# Patient Record
Sex: FEMALE | Race: WHITE | Employment: OTHER | URBAN - METROPOLITAN AREA
[De-identification: names, ages, dates, MRNs, and addresses within clinical notes are randomized per-mention and may not be internally consistent; named-entity substitution may affect disease eponyms.]

---

## 2017-01-04 DIAGNOSIS — Z79.899 ENCOUNTER FOR LONG-TERM CURRENT USE OF MEDICATION: ICD-10-CM

## 2017-01-04 DIAGNOSIS — Z94.0 KIDNEY REPLACED BY TRANSPLANT: ICD-10-CM

## 2017-01-04 DIAGNOSIS — Z48.298 AFTERCARE FOLLOWING ORGAN TRANSPLANT: ICD-10-CM

## 2017-01-04 PROCEDURE — 80158 DRUG ASSAY CYCLOSPORINE: CPT | Performed by: INTERNAL MEDICINE

## 2017-01-04 NOTE — Clinical Note
OUTPATIENT LABORATORY TEST ORDER      Patient Name: Meliza Krishnamurthy                   Transplant Date: January 6, 1998  YOB: 1935                       Issue Date & Time: 1/4/2017  7:23 AM  [facility] MR: [id number]                             Exp. Date (1 year after date issued)      Diagnoses: Aftercare following organ transplant (ICD-10 Z48.288)   Kidney Transplant (ICD-10 Z94.0)   Long term use of medications (ICD-10 Z79.899)      Lab results to be available on the same day drawn.   Patient should release information to the [facility], Transplant Center.  Please fax to the Transplant Center at [phone].      Every 3 Month(s)  ?CBC and Platelet  ?Basic Metabolic Panel (Sodium, Potassium, Chloride, CO2, Creatinine, Urea Nitrogen, Glucose, Calcium)  ?Cyclosporine/Neoral/Gengraf drug level              Every 6 Months       Due:  and                                      ?Urine for protein/creatinine       If you have any questions, please call The Transplant Center at [phone] or [phone].    Please fax labs to [phone]  .

## 2017-01-06 LAB
CYCLOSPORINE BLD LC/MS/MS-MCNC: 45 UG/L (ref 50–400)
TME LAST DOSE: ABNORMAL H

## 2017-01-08 ENCOUNTER — TELEPHONE (OUTPATIENT)
Dept: TRANSPLANT | Facility: CLINIC | Age: 82
End: 2017-01-08

## 2017-03-23 ENCOUNTER — TELEPHONE (OUTPATIENT)
Dept: TRANSPLANT | Facility: CLINIC | Age: 82
End: 2017-03-23

## 2017-03-23 NOTE — TELEPHONE ENCOUNTER
Prior Authorization Specialty Medication Request    Medication/Dose: Mycophenolate Mofetil 250 mg, Take 2 capsules by mouth (500 mg) every morning and 1 capsules (250 mg) every evening  Diagnosis and ICD: Kidney Transplant Z94.0  New/Renewal/Insurance Change PA: renewal    Important Lab Values:     Previously Tried and Failed Therapies:     Rationale:     Would you like to include any research articles?    If yes please include the hyperlink(s) below or fax @ [phone].    (Include Name and MRN)    If you received a fax notification from an outside Pharmacy;  Pharmacy Name:[facility]  Pharmacy #:[phone]  Pharmacy Fax:[phone]

## 2017-03-28 NOTE — TELEPHONE ENCOUNTER
[facility] called, having questions about med to determine Medicare B or D coverage. Please advise at [phone].

## 2017-04-03 ENCOUNTER — TELEPHONE (OUTPATIENT)
Dept: TRANSPLANT | Facility: CLINIC | Age: 82
End: 2017-04-03

## 2017-04-03 NOTE — TELEPHONE ENCOUNTER
Call placed to PA dept: Spoke with Anthony she states that an encounter for patient MMF has been started and she will open an encounter for patient Cyclosporine.

## 2017-04-03 NOTE — TELEPHONE ENCOUNTER
[4/3/2017 4:07 PM] Alexandra Barba:   I have Rubi from [facility] regarding a PA that was fax to us and they called on 3/28/2017. Patient is completely out of her meds Meliza Krishnamurthy [id number]. Can you speak with her      Per Rubi from [facility] they have faxed the PA paperwork twice to the Doctor's office.  Made aware they can fax it to the transplant center fax number, fax number provided.  Patient has ran out of medication. Message left for Noemi CABRAL LPN task:  Please call PRIOR AUTH Dept ([phone]) re: Prior auth for both Cyclosporine and mycophenolate.  Insurance Contact number is [phone].

## 2017-04-04 NOTE — TELEPHONE ENCOUNTER
PA Initiation    Medication: mycophenolate  Insurance Company: Medicare [employer] - Phone [phone] Fax [phone]  Pharmacy Filling the Rx:    Filling Pharmacy Phone:    Filling Pharmacy Fax:    Start Date: 4/4/2017    [facility] Authorization Team   Phone: [phone]  Fax: [phone]

## 2017-04-05 NOTE — TELEPHONE ENCOUNTER
Prior Authorization Specialty Medication Request    Medication/Dose: mycophenolate  Diagnosis and ICD: Kidney Transplant Z94.0  New/Renewal/Insurance Change PA:  Cover my med key [id number]    Important Lab Values:     Previously Tried and Failed Therapies:     Rationale:     Would you like to include any research articles?    If yes please include the hyperlink(s) below or fax @ [phone].    (Include Name and MRN)    If you received a fax notification from an outside Pharmacy;  Pharmacy Name: [facility]  Pharmacy #: [phone]  Pharmacy Fax: [phone]

## 2017-04-10 DIAGNOSIS — Z94.0 KIDNEY REPLACED BY TRANSPLANT: ICD-10-CM

## 2017-04-10 DIAGNOSIS — Z48.298 AFTERCARE FOLLOWING ORGAN TRANSPLANT: ICD-10-CM

## 2017-04-10 DIAGNOSIS — Z79.899 ENCOUNTER FOR LONG-TERM CURRENT USE OF MEDICATION: ICD-10-CM

## 2017-04-10 PROCEDURE — 80158 DRUG ASSAY CYCLOSPORINE: CPT | Performed by: INTERNAL MEDICINE

## 2017-04-11 LAB
CYCLOSPORINE BLD LC/MS/MS-MCNC: 84 UG/L (ref 50–400)
TME LAST DOSE: NORMAL H

## 2017-04-11 NOTE — TELEPHONE ENCOUNTER
Prior Authorization Approval    Authorization Effective Date: 4/7/2017  Authorization Expiration Date: 4/11/2018  Medication: mycophenolate  Approved Dose/Quantity: 90  Reference #: [id number]   Insurance Company: Medicare [employer] RX - Phone [phone] Fax [phone]  Expected CoPay:       CoPay Card Available:      Foundation Assistance Needed:    Which Pharmacy is filling the prescription (Not needed for infusion/clinic administered):    Pharmacy Notified:    Patient Notified:      MEGAN Colmenares Prior Authorization Team   Phone: [phone]  Fax: [phone]

## 2017-04-11 NOTE — TELEPHONE ENCOUNTER
Prior Authorization Approval    Authorization Effective Date: 4/7/2017  Authorization Expiration Date: 4/11/2018  Medication: mycophenolate  Approved Dose/Quantity: 90  Reference #: [id number]   Insurance Company: [employer] [place] - Phone [phone] Fax [phone]  Expected CoPay:       CoPay Card Available:      Foundation Assistance Needed:    Which Pharmacy is filling the prescription (Not needed for infusion/clinic administered):    Pharmacy Notified:    Patient Notified:

## 2017-05-02 DIAGNOSIS — Z94.0 KIDNEY TRANSPLANT STATUS: Primary | ICD-10-CM

## 2017-05-02 NOTE — LETTER
OUTPATIENT LABORATORY TEST ORDER      Patient Name: Meliza Krishnamurthy                   Transplant Date: January 6, 1998  YOB: 1935                       Issue Date & Time: 1/4/2017  7:23 AM  [facility] MR: [id number]                             Exp. Date (1 year after date issued)      Diagnoses: Aftercare following organ transplant (ICD-10 Z48.288)   Kidney Transplant (ICD-10 Z94.0)   Long term use of medications (ICD-10 Z79.899)      Lab results to be available on the same day drawn.   Patient should release information to the [facility], Transplant Center.  Please fax to the Transplant Center at [phone].      Every 3 Month(s)  ?CBC and Platelet  ?Basic Metabolic Panel (Sodium, Potassium, Chloride, CO2, Creatinine, Urea Nitrogen, Glucose, Calcium)  ?Cyclosporine/Neoral/Gengraf drug level              Every 6 Months       Due:  and                                      ?Urine for protein/creatinine       If you have any questions, please call The Transplant Center at [phone] or [phone].    Please fax labs to [phone]  .

## 2017-05-18 DIAGNOSIS — Z94.0 DECEASED-DONOR KIDNEY TRANSPLANT RECIPIENT: Primary | ICD-10-CM

## 2017-05-18 RX ORDER — MYCOPHENOLATE MOFETIL 250 MG/1
CAPSULE ORAL
Qty: 90 CAPSULE | Refills: 11 | Status: SHIPPED | OUTPATIENT
Start: 2017-05-18 | End: 2018-04-30

## 2017-07-12 DIAGNOSIS — Z48.298 AFTERCARE FOLLOWING ORGAN TRANSPLANT: ICD-10-CM

## 2017-07-12 DIAGNOSIS — Z79.899 ENCOUNTER FOR LONG-TERM CURRENT USE OF MEDICATION: ICD-10-CM

## 2017-07-12 DIAGNOSIS — Z94.0 KIDNEY REPLACED BY TRANSPLANT: ICD-10-CM

## 2017-07-12 PROCEDURE — 80158 DRUG ASSAY CYCLOSPORINE: CPT | Performed by: INTERNAL MEDICINE

## 2017-07-13 LAB
CYCLOSPORINE BLD LC/MS/MS-MCNC: 72 UG/L (ref 50–400)
TME LAST DOSE: NORMAL H

## 2017-10-25 PROCEDURE — 80158 DRUG ASSAY CYCLOSPORINE: CPT | Performed by: INTERNAL MEDICINE

## 2017-10-26 LAB
CYCLOSPORINE BLD LC/MS/MS-MCNC: 68 UG/L (ref 50–400)
TME LAST DOSE: NORMAL H

## 2017-10-27 ENCOUNTER — OFFICE VISIT (OUTPATIENT)
Dept: NEPHROLOGY | Facility: CLINIC | Age: 82
End: 2017-10-27
Attending: INTERNAL MEDICINE
Payer: MEDICARE

## 2017-10-27 VITALS
DIASTOLIC BLOOD PRESSURE: 76 MMHG | HEIGHT: 64 IN | OXYGEN SATURATION: 99 % | SYSTOLIC BLOOD PRESSURE: 149 MMHG | HEART RATE: 61 BPM | WEIGHT: 110.2 LBS | BODY MASS INDEX: 18.82 KG/M2

## 2017-10-27 DIAGNOSIS — Z94.0 DECEASED-DONOR KIDNEY TRANSPLANT: Primary | ICD-10-CM

## 2017-10-27 DIAGNOSIS — Q61.3 POLYCYSTIC KIDNEY DISEASE: ICD-10-CM

## 2017-10-27 DIAGNOSIS — I12.9 RENAL HYPERTENSION: Primary | ICD-10-CM

## 2017-10-27 DIAGNOSIS — I12.9 RENAL HYPERTENSION: ICD-10-CM

## 2017-10-27 DIAGNOSIS — Z94.0 DECEASED-DONOR KIDNEY TRANSPLANT: ICD-10-CM

## 2017-10-27 PROCEDURE — 99213 OFFICE O/P EST LOW 20 MIN: CPT | Mod: ZF

## 2017-10-27 ASSESSMENT — PAIN SCALES - GENERAL: PAINLEVEL: NO PAIN (0)

## 2017-10-27 NOTE — MR AVS SNAPSHOT
After Visit Summary   10/27/2017    Meliza Krishnamurthy    MRN: [id number]           Patient Information     Date Of Birth          1935        Visit Information        Provider Department      10/27/2017 4:00 PM Noemi Renee MD [facility] Neph[facility]        Today's Diagnoses     -donor kidney transplant    -  1    Polycystic kidney disease        Renal hypertension          Care Instructions    1.  Check BP at home and call if over 150/80  2.  Keep eating!          Follow-ups after your visit        Follow-up notes from your care team     Return in about 11 months (around 2018).      Your next 10 appointments already scheduled     Sep 25, 2018  2:45 PM CDT   (Arrive by 2:15 PM)   Return Kidney Transplant with Noemi Renee MD   [facility]ph[facility] ([facility])    [address] [place] [id number] [phone]              Who to contact     If you have questions or need follow up information about today's clinic visit or your schedule please contact [facility] NE[facility]ROLOG[facility] directly at [phone].  Normal or non-critical lab and imaging results will be communicated to you by MyChart, letter or phone within 4 business days after the clinic has received the results. If you do not hear from us within 7 days, please contact the clinic through [employer] or phone. If you have a critical or abnormal lab result, we will notify you by phone as soon as possible.  Submit refill requests through [employer] or call your pharmacy and they will forward the refill request to us. Please allow 3 business days for your refill to be completed.          Additional Information About Your Visit        MyChart Information     [employer] gives you secure access to your electronic health record. If you see a primary care provider, you can also send messages to your care team and make appointments. If you have questions, please call your primary care clinic.  If you  "do not have a primary care provider, please call [phone] and they will assist you.        Care EveryWhere ID     This is your Care EveryWhere ID. This could be used by other organizations to access your [facility] medical records  [id number]        Your Vitals Were     Pulse Height Pulse Oximetry BMI (Body Mass Index)          61 1.626 m (5' 4\") 99% 18.92 kg/m2         Blood Pressure from Last 3 Encounters:   10/27/17 149/76   10/28/16 152/79   07/06/15 126/71    Weight from Last 3 Encounters:   10/27/17 50 kg (110 lb 3.2 oz)   10/28/16 50.8 kg (112 lb)   07/06/15 51.2 kg (112 lb 12.8 oz)              Today, you had the following     No orders found for display       Primary Care Provider Office Phone # Fax #    Handy Carrillo [phone]       [address]  [place] [address]        Equal Access to Services     EWELINA MORRELL : Hadii aad ku hadasho Soomaali, waaxda luqadaha, qaybta kaalmada adeegyada, waxay christopherin hayjose jn kaushik worthy . So [facility] [phone].    ATENCIÓN: Si habla español, tiene a marina disposición servicios gratuitos de asistencia lingüística. LlThe [facility] [phone].    We comply with applicable federal civil rights laws and [place] laws. We do not discriminate on the basis of race, color, national origin, age, disability, sex, sexual orientation, or gender identity.            Thank you!     Thank you for choosing [facility] NEPHROLOGY  for your care. Our goal is always to provide you with excellent care. Hearing back from our patients is one way we can continue to improve our services. Please take a few minutes to complete the written survey that you may receive in the mail after your visit with us. Thank you!             Your Updated Medication List - Protect others around you: Learn how to safely use, store and throw away your medicines at www.disposemymeds.org.          This list is accurate as of: 10/27/17 11:59 PM.  Always use your most " recent med list.                   Brand Name Dispense Instructions for use Diagnosis    ASPIRIN      81 mg per day        cycloSPORINE modified 25 MG capsule    GENERIC EQUIVALENT    150 capsule    TAKE 3 CAPSULES BY MOUTH EVERY MORNING AND 2 CAPSULES EVERY EVENING    Kidney transplanted       LASIX PO      Take  by mouth. 20mg 1 tab per day        METOPROLOL TARTRATE      25mg. Twice daily.        MULTIVITAL PO      Take  by mouth.        mycophenolate 250 MG capsule    GENERIC EQUIVALENT    90 capsule    Take 2 capsules by mouth (500 mg) every morning and 1 capsules (250 mg) every evening    -donor kidney transplant recipient       PRAVASTATIN SODIUM PO      Take 10 mg by mouth daily        predniSONE 5 MG tablet    DELTASONE    30 tablet    Take 1 tablet by mouth daily.    Kidney replaced by transplant       UNABLE TO FIND      Sulfa: 400-800mg 1 tab per day        ZETIA PO      Take  by mouth. 10mg once per day

## 2017-10-27 NOTE — NURSING NOTE
"Chief Complaint   Patient presents with     RECHECK     Kidney transplant follow up       Initial /76  Pulse 61  Ht 1.626 m (5' 4\")  Wt 50 kg (110 lb 3.2 oz)  SpO2 99%  BMI 18.92 kg/m2 Estimated body mass index is 18.92 kg/(m^2) as calculated from the following:    Height as of this encounter: 1.626 m (5' 4\").    Weight as of this encounter: 50 kg (110 lb 3.2 oz).  Medication Reconciliation: complete   Pt states all medication are the same  TAYLOR SOLOMON CMA      "

## 2017-10-27 NOTE — LETTER
10/27/2017      RE: Meliza Krishnamurthy  [address] [place] [address]       REASON FOR VISIT:  Ms. Krishnamurthy is an 81-year-old woman here for followup after a  donor kidney transplant in 1998.      The patient had 1 rejection 9 months post-transplant.  Current immunosuppression is Gengraf 75/50, recent level 68, CellCept 500/250 and prednisone 5.  We considered reducing her immunosuppression but she had a DSA in  (DR7) titer 460 and titer measured in 10/2016 was 7686.      She has episodic skin cancer, is seen every 3-4 months and has things removed.  She had Mohs treatment 2 years ago for a facial lesion.      She has had low weight at 110 pounds (BMI 19), which she attributes to early satiety that comes from enlarged polycystic kidneys.  She does not have GI complaints.      She takes her blood pressure at home.  It is generally 120/80, never higher than 130.      REVIEW OF SYSTEMS:  Comprehensive review of systems completed and negative except as in the HPI.      SOCIAL HISTORY:  Her granddaughter is living with their again.  She spends 3 months of the year in [place], [place].  Drinks 5 glasses of alcohol a week and uses a cane after her last hip replacement which has left her with difficulty walking.  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      PAST MEDICAL HISTORY:   1.  Polycystic kidney disease.   2.  Skin cancer, ongoing.   3.  Status post hip replacement x2.   4.  History of cerebral aneurysm repair.   5.   donor kidney transplant in .      PHYSICAL EXAMINATION:   VITAL SIGNS:  Blood pressure 149//74, weight 110, BMI 19, heart rate 61.   GENERAL:  She is well-developed, thin, petite.   SKIN:  Tanned.  She has some changes of chronic immunosuppression, particularly on her hands and shoulders and around the neck.   HEENT:  Bilateral carotid bruits.   LUNGS:  Clear.   CARDIAC:  Regular sinus rhythm, no  murmurs, rubs or gallops.   ABDOMEN:  Not examined.   NEUROLOGIC:  She has difficulty walking and uses a scissors gait and still uses a cane.      LABORATORY DATA:  Creatinine 0.8, do not have the rest labs in our chart yet from 10/25.  CSA level 68.      IMPRESSION:   1.  Allograft function.  Good.   2.  Immunosuppression.  Decided not to drop the CellCept because the DSA was rising in 10/2016.  Will repeat that test this year.   3.  Hypertension, well controlled at home and never quite as good in clinic.  We will use a home blood pressure measurements.   4.  Skin cancer, stable, some ongoing problems.      PLAN:   1.  No change in medications.   2.  The patient encouraged to eat regularly.         MORE VOSS MD             D: 10/27/2017 16:19   T: 10/28/2017 12:27   MT: KEEGAN      Name:     RAUDEL SUAZO   MRN:      [id number]        Account:      [id number]   :      1935           Service Date: 10/27/2017      Document: [id number]

## 2017-10-28 NOTE — PROGRESS NOTES
REASON FOR VISIT:  Ms. Krishnamurthy is an 81-year-old woman here for followup after a  donor kidney transplant in 1998.      The patient had 1 rejection 9 months post-transplant.  Current immunosuppression is Gengraf 75/50, recent level 68, CellCept 500/250 and prednisone 5.  We considered reducing her immunosuppression but she had a DSA in  (DR7) titer 460 and titer measured in 10/2016 was 7686.      She has episodic skin cancer, is seen every 3-4 months and has things removed.  She had Mohs treatment 2 years ago for a facial lesion.      She has had low weight at 110 pounds (BMI 19), which she attributes to early satiety that comes from enlarged polycystic kidneys.  She does not have GI complaints.      She takes her blood pressure at home.  It is generally 120/80, never higher than 130.      REVIEW OF SYSTEMS:  Comprehensive review of systems completed and negative except as in the HPI.      SOCIAL HISTORY:  Her granddaughter is living with their again.  She spends 3 months of the year in [place], [place].  Drinks 5 glasses of alcohol a week and uses a cane after her last hip replacement which has left her with difficulty walking.  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      PAST MEDICAL HISTORY:   1.  Polycystic kidney disease.   2.  Skin cancer, ongoing.   3.  Status post hip replacement x2.   4.  History of cerebral aneurysm repair.   5.   donor kidney transplant in .      PHYSICAL EXAMINATION:   VITAL SIGNS:  Blood pressure 149//74, weight 110, BMI 19, heart rate 61.   GENERAL:  She is well-developed, thin, petite.   SKIN:  Tanned.  She has some changes of chronic immunosuppression, particularly on her hands and shoulders and around the neck.   HEENT:  Bilateral carotid bruits.   LUNGS:  Clear.   CARDIAC:  Regular sinus rhythm, no murmurs, rubs or gallops.   ABDOMEN:  Not examined.   NEUROLOGIC:  She has  difficulty walking and uses a scissors gait and still uses a cane.      LABORATORY DATA:  Creatinine 0.8, do not have the rest labs in our chart yet from 10/25.  CSA level 68.   Electrolytes/Renal -      IMPRESSION:   1.  Allograft function.  Good.   2.  Immunosuppression.  Decided not to drop the CellCept because the DSA was rising in 10/2016.  Will repeat that test this year.   3.  Hypertension, well controlled at home and never quite as good in clinic.  We will use a home blood pressure measurements.   4.  Skin cancer, stable, some ongoing problems.      PLAN:   1.  No change in medications.   2.  The patient encouraged to eat regularly.     Current Outpatient Prescriptions   Medication Sig Dispense Refill     mycophenolate (CELLCEPT - GENERIC EQUIVALENT) 250 MG capsule Take 2 capsules by mouth (500 mg) every morning and 1 capsules (250 mg) every evening 90 capsule 11     PRAVASTATIN SODIUM PO Take 10 mg by mouth daily       cycloSPORINE modified (GENERIC EQUIVALENT) 25 MG capsule TAKE 3 CAPSULES BY MOUTH EVERY MORNING AND 2 CAPSULES EVERY EVENING 150 capsule 11     predniSONE (DELTASONE) 5 MG tablet Take 1 tablet by mouth daily. 30 tablet 11     Furosemide (LASIX PO) Take  by mouth. 20mg 1 tab per day       UNABLE TO FIND Sulfa: 400-800mg 1 tab per day       METOPROLOL TARTRATE 25mg. Twice daily.        Ezetimibe (ZETIA PO) Take  by mouth. 10mg once per day       Multiple Vitamins-Minerals (MULTIVITAL PO) Take  by mouth.       ASPIRIN 81 mg per day             MORE VOSS MD             D: 10/27/2017 16:19   T: 10/28/2017 12:27   MT: KEEGAN      Name:     RAUDEL SUAZO   MRN:      [id number]        Account:      [id number]   :      1935           Service Date: 10/27/2017      Document: [id number]

## 2017-11-06 DIAGNOSIS — Z79.899 ENCOUNTER FOR LONG-TERM CURRENT USE OF MEDICATION: ICD-10-CM

## 2017-11-06 DIAGNOSIS — Z94.0 KIDNEY REPLACED BY TRANSPLANT: Primary | ICD-10-CM

## 2017-11-06 DIAGNOSIS — Z48.298 AFTERCARE FOLLOWING ORGAN TRANSPLANT: ICD-10-CM

## 2018-01-09 ENCOUNTER — TRANSFERRED RECORDS (OUTPATIENT)
Dept: HEALTH INFORMATION MANAGEMENT | Facility: CLINIC | Age: 83
End: 2018-01-09

## 2018-01-18 ENCOUNTER — TELEPHONE (OUTPATIENT)
Dept: TRANSPLANT | Facility: CLINIC | Age: 83
End: 2018-01-18

## 2018-01-18 NOTE — LETTER
OUTPATIENT LABORATORY TEST ORDER    Patient Name: Meliza Krishnamurthy                   Transplant Date: 01-  YOB: 1935                       Issue Date & Time: January 18, 20182:59 PM  [facility] MR: [id number]                      Exp. Date (1 year after date issued)      Diagnoses: Kidney Transplant (ICD-10 Z94.0)   Long term use of medications (ICD-10  Z79.899)     Lab results to be available on the same day drawn.   Patient should release information to the [facility] Trans[facility].  Please fax to the Transplant Center at [phone].      Every 3 Month(s)  ?CBC and Platelet  ?Basic Metabolic Panel (Sodium, Potassium, Chloride, CO2, Creatinine, Urea Nitrogen, Glucose, Calcium)           ?CSA (Cyclosporine) drug level.              Every 6 Months       Due:  and                                                      ?Urine for protein/creatinine       If you have any questions, please call The Transplant Center at [phone] or [phone].    Please fax labs to [phone]  .

## 2018-01-18 NOTE — TELEPHONE ENCOUNTER
Patient Call: Transplant Lab  Reason for call: Annual lab reorder  Patient is in [place] is needing a new lab order and was wondering if the order could be sent to her?

## 2018-01-18 NOTE — TELEPHONE ENCOUNTER
Spoke to patient and she gave temporary address for [place], will be there until April.  [address]  [place]  [address]  Mailed updated standing lab order.

## 2018-01-29 ENCOUNTER — TELEPHONE (OUTPATIENT)
Dept: TRANSPLANT | Facility: CLINIC | Age: 83
End: 2018-01-29

## 2018-01-29 DIAGNOSIS — Z79.899 ENCOUNTER FOR LONG-TERM CURRENT USE OF MEDICATION: ICD-10-CM

## 2018-01-29 DIAGNOSIS — Z94.0 KIDNEY REPLACED BY TRANSPLANT: ICD-10-CM

## 2018-01-29 DIAGNOSIS — Z48.298 AFTERCARE FOLLOWING ORGAN TRANSPLANT: ICD-10-CM

## 2018-01-29 PROCEDURE — 80158 DRUG ASSAY CYCLOSPORINE: CPT | Performed by: INTERNAL MEDICINE

## 2018-01-29 NOTE — TELEPHONE ENCOUNTER
Patient Call: Transplant Lab  Reason for call: patient would like more labs drawn then what her lab jana. Please call patient to see what lab order she would like.  Patient is in [place].

## 2018-02-01 LAB
CYCLOSPORINE BLD LC/MS/MS-MCNC: 73 UG/L (ref 50–400)
TME LAST DOSE: NORMAL H

## 2018-04-24 ENCOUNTER — TELEPHONE (OUTPATIENT)
Dept: NEPHROLOGY | Facility: CLINIC | Age: 83
End: 2018-04-24

## 2018-04-24 NOTE — TELEPHONE ENCOUNTER
[facility] Call Center    Phone Message    May a detailed message be left on voicemail: no    Reason for Call: Other: Dr. Riccardo Mabry wants to consult Dr. Renee on medication prescribed to their mutual pt.     Action Taken: Other: Routed:  P Adult Nephrology [facility]

## 2018-04-25 DIAGNOSIS — Z48.298 AFTERCARE FOLLOWING ORGAN TRANSPLANT: ICD-10-CM

## 2018-04-25 DIAGNOSIS — Z94.0 KIDNEY REPLACED BY TRANSPLANT: ICD-10-CM

## 2018-04-25 DIAGNOSIS — Z79.899 ENCOUNTER FOR LONG-TERM CURRENT USE OF MEDICATION: ICD-10-CM

## 2018-04-25 PROCEDURE — 80158 DRUG ASSAY CYCLOSPORINE: CPT | Performed by: INTERNAL MEDICINE

## 2018-04-27 LAB
CYCLOSPORINE BLD LC/MS/MS-MCNC: 68 UG/L (ref 50–400)
TME LAST DOSE: NORMAL H

## 2018-04-30 ENCOUNTER — TELEPHONE (OUTPATIENT)
Dept: TRANSPLANT | Facility: CLINIC | Age: 83
End: 2018-04-30

## 2018-04-30 DIAGNOSIS — Z94.0 DECEASED-DONOR KIDNEY TRANSPLANT RECIPIENT: ICD-10-CM

## 2018-04-30 RX ORDER — MYCOPHENOLATE MOFETIL 250 MG/1
CAPSULE ORAL
Qty: 90 CAPSULE | Refills: 11 | Status: SHIPPED | OUTPATIENT
Start: 2018-04-30 | End: 2020-09-15

## 2018-04-30 NOTE — TELEPHONE ENCOUNTER
Spoke with Dr. Schumacher regarding plan to take the CSA later that day if patient will be on NPO.  Rechecking labs a week after the procedure.

## 2018-04-30 NOTE — TELEPHONE ENCOUNTER
Provider will be availble all day- he received a paper message to call the office w/ questions regarding the use of the pt's Cyclosporine w/ a new up-coming procedure. Please call when available.

## 2018-07-09 DIAGNOSIS — Z48.298 AFTERCARE FOLLOWING ORGAN TRANSPLANT: ICD-10-CM

## 2018-07-09 DIAGNOSIS — Z94.0 KIDNEY REPLACED BY TRANSPLANT: ICD-10-CM

## 2018-07-09 DIAGNOSIS — Z79.899 ENCOUNTER FOR LONG-TERM CURRENT USE OF MEDICATION: ICD-10-CM

## 2018-07-09 PROCEDURE — 80158 DRUG ASSAY CYCLOSPORINE: CPT | Performed by: INTERNAL MEDICINE

## 2018-07-10 LAB
CYCLOSPORINE BLD LC/MS/MS-MCNC: 79 UG/L (ref 50–400)
TME LAST DOSE: NORMAL H

## 2018-10-08 DIAGNOSIS — Z79.899 ENCOUNTER FOR LONG-TERM CURRENT USE OF MEDICATION: ICD-10-CM

## 2018-10-08 DIAGNOSIS — Z48.298 AFTERCARE FOLLOWING ORGAN TRANSPLANT: ICD-10-CM

## 2018-10-08 DIAGNOSIS — Z94.0 KIDNEY REPLACED BY TRANSPLANT: ICD-10-CM

## 2018-10-08 PROCEDURE — 80158 DRUG ASSAY CYCLOSPORINE: CPT | Performed by: INTERNAL MEDICINE

## 2018-10-09 LAB
CYCLOSPORINE BLD LC/MS/MS-MCNC: 73 UG/L (ref 50–400)
TME LAST DOSE: NORMAL H

## 2018-10-30 ENCOUNTER — RESULTS ONLY (OUTPATIENT)
Dept: OTHER | Facility: CLINIC | Age: 83
End: 2018-10-30

## 2018-10-30 ENCOUNTER — OFFICE VISIT (OUTPATIENT)
Dept: NEPHROLOGY | Facility: CLINIC | Age: 83
End: 2018-10-30
Attending: INTERNAL MEDICINE
Payer: MEDICARE

## 2018-10-30 ENCOUNTER — APPOINTMENT (OUTPATIENT)
Dept: LAB | Facility: CLINIC | Age: 83
End: 2018-10-30
Payer: COMMERCIAL

## 2018-10-30 VITALS
TEMPERATURE: 98.7 F | HEART RATE: 54 BPM | SYSTOLIC BLOOD PRESSURE: 105 MMHG | DIASTOLIC BLOOD PRESSURE: 64 MMHG | RESPIRATION RATE: 16 BRPM | OXYGEN SATURATION: 97 % | BODY MASS INDEX: 18.78 KG/M2 | HEIGHT: 64 IN | WEIGHT: 110 LBS

## 2018-10-30 DIAGNOSIS — D64.9 ANEMIA, UNSPECIFIED TYPE: ICD-10-CM

## 2018-10-30 DIAGNOSIS — Z94.0 DECEASED-DONOR KIDNEY TRANSPLANT: ICD-10-CM

## 2018-10-30 DIAGNOSIS — Z23 NEED FOR PROPHYLACTIC VACCINATION AND INOCULATION AGAINST INFLUENZA: Primary | ICD-10-CM

## 2018-10-30 LAB
IRON SATN MFR SERPL: 10 % (ref 15–46)
IRON SERPL-MCNC: 41 UG/DL (ref 35–180)
TIBC SERPL-MCNC: 435 UG/DL (ref 240–430)

## 2018-10-30 PROCEDURE — 90662 IIV NO PRSV INCREASED AG IM: CPT | Mod: ZF | Performed by: INTERNAL MEDICINE

## 2018-10-30 PROCEDURE — 25000128 H RX IP 250 OP 636: Mod: ZF | Performed by: INTERNAL MEDICINE

## 2018-10-30 PROCEDURE — G0008 ADMIN INFLUENZA VIRUS VAC: HCPCS | Mod: ZF

## 2018-10-30 PROCEDURE — 36415 COLL VENOUS BLD VENIPUNCTURE: CPT | Performed by: INTERNAL MEDICINE

## 2018-10-30 PROCEDURE — 83540 ASSAY OF IRON: CPT | Performed by: INTERNAL MEDICINE

## 2018-10-30 PROCEDURE — 86833 HLA CLASS II HIGH DEFIN QUAL: CPT | Performed by: INTERNAL MEDICINE

## 2018-10-30 PROCEDURE — G0463 HOSPITAL OUTPT CLINIC VISIT: HCPCS | Mod: 25,ZF

## 2018-10-30 PROCEDURE — 83550 IRON BINDING TEST: CPT | Performed by: INTERNAL MEDICINE

## 2018-10-30 PROCEDURE — 86832 HLA CLASS I HIGH DEFIN QUAL: CPT | Performed by: INTERNAL MEDICINE

## 2018-10-30 RX ORDER — SULFAMETHOXAZOLE AND TRIMETHOPRIM 400; 80 MG/1; MG/1
1 TABLET ORAL DAILY
COMMUNITY
End: 2021-01-01

## 2018-10-30 RX ADMIN — INFLUENZA A VIRUS A/MICHIGAN/45/2015 X-275 (H1N1) ANTIGEN (FORMALDEHYDE INACTIVATED), INFLUENZA A VIRUS A/SINGAPORE/INFIMH-16-0019/2016 IVR-186 (H3N2) ANTIGEN (FORMALDEHYDE INACTIVATED), AND INFLUENZA B VIRUS B/MARYLAND/15/2016 BX-69A (A B/COLORADO/6/2017-LIKE VIRUS) ANTIGEN (FORMALDEHYDE INACTIVATED) 0.5 ML: 60; 60; 60 INJECTION, SUSPENSION INTRAMUSCULAR at 15:39

## 2018-10-30 ASSESSMENT — PAIN SCALES - GENERAL: PAINLEVEL: NO PAIN (0)

## 2018-10-30 NOTE — MR AVS SNAPSHOT
After Visit Summary   10/30/2018    Meliza Krishnamurthy    MRN: [id number]           Patient Information     Date Of Birth          1935        Visit Information        Provider Department      10/30/2018 1:55 PM Noemi Renee MD [facility] Nephrology        Today's Diagnoses     Need for prophylactic vaccination and inoculation against influenza    -  1    -donor kidney transplant        Anemia, unspecified type          Care Instructions    1.  Your hemoglobin is low.  Will check iron test.  Ask your primary care doc about it  2.  Will check antibody level today  3.  Will let you know if we can decrease any meds  4.  Stop lasix.  Call Deja if you get swelling or high  [phone]          Follow-ups after your visit        Follow-up notes from your care team     Return in about 1 year (around 10/30/2019).      Who to contact     If you have questions or need follow up information about today's clinic visit or your schedule please contact [facility] NEPHROLOGY directly at [phone].  Normal or non-critical lab and imaging results will be communicated to you by [employer], letter or phone within 4 business days after the clinic has received the results. If you do not hear from us within 7 days, please contact the clinic through [employer] or phone. If you have a critical or abnormal lab result, we will notify you by phone as soon as possible.  Submit refill requests through [employer] or call your pharmacy and they will forward the refill request to us. Please allow 3 business days for your refill to be completed.          Additional Information About Your Visit        [employer] Information     [employer] gives you secure access to your electronic health record. If you see a primary care provider, you can also send messages to your care team and make appointments. If you have questions, please call your primary care clinic.  If you do not have a primary care provider, please call [phone] and  "they will assist you.        Care EveryWhere ID     This is your Care EveryWhere ID. This could be used by other organizations to access your [place] medical records  [id number]        Your Vitals Were     Pulse Temperature Respirations Height Pulse Oximetry BMI (Body Mass Index)    54 98.7  F (37.1  C) (Oral) 16 1.626 m (5' 4\") 97% 18.88 kg/m2       Blood Pressure from Last 3 Encounters:   10/30/18 105/64   10/27/17 149/76   10/28/16 152/79    Weight from Last 3 Encounters:   10/30/18 49.9 kg (110 lb)   10/27/17 50 kg (110 lb 3.2 oz)   10/28/16 50.8 kg (112 lb)              We Performed the Following     Iron and iron binding capacity     PRA Donor Specific Antibody        Primary Care Provider Office Phone # Fax #    Handy Carrillo [phone]       [address] [place] [address]        Equal Access to Services     EWELINA MORRELL : Hadii aad ku hadasho Soomaali, waaxda luqadaha, qaybta kaalmada adeegyada, waxay idiin hayaan kaushik worthy . So [facility] [phone].    ATENCIÓN: Si habla español, tiene a marina disposición servicios gratuitos de asistencia lingüística. [facility] [phone].    We comply with applicable federal civil rights laws and [place] laws. We do not discriminate on the basis of race, color, national origin, age, disability, sex, sexual orientation, or gender identity.            Thank you!     Thank you for choosing [facility] NEPHROLOGY  for your care. Our goal is always to provide you with excellent care. Hearing back from our patients is one way we can continue to improve our services. Please take a few minutes to complete the written survey that you may receive in the mail after your visit with us. Thank you!             Your Updated Medication List - Protect others around you: Learn how to safely use, store and throw away your medicines at www.disposemymeds.org.          This list is accurate as of 10/30/18 11:59 PM.  Always use your " most recent med list.                   Brand Name Dispense Instructions for use Diagnosis    ASPIRIN      81 mg per day        BACTRIM 400-80 MG per tablet   Generic drug:  sulfamethoxazole-trimethoprim      Take 1 tablet by mouth daily        cycloSPORINE modified 25 MG capsule    GENERIC EQUIVALENT    150 capsule    TAKE 3 CAPSULES BY MOUTH EVERY MORNING AND 2 CAPSULES EVERY EVENING    Kidney transplanted       LASIX PO      Take  by mouth. 20mg 1 tab per day        METOPROLOL TARTRATE      25mg. Twice daily.        MULTIVITAL PO      Take  by mouth.        mycophenolate 250 MG capsule    GENERIC EQUIVALENT    90 capsule    TAKE 2 CAPSULES BY MOUTH EVERY MORNING AND 1 CAPSULE EVERY EVENING    -donor kidney transplant recipient       predniSONE 5 MG tablet    DELTASONE    30 tablet    Take 1 tablet by mouth daily.    Kidney replaced by transplant       VITAMIN D-1000 MAX ST 1000 units Tabs   Generic drug:  cholecalciferol      Take 1,000 Units by mouth At Bedtime        ZETIA PO      Take  by mouth. 10mg once per day

## 2018-10-30 NOTE — LETTER
10/30/2018      RE: Meliza Krishnamurthy  [address] [place] [address]       Service Date: 10/30/2018      REASON FOR VISIT:  Ms. Krishnamurthy is an 83-year-old woman here for followup after a  donor kidney transplant in 1998.  The patient had 1 rejection 9 months post-transplant.  Baseline creatinine is 0.6 - 0.8.  DSA has been elevated DQB7 in 2016 was 7686.  Therefore her immunosuppression was not reduced.  Current immunosuppression is Gengraf 75 and 50 with the most recent level 73, CellCept 500/250 and prednisone 5.      She has episodic skin cancers followed by a dermatologist every 3 months, had a Mohs procedure several weeks ago, but not recently.      She weighs 110 pounds, BMI 19, which is stable for her.  She has no appetite but has to make herself eat.  Her weight has been at this level for the last 5 years (was 112 in 2016).  She has no GI complaints.      She checks her blood pressure at home, it has been low recently.  Her metoprolol dose was adjusted 12.5 b.i.d., but she continues to take Lasix.  Blood pressure today in clinic is 99/60 to 103/64 so I asked her to stop the Lasix.      REVIEW OF SYSTEMS:  Comprehensive review of systems is completed and negative except as listed above.  Of note, she has carotid artery stenosis which is being followed at the [facility] and she is probably going to have a procedure for that in the next year.  She continues to have difficulty with ambulation because of her hip replacement which did not heal well.      SOCIAL HISTORY:  Her granddaughter continues to live with her.  She spends 3 months a year in [place], [place].  She uses a cane to walk.  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      PAST MEDICAL HISTORY:   1.  Polycystic kidney disease.   2.  Skin cancer.   3.  Status post hip replacement x2.   4.  History of cerebral aneurysm repair.   5.   Carotid artery stenosis being followed.   6.   donor kidney transplant in .   7.  Anemia.      CURRENT MEDICATIONS:  See below.      PHYSICAL EXAMINATION:   VITAL SIGNS:  Blood pressure 99/60 to 105/64, weight 110.   GENERAL:  She is petite.  Skin changes secondary to chronic immunosuppression with verrucous keratosis.   LUNGS:  clear   ABDOMEN:  Not examined.   EXTREMITIES:  Lower extremities no peripheral edema.  She does have some pigment changes.   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  She has had this for many years.  No pain.      LABORATORY DATA:  Creatinine 0.86 (slightly above the baseline).  Cyclosporine level 73, BUN to creatinine 0.24 grams per gram (quite small).  BUN 16.  Hemoglobin 10.9, it has been less than 11 for the last 4 months.   Electrolytes/Renal -   Recent Labs   Lab Test  10/28/16   1453  11   1558   NA  138  142   POTASSIUM  4.8  3.9   CHLORIDE  104  103   CO2  28  31   BUN  19  17   CR  0.67  0.88   GLC  98  91   FARHAT  9.5  10.1   PHOS   --   4.1       CBC -   Recent Labs   Lab Test  10/28/16   1453   WBC  5.8   HGB  12.1   PLT  273       LFTs -   Recent Labs   Lab Test  11   1558   ALBUMIN  4.4       Coags - No lab results found.    Iron Panel -   Recent Labs   Lab Test  10/30/18   1508   IRON  41   IRONSAT  10*       Endocrine - No lab results found.     IMPRESSION:   1.  Allograft function.  Probably stable.  Creatinine slightly higher than her usual baseline.   2.  Proteinuria.  The proteinuria is insignificantly elevated and she is quite small, so it is probably not really excessive proteinuria.   3.  Hypotension.  Her blood pressure is on the low side.  We will stop the Lasix and see if it comes back into a more normal range.  This may be affecting her renal function.   4.  Anemia.  Her hemoglobin is less than 11 and has not been assessed with iron studies.  She does have a family history of ovarian cancer, breast cancer and  has not had any colonoscopy in the last 10 years, so would ask her primary care doctor to pursue that if she is iron deficient.  No other counts are down (platelet count is up slightly), so it would unlikely be marrow insufficiency.   5.  Calcium, phosphorus, PTH.  She has had no PTH checked recently.   6.  Immunosuppression.  She is on 3 drugs, even though she is more than 20 years out and has extensive skin cancer.  However, she has a DSA which has been increasing in titer, so will repeat that today before deciding whether we can further decrease her immunosuppression.      PLAN:   1.  Check PRA. If stable with decrease Cellcept dose to 250 bid  2.  Check PTH.   3.  Stop the Lasix.   4.  Check iron stores. Add: iron stores low so we have contacted pt to see PCP re further evaluation        NOEMI VOSS MD      Current Outpatient Prescriptions   Medication Sig Dispense Refill     ASPIRIN 81 mg per day       cycloSPORINE modified (GENERIC EQUIVALENT) 25 MG capsule TAKE 3 CAPSULES BY MOUTH EVERY MORNING AND 2 CAPSULES EVERY EVENING 150 capsule 11     Ezetimibe (ZETIA PO) Take  by mouth. 10mg once per day       Furosemide (LASIX PO) Take  by mouth. 20mg 1 tab per day       METOPROLOL TARTRATE 25mg. Twice daily.        Multiple Vitamins-Minerals (MULTIVITAL PO) Take  by mouth.       mycophenolate (GENERIC EQUIVALENT) 250 MG capsule TAKE 2 CAPSULES BY MOUTH EVERY MORNING AND 1 CAPSULE EVERY EVENING 90 capsule 11     predniSONE (DELTASONE) 5 MG tablet Take 1 tablet by mouth daily. 30 tablet 11     sulfamethoxazole-trimethoprim (BACTRIM) 400-80 MG per tablet Take 1 tablet by mouth daily       cholecalciferol (VITAMIN D-1000 MAX ST) 1000 units TABS Take 1,000 Units by mouth At Bedtime                D: 10/30/2018   T: 10/30/2018   MT: KEEGAN      Name:     RAUDEL SUAZO   MRN:      [id number]        Account:      [id number]   :      1935           Service Date: 10/30/2018      Document: [id number]        Noemi  JOSHUA Renee MD

## 2018-10-30 NOTE — PATIENT INSTRUCTIONS
1.  Your hemoglobin is low.  Will check iron test.  Ask your primary care doc about it  2.  Will check antibody level today  3.  Will let you know if we can decrease any meds  4.  Stop lasix.  Call Deja if you get swelling or high  [phone]

## 2018-10-30 NOTE — PROGRESS NOTES
Service Date: 10/30/2018      REASON FOR VISIT:  Ms. Krishnamurthy is an 83-year-old woman here for followup after a  donor kidney transplant in 1998.  The patient had 1 rejection 9 months post-transplant.  Baseline creatinine is 0.6 - 0.8.  DSA has been elevated DQB7 in 2016 was 7686.  Therefore her immunosuppression was not reduced.  Current immunosuppression is Gengraf 75 and 50 with the most recent level 73, CellCept 500/250 and prednisone 5.      She has episodic skin cancers followed by a dermatologist every 3 months, had a Mohs procedure several weeks ago, but not recently.      She weighs 110 pounds, BMI 19, which is stable for her.  She has no appetite but has to make herself eat.  Her weight has been at this level for the last 5 years (was 112 in 2016).  She has no GI complaints.      She checks her blood pressure at home, it has been low recently.  Her metoprolol dose was adjusted 12.5 b.i.d., but she continues to take Lasix.  Blood pressure today in clinic is 99/60 to 103/64 so I asked her to stop the Lasix.      REVIEW OF SYSTEMS:  Comprehensive review of systems is completed and negative except as listed above.  Of note, she has carotid artery stenosis which is being followed at the [facility] and she is probably going to have a procedure for that in the next year.  She continues to have difficulty with ambulation because of her hip replacement which did not heal well.      SOCIAL HISTORY:  Her granddaughter continues to live with her.  She spends 3 months a year in [place], [place].  She uses a cane to walk.  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      PAST MEDICAL HISTORY:   1.  Polycystic kidney disease.   2.  Skin cancer.   3.  Status post hip replacement x2.   4.  History of cerebral aneurysm repair.   5.  Carotid artery stenosis being followed.   6.   donor kidney transplant in  1998.   7.  Anemia.      CURRENT MEDICATIONS:  See below.      PHYSICAL EXAMINATION:   VITAL SIGNS:  Blood pressure 99/60 to 105/64, weight 110.   GENERAL:  She is petite.  Skin changes secondary to chronic immunosuppression with verrucous keratosis.   LUNGS:  clear   ABDOMEN:  Not examined.   EXTREMITIES:  Lower extremities no peripheral edema.  She does have some pigment changes.   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  She has had this for many years.  No pain.      LABORATORY DATA:  Creatinine 0.86 (slightly above the baseline).  Cyclosporine level 73, BUN to creatinine 0.24 grams per gram (quite small).  BUN 16.  Hemoglobin 10.9, it has been less than 11 for the last 4 months.   Electrolytes/Renal -   Recent Labs   Lab Test  10/28/16   1453  09/20/11   1558   NA  138  142   POTASSIUM  4.8  3.9   CHLORIDE  104  103   CO2  28  31   BUN  19  17   CR  0.67  0.88   GLC  98  91   FARHAT  9.5  10.1   PHOS   --   4.1       CBC -   Recent Labs   Lab Test  10/28/16   1453   WBC  5.8   HGB  12.1   PLT  273       LFTs -   Recent Labs   Lab Test  09/20/11   1558   ALBUMIN  4.4       Coags - No lab results found.    Iron Panel -   Recent Labs   Lab Test  10/30/18   1508   IRON  41   IRONSAT  10*       Endocrine - No lab results found.     IMPRESSION:   1.  Allograft function.  Probably stable.  Creatinine slightly higher than her usual baseline.   2.  Proteinuria.  The proteinuria is insignificantly elevated and she is quite small, so it is probably not really excessive proteinuria.   3.  Hypotension.  Her blood pressure is on the low side.  We will stop the Lasix and see if it comes back into a more normal range.  This may be affecting her renal function.   4.  Anemia.  Her hemoglobin is less than 11 and has not been assessed with iron studies.  She does have a family history of ovarian cancer, breast cancer and has not had any colonoscopy in the last 10 years, so would ask her primary care  doctor to pursue that if she is iron deficient.  No other counts are down (platelet count is up slightly), so it would unlikely be marrow insufficiency.   5.  Calcium, phosphorus, PTH.  She has had no PTH checked recently.   6.  Immunosuppression.  She is on 3 drugs, even though she is more than 20 years out and has extensive skin cancer.  However, she has a DSA which has been increasing in titer, so will repeat that today before deciding whether we can further decrease her immunosuppression.      PLAN:   1.  Check PRA. If stable with decrease Cellcept dose to 250 bid  2.  Check PTH.   3.  Stop the Lasix.   4.  Check iron stores. Add: iron stores low so we have contacted pt to see PCP re further evaluation        MORE VOSS MD      Current Outpatient Prescriptions   Medication Sig Dispense Refill     ASPIRIN 81 mg per day       cycloSPORINE modified (GENERIC EQUIVALENT) 25 MG capsule TAKE 3 CAPSULES BY MOUTH EVERY MORNING AND 2 CAPSULES EVERY EVENING 150 capsule 11     Ezetimibe (ZETIA PO) Take  by mouth. 10mg once per day       Furosemide (LASIX PO) Take  by mouth. 20mg 1 tab per day       METOPROLOL TARTRATE 25mg. Twice daily.        Multiple Vitamins-Minerals (MULTIVITAL PO) Take  by mouth.       mycophenolate (GENERIC EQUIVALENT) 250 MG capsule TAKE 2 CAPSULES BY MOUTH EVERY MORNING AND 1 CAPSULE EVERY EVENING 90 capsule 11     predniSONE (DELTASONE) 5 MG tablet Take 1 tablet by mouth daily. 30 tablet 11     sulfamethoxazole-trimethoprim (BACTRIM) 400-80 MG per tablet Take 1 tablet by mouth daily       cholecalciferol (VITAMIN D-1000 MAX ST) 1000 units TABS Take 1,000 Units by mouth At Bedtime                D: 10/30/2018   T: 10/30/2018   MT: KEEGAN      Name:     RAUDEL SUAZO   MRN:      [id number]        Account:      [id number]   :      1935           Service Date: 10/30/2018      Document: [id number]

## 2018-10-30 NOTE — LETTER
10/30/2018       RE: Meliza Krishnamurthy  [address] [place] [address]     Dear Colleague,    Thank you for referring your patient, Meliza Krishnamurthy, to the [facility] NEPHROLOGY at [facility]. Please see a copy of my visit note below.    Service Date: 10/30/2018      REASON FOR VISIT:  Ms. Krishnamurthy is an 83-year-old woman here for followup after a  donor kidney transplant in 1998.  The patient had 1 rejection 9 months post-transplant.  Baseline creatinine is 0.6 - 0.8.  DSA has been elevated DQB7 in 2016 was 7686.  Therefore her immunosuppression was not reduced.  Current immunosuppression is Gengraf 75 and 50 with the most recent level 73, CellCept 500/250 and prednisone 5.      She has episodic skin cancers followed by a dermatologist every 3 months, had a Mohs procedure several weeks ago, but not recently.      She weighs 110 pounds, BMI 19, which is stable for her.  She has no appetite but has to make herself eat.  Her weight has been at this level for the last 5 years (was 112 in 2016).  She has no GI complaints.      She checks her blood pressure at home, it has been low recently.  Her metoprolol dose was adjusted 12.5 b.i.d., but she continues to take Lasix.  Blood pressure today in clinic is 99/60 to 103/64 so I asked her to stop the Lasix.      REVIEW OF SYSTEMS:  Comprehensive review of systems is completed and negative except as listed above.  Of note, she has carotid artery stenosis which is being followed at the [facility] and she is probably going to have a procedure for that in the next year.  She continues to have difficulty with ambulation because of her hip replacement which did not heal well.      SOCIAL HISTORY:  Her granddaughter continues to live with her.  She spends 3 months a year in [place], [place].  She uses a cane to walk.  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      PAST MEDICAL HISTORY:   1.  Polycystic kidney disease.   2.  Skin cancer.   3.  Status post hip replacement x2.   4.  History of cerebral aneurysm repair.   5.  Carotid artery stenosis being followed.   6.   donor kidney transplant in .   7.  Anemia.      CURRENT MEDICATIONS:  See below.      PHYSICAL EXAMINATION:   VITAL SIGNS:  Blood pressure 99/60 to 105/64, weight 110.   GENERAL:  She is petite.  Skin changes secondary to chronic immunosuppression with verrucous keratosis.   LUNGS:  clear   ABDOMEN:  Not examined.   EXTREMITIES:  Lower extremities no peripheral edema.  She does have some pigment changes.   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  She has had this for many years.  No pain.      LABORATORY DATA:  Creatinine 0.86 (slightly above the baseline).  Cyclosporine level 73, BUN to creatinine 0.24 grams per gram (quite small).  BUN 16.  Hemoglobin 10.9, it has been less than 11 for the last 4 months.   Electrolytes/Renal -   Recent Labs   Lab Test  10/28/16   1453  11   1558   NA  138  142   POTASSIUM  4.8  3.9   CHLORIDE  104  103   CO2  28  31   BUN  19  17   CR  0.67  0.88   GLC  98  91   FARHAT  9.5  10.1   PHOS   --   4.1       CBC -   Recent Labs   Lab Test  10/28/16   1453   WBC  5.8   HGB  12.1   PLT  273       LFTs -   Recent Labs   Lab Test  11   1558   ALBUMIN  4.4       Coags - No lab results found.    Iron Panel -   Recent Labs   Lab Test  10/30/18   1508   IRON  41   IRONSAT  10*       Endocrine - No lab results found.     IMPRESSION:   1.  Allograft function.  Probably stable.  Creatinine slightly higher than her usual baseline.   2.  Proteinuria.  The proteinuria is insignificantly elevated and she is quite small, so it is probably not really excessive proteinuria.   3.  Hypotension.  Her blood pressure is on the low side.  We will stop the Lasix and see if it comes back into a more normal range.  This may  be affecting her renal function.   4.  Anemia.  Her hemoglobin is less than 11 and has not been assessed with iron studies.  She does have a family history of ovarian cancer, breast cancer and has not had any colonoscopy in the last 10 years, so would ask her primary care doctor to pursue that if she is iron deficient.  No other counts are down (platelet count is up slightly), so it would unlikely be marrow insufficiency.   5.  Calcium, phosphorus, PTH.  She has had no PTH checked recently.   6.  Immunosuppression.  She is on 3 drugs, even though she is more than 20 years out and has extensive skin cancer.  However, she has a DSA which has been increasing in titer, so will repeat that today before deciding whether we can further decrease her immunosuppression.      PLAN:   1.  Check PRA. If stable with decrease Cellcept dose to 250 bid  2.  Check PTH.   3.  Stop the Lasix.   4.  Check iron stores. Add: iron stores low so we have contacted pt to see PCP re further evaluation        NOEMI VOSS MD      Current Outpatient Prescriptions   Medication Sig Dispense Refill     ASPIRIN 81 mg per day       cycloSPORINE modified (GENERIC EQUIVALENT) 25 MG capsule TAKE 3 CAPSULES BY MOUTH EVERY MORNING AND 2 CAPSULES EVERY EVENING 150 capsule 11     Ezetimibe (ZETIA PO) Take  by mouth. 10mg once per day       Furosemide (LASIX PO) Take  by mouth. 20mg 1 tab per day       METOPROLOL TARTRATE 25mg. Twice daily.        Multiple Vitamins-Minerals (MULTIVITAL PO) Take  by mouth.       mycophenolate (GENERIC EQUIVALENT) 250 MG capsule TAKE 2 CAPSULES BY MOUTH EVERY MORNING AND 1 CAPSULE EVERY EVENING 90 capsule 11     predniSONE (DELTASONE) 5 MG tablet Take 1 tablet by mouth daily. 30 tablet 11     sulfamethoxazole-trimethoprim (BACTRIM) 400-80 MG per tablet Take 1 tablet by mouth daily       cholecalciferol (VITAMIN D-1000 MAX ST) 1000 units TABS Take 1,000 Units by mouth At Bedtime             Noemi Voss MD       D:  10/30/2018   T: 10/30/2018   MT: KEEGAN      Name:     RAUDEL SUAZO   MRN:      [id number]        Account:      [id number]   :      1935           Service Date: 10/30/2018      Document: [id number]

## 2018-10-30 NOTE — NURSING NOTE
"Injectable Influenza Immunization Documentation    1.  Has the patient received the information for the injectable influenza vaccine? YES     2. Is the patient 6 months of age or older? YES     3. Does the patient have any of the following contraindications?         Severe allergy to eggs? No     Severe allergic reaction to previous influenza vaccines? No   Severe allergy to latex? No       History of Guillain-Flintstone syndrome? No     Currently have a temperature greater than 100.4F? No        4.  Severely egg allergic patients should have flu vaccine eligibility assessed by an MD, RN, or pharmacist, and those who received flu vaccine should be observed for 15 min by an MD, RN, Pharmacist, Medical Technician, or member of clinic staff.\": YES    5. Latex-allergic patients should be given latex-free influenza vaccine Yes. Please reference the Vaccine latex table to determine if your clinic s product is latex-containing.       Vaccination given by Abraham Paulson MA      "

## 2018-10-30 NOTE — NURSING NOTE
"Chief Complaint   Patient presents with     RECHECK     S/P Kidney TX 1/6/1998       /64  Pulse 54  Temp 98.7  F (37.1  C) (Oral)  Resp 16  Ht 1.626 m (5' 4\")  Wt 49.9 kg (110 lb)  SpO2 97%  BMI 18.88 kg/m2    Lisa Willingham CMA  10/30/2018 2:11 PM      "

## 2018-10-31 ENCOUNTER — TELEPHONE (OUTPATIENT)
Dept: NEPHROLOGY | Facility: CLINIC | Age: 83
End: 2018-10-31

## 2018-10-31 NOTE — TELEPHONE ENCOUNTER
Per Dr. Renee:    Please call and tell; her to start taking iron.  She should talk to PCP about further eval such as colonoscopy.     Left detailed voicemail for patient. Advised to call if any questions.    Deja Davis RN

## 2018-11-01 LAB
DONOR IDENTIFICATION: NORMAL
DQB7: 7803
DSA COMMENTS: NORMAL
DSA PRESENT: YES
DSA TEST METHOD: NORMAL
ORGAN: NORMAL
PROTOCOL CUTOFF: NORMAL
SA1 CELL: NORMAL
SA1 COMMENTS: NORMAL
SA1 HI RISK ABY: NORMAL
SA1 MOD RISK ABY: NORMAL
SA1 TEST METHOD: NORMAL
SA2 CELL: NORMAL
SA2 COMMENTS: NORMAL
SA2 HI RISK ABY UA: NORMAL
SA2 MOD RISK ABY: NORMAL
SA2 TEST METHOD: NORMAL
UNACCEPTABLE ANTIGEN: NORMAL
UNOS CPRA: 98

## 2018-11-05 NOTE — TELEPHONE ENCOUNTER
Received call from patient. She requested to have her lab results emailed to her so she can review them with her PCP's office. Results sent.    Deja Davis RN

## 2018-11-27 ENCOUNTER — TELEPHONE (OUTPATIENT)
Dept: TRANSPLANT | Facility: CLINIC | Age: 83
End: 2018-11-27

## 2018-11-27 NOTE — LETTER
PHYSICIAN ORDERS      DATE & TIME ISSUED: 2018 2:09 PM  PATIENT NAME: Meliza Krishnamurthy   : 1935     [facility] MR# [if applicable]: [id number]     DIAGNOSIS:  Kidney transplant  ICD-10 CODE: Z94.0      Please complete the following level during next lab draw  Mycophenolate level    Any questions please call: [phone] option #5    Please fax results to [phone].

## 2018-11-27 NOTE — TELEPHONE ENCOUNTER
Noemi Renee MD Lavelle Peterson, Meghan M, RN                     Sorry so late to respond.  I just realized we have no MPA levels ever.  Let's do that first.  Thanks            Previous Messages       ----- Message -----      From: Meagan Devine, RN      Sent: 2018   3:44 PM        To: Noemi Renee MD   Subject: Please review DSA, advise on IS                   Any change for immunosuppression? Or continue on current doses?            LPN TASK:   Send lab order to check MPA level with next set of labs  Looks like lab orders will  with next set of labs, update annual order as well.

## 2018-11-27 NOTE — LETTER
[facility]  Room 2[address]200  [facility],  [address]  [address]  Tel [phone]  Toll Free [phone]                OUTPATIENT LABORATORY TEST ORDER    Patient Name: Meliza Krishnamurthy  Transplant Date: 1/6/1998 (Kidney)  YOB: 1935  Issue Date & Time:November 27, 20182:05 PM    [facility] MR:  [id number]  Exp. Date (1 year after date issued)      Diagnoses: Kidney Transplant (ICD-9  V42.0)   Long term use of medications (ICD-9  V58.69)     Lab results to be available on the same day drawn.   Patient should release information to the [facility].  Please fax to the Transplant Center at [phone].    Every 3 Monthly    ?Hemogram and Platelet   ?Basic Metabolic Panel (Sodium, Potassium, Chloride, CO2, Creatinine, BUN, Glucose, Calcium)           ?CSA mail to [facility] - [facility] mailers and instructions provided by the patient)                   Every 6 Months                                                      ?Urine for protein/creatinine      If you have any questions, please call [facility] at [phone] or [phone].    Please fax labs to [phone]

## 2018-12-14 LAB
CHOLEST SERPL-MCNC: 214 MG/DL
CREAT SERPL-MCNC: 0.83 MG/DL (ref 0.59–1.04)
GFR SERPL CREATININE-BSD FRML MDRD: 65 ML/MIN/BSA
HDLC SERPL-MCNC: 83 MG/DL
LDLC SERPL CALC-MCNC: 108 MG/DL
NONHDLC SERPL-MCNC: 131 MG/DL
POTASSIUM SERPL-SCNC: 4 MMOL/L (ref 3.6–5.2)
TRIGL SERPL-MCNC: 114 MG/DL

## 2019-01-25 PROCEDURE — 80158 DRUG ASSAY CYCLOSPORINE: CPT | Performed by: INTERNAL MEDICINE

## 2019-01-28 LAB
CYCLOSPORINE BLD LC/MS/MS-MCNC: 45 UG/L (ref 50–400)
TME LAST DOSE: ABNORMAL H

## 2019-01-29 ENCOUNTER — TELEPHONE (OUTPATIENT)
Dept: TRANSPLANT | Facility: CLINIC | Age: 84
End: 2019-01-29

## 2019-01-29 NOTE — TELEPHONE ENCOUNTER
Issue:  Cyclosporine level 45 (goal 75). Current dose 75 mg in the am, 50 mg in the pm.    Plan:  Call Meliza to confirm this was a good 12 hour trough, confirm current dose, assess for any missed doses, illness or new medications. Request patient get a level redrawn in a couple of weeks.     Outcome:   Called Meliza Krishnamurthy, no answer. Detailed voice message left with a request for a call back.

## 2019-01-29 NOTE — LETTER
PHYSICIAN ORDERS      DATE & TIME ISSUED: 2019 11:48 AM  PATIENT NAME: Meliza Krishnamurthy   : 1935     [facility] MR# [if applicable]: [id number]     DIAGNOSIS:  Kidney transplant   ICD-10 CODE: Z94.0      Please complete the following level in 1-2 weeks  Cyclosporine level    Any questions please call: [phone] option #5    Please fax results to [phone].

## 2019-01-31 NOTE — TELEPHONE ENCOUNTER
Call returned from Meliza Krishnamurthy. She reports this we a good 12 hour trough and denies any new meds or recent illness. No dose change now, she will repeat CSA level in 1-2 weeks.    LPN task:    Please send order to repeat CSA level to Meliza Krishnamurthy's MyChart acct. She will bring hard copy of order to lab.

## 2019-02-12 ENCOUNTER — APPOINTMENT (RX ONLY)
Dept: URBAN - METROPOLITAN AREA CLINIC 149 | Facility: CLINIC | Age: 84
Setting detail: DERMATOLOGY
End: 2019-02-12

## 2019-02-12 DIAGNOSIS — D18.0 HEMANGIOMA: ICD-10-CM

## 2019-02-12 DIAGNOSIS — Z85.828 PERSONAL HISTORY OF OTHER MALIGNANT NEOPLASM OF SKIN: ICD-10-CM

## 2019-02-12 DIAGNOSIS — L82.1 OTHER SEBORRHEIC KERATOSIS: ICD-10-CM

## 2019-02-12 DIAGNOSIS — D22 MELANOCYTIC NEVI: ICD-10-CM

## 2019-02-12 DIAGNOSIS — L57.0 ACTINIC KERATOSIS: ICD-10-CM

## 2019-02-12 PROBLEM — D22.62 MELANOCYTIC NEVI OF LEFT UPPER LIMB, INCLUDING SHOULDER: Status: ACTIVE | Noted: 2019-02-12

## 2019-02-12 PROBLEM — I10 ESSENTIAL (PRIMARY) HYPERTENSION: Status: ACTIVE | Noted: 2019-02-12

## 2019-02-12 PROBLEM — D48.5 NEOPLASM OF UNCERTAIN BEHAVIOR OF SKIN: Status: ACTIVE | Noted: 2019-02-12

## 2019-02-12 PROBLEM — D18.01 HEMANGIOMA OF SKIN AND SUBCUTANEOUS TISSUE: Status: ACTIVE | Noted: 2019-02-12

## 2019-02-12 PROBLEM — N18.9 CHRONIC KIDNEY DISEASE, UNSPECIFIED: Status: ACTIVE | Noted: 2019-02-12

## 2019-02-12 PROBLEM — D22.61 MELANOCYTIC NEVI OF RIGHT UPPER LIMB, INCLUDING SHOULDER: Status: ACTIVE | Noted: 2019-02-12

## 2019-02-12 PROBLEM — D22.5 MELANOCYTIC NEVI OF TRUNK: Status: ACTIVE | Noted: 2019-02-12

## 2019-02-12 PROBLEM — E78.5 HYPERLIPIDEMIA, UNSPECIFIED: Status: ACTIVE | Noted: 2019-02-12

## 2019-02-12 PROCEDURE — 17000 DESTRUCT PREMALG LESION: CPT | Mod: 59

## 2019-02-12 PROCEDURE — ? LIQUID NITROGEN

## 2019-02-12 PROCEDURE — ? COUNSELING

## 2019-02-12 PROCEDURE — 99214 OFFICE O/P EST MOD 30 MIN: CPT | Mod: 25

## 2019-02-12 PROCEDURE — 17003 DESTRUCT PREMALG LES 2-14: CPT

## 2019-02-12 PROCEDURE — ? BIOPSY BY SHAVE METHOD

## 2019-02-12 PROCEDURE — 11103 TANGNTL BX SKIN EA SEP/ADDL: CPT

## 2019-02-12 PROCEDURE — ? INVENTORY

## 2019-02-12 PROCEDURE — 11102 TANGNTL BX SKIN SINGLE LES: CPT

## 2019-02-12 ASSESSMENT — LOCATION SIMPLE DESCRIPTION DERM
LOCATION SIMPLE: CHEST
LOCATION SIMPLE: ABDOMEN
LOCATION SIMPLE: LEFT UPPER ARM
LOCATION SIMPLE: RIGHT FOREARM
LOCATION SIMPLE: LEFT UPPER BACK
LOCATION SIMPLE: RIGHT UPPER ARM
LOCATION SIMPLE: LEFT THIGH
LOCATION SIMPLE: RIGHT UPPER BACK
LOCATION SIMPLE: NOSE
LOCATION SIMPLE: LEFT CHEEK
LOCATION SIMPLE: RIGHT EAR
LOCATION SIMPLE: LEFT FOREARM
LOCATION SIMPLE: RIGHT THIGH

## 2019-02-12 ASSESSMENT — LOCATION DETAILED DESCRIPTION DERM
LOCATION DETAILED: RIGHT ANTIHELIX
LOCATION DETAILED: NASAL TIP
LOCATION DETAILED: RIGHT ANTERIOR DISTAL THIGH
LOCATION DETAILED: LEFT INFERIOR CENTRAL MALAR CHEEK
LOCATION DETAILED: RIGHT MEDIAL UPPER BACK
LOCATION DETAILED: RIGHT LATERAL ABDOMEN
LOCATION DETAILED: RIGHT ANTERIOR PROXIMAL UPPER ARM
LOCATION DETAILED: LEFT ANTERIOR DISTAL THIGH
LOCATION DETAILED: RIGHT ANTERIOR DISTAL UPPER ARM
LOCATION DETAILED: LEFT RIB CAGE
LOCATION DETAILED: LEFT ANTERIOR PROXIMAL UPPER ARM
LOCATION DETAILED: LEFT MEDIAL SUPERIOR CHEST
LOCATION DETAILED: LEFT MID-UPPER BACK
LOCATION DETAILED: RIGHT VENTRAL PROXIMAL FOREARM
LOCATION DETAILED: LEFT ANTERIOR PROXIMAL THIGH
LOCATION DETAILED: LEFT VENTRAL PROXIMAL FOREARM

## 2019-02-12 ASSESSMENT — LOCATION ZONE DERM
LOCATION ZONE: NOSE
LOCATION ZONE: LEG
LOCATION ZONE: FACE
LOCATION ZONE: EAR
LOCATION ZONE: ARM
LOCATION ZONE: TRUNK

## 2019-02-12 NOTE — PROCEDURE: BIOPSY BY SHAVE METHOD
Type Of Destruction Used: Curettage
Biopsy Type: H and E
X Size Of Lesion In Cm: 0
Post-Care Instructions: I reviewed with the patient in detail post-care instructions. Patient is to keep the biopsy site dry overnight, and then apply bacitracin or vaseline twice daily until healed. Patient may apply hydrogen peroxide soaks to remove any crusting.
Anesthesia Volume In Cc (Will Not Render If 0): 0.5
Electrodesiccation And Curettage Text: The wound bed was treated with electrodesiccation and curettage after the biopsy was performed.
Billing Type: Third-Party Bill
Body Location Override (Optional - Billing Will Still Be Based On Selected Body Map Location If Applicable): R nasal tip
Silver Nitrate Text: The wound bed was treated with silver nitrate after the biopsy was performed.
Dressing: bandage
Hemostasis: Aluminum Chloride
Notification Instructions: Patient will be notified of biopsy results if a further procedure is needed. However, patient may call the office if not contacted within 2 weeks to find out results.
Was A Bandage Applied: Yes
Consent: Written consent was obtained and risks were reviewed including but not limited to scarring, infection, bleeding, scabbing, incomplete removal, nerve damage and allergy to anesthesia.
Bill For Surgical Tray: no
Lab: 6
Detail Level: Detailed
Biopsy Method: double edge Personna blade
Curettage Text: The wound bed was treated with curettage after the biopsy was performed.
Cryotherapy Text: The wound bed was treated with cryotherapy after the biopsy was performed.
Wound Care: Vaseline
Anesthesia Type: 1% lidocaine with epinephrine
Lab Facility: 3
Depth Of Biopsy: dermis
Electrodesiccation Text: The wound bed was treated with electrodesiccation after the biopsy was performed.

## 2019-02-12 NOTE — PROCEDURE: LIQUID NITROGEN
Post-Care Instructions: I reviewed with the patient in detail post-care instructions. Patient is to wear sunprotection, and avoid picking at any of the treated lesions. Pt may apply Vaseline to crusted or scabbing areas.
Duration Of Freeze Thaw-Cycle (Seconds): 2
Render Post-Care Instructions In Note?: no
Number Of Freeze-Thaw Cycles: 2 freeze-thaw cycles
Consent: The patient's consent was obtained including but not limited to risks of crusting, scabbing, blistering, scarring, darker or lighter pigmentary change, recurrence, incomplete removal and infection.
Detail Level: Simple

## 2019-03-06 ENCOUNTER — RX ONLY (OUTPATIENT)
Age: 84
Setting detail: RX ONLY
End: 2019-03-06

## 2019-03-06 RX ORDER — CEPHALEXIN 500 MG/1
500MG CAPSULE ORAL ONCE
Qty: 4 | Refills: 0 | Status: ERX | COMMUNITY
Start: 2019-03-06

## 2019-03-20 ENCOUNTER — APPOINTMENT (RX ONLY)
Dept: URBAN - METROPOLITAN AREA CLINIC 148 | Facility: CLINIC | Age: 84
Setting detail: DERMATOLOGY
End: 2019-03-20

## 2019-03-20 PROBLEM — C44.42 SQUAMOUS CELL CARCINOMA OF SKIN OF SCALP AND NECK: Status: ACTIVE | Noted: 2019-03-20

## 2019-03-20 PROCEDURE — ? MOHS SURGERY

## 2019-03-20 PROCEDURE — 17311 MOHS 1 STAGE H/N/HF/G: CPT

## 2019-03-20 PROCEDURE — 13121 CMPLX RPR S/A/L 2.6-7.5 CM: CPT

## 2019-03-20 PROCEDURE — 17312 MOHS ADDL STAGE: CPT

## 2019-04-03 ENCOUNTER — APPOINTMENT (RX ONLY)
Dept: URBAN - METROPOLITAN AREA CLINIC 148 | Facility: CLINIC | Age: 84
Setting detail: DERMATOLOGY
End: 2019-04-03

## 2019-04-03 DIAGNOSIS — Z48.02 ENCOUNTER FOR REMOVAL OF SUTURES: ICD-10-CM

## 2019-04-03 PROCEDURE — ? SUTURE REMOVAL (GLOBAL PERIOD)

## 2019-04-03 PROCEDURE — 99024 POSTOP FOLLOW-UP VISIT: CPT

## 2019-04-03 ASSESSMENT — LOCATION SIMPLE DESCRIPTION DERM: LOCATION SIMPLE: SCALP

## 2019-04-03 ASSESSMENT — LOCATION ZONE DERM: LOCATION ZONE: SCALP

## 2019-04-03 ASSESSMENT — LOCATION DETAILED DESCRIPTION DERM: LOCATION DETAILED: LEFT CENTRAL PARIETAL SCALP

## 2019-04-03 NOTE — PROCEDURE: SUTURE REMOVAL (GLOBAL PERIOD)
Detail Level: Detailed
Add 04120 Cpt? (Important Note: In 2017 The Use Of 57876 Is Being Tracked By Cms To Determine Future Global Period Reimbursement For Global Periods): yes

## 2019-04-10 ENCOUNTER — APPOINTMENT (RX ONLY)
Dept: URBAN - METROPOLITAN AREA CLINIC 149 | Facility: CLINIC | Age: 84
Setting detail: DERMATOLOGY
End: 2019-04-10

## 2019-04-10 DIAGNOSIS — L57.0 ACTINIC KERATOSIS: ICD-10-CM

## 2019-04-10 DIAGNOSIS — L82.0 INFLAMED SEBORRHEIC KERATOSIS: ICD-10-CM

## 2019-04-10 DIAGNOSIS — L81.4 OTHER MELANIN HYPERPIGMENTATION: ICD-10-CM

## 2019-04-10 PROCEDURE — 17000 DESTRUCT PREMALG LESION: CPT | Mod: 59

## 2019-04-10 PROCEDURE — 99213 OFFICE O/P EST LOW 20 MIN: CPT | Mod: 25

## 2019-04-10 PROCEDURE — 17110 DESTRUCTION B9 LES UP TO 14: CPT

## 2019-04-10 PROCEDURE — ? LIQUID NITROGEN

## 2019-04-10 PROCEDURE — ? COUNSELING

## 2019-04-10 PROCEDURE — ? PATHOLOGY DISCUSSION

## 2019-04-10 ASSESSMENT — LOCATION SIMPLE DESCRIPTION DERM
LOCATION SIMPLE: RIGHT THIGH
LOCATION SIMPLE: RIGHT FOREARM
LOCATION SIMPLE: NOSE

## 2019-04-10 ASSESSMENT — LOCATION ZONE DERM
LOCATION ZONE: ARM
LOCATION ZONE: LEG
LOCATION ZONE: NOSE

## 2019-04-10 ASSESSMENT — LOCATION DETAILED DESCRIPTION DERM
LOCATION DETAILED: NASAL TIP
LOCATION DETAILED: RIGHT ANTERIOR DISTAL THIGH
LOCATION DETAILED: RIGHT PROXIMAL DORSAL FOREARM

## 2019-04-10 NOTE — PROCEDURE: LIQUID NITROGEN
Post-Care Instructions: I reviewed with the patient in detail post-care instructions. Patient is to wear sunprotection, and avoid picking at any of the treated lesions. Pt may apply Vaseline to crusted or scabbing areas.
Render Post-Care Instructions In Note?: yes
Duration Of Freeze Thaw-Cycle (Seconds): 0
Number Of Freeze-Thaw Cycles: 2 freeze-thaw cycles
Detail Level: Detailed
Consent: The patient's consent was obtained including but not limited to risks of crusting, scabbing, blistering, scarring, darker or lighter pigmentary change, recurrence, incomplete removal and infection.
Duration Of Freeze Thaw-Cycle (Seconds): 3
Medical Necessity Clause: This procedure was medically necessary because the lesions that were treated were: at risk for and/or subject to recurrent physical trauma as a result of lesion type and location with history of the same, bleeding and irritated.
Add 52 Modifier (Optional): no
Medical Necessity Information: It is in your best interest to select a reason for this procedure from the list below. All of these items fulfill various CMS LCD requirements except the new and changing color options.
Detail Level: Simple

## 2019-04-29 LAB
ALT SERPL-CCNC: 30 U/L (ref 7–45)
AST SERPL-CCNC: 35 U/L (ref 8–43)
EJECTION FRACTION: 54 %
TSH SERPL-ACNC: 2 MLU/L (ref 0–4.2)

## 2019-05-10 ENCOUNTER — TRANSFERRED RECORDS (OUTPATIENT)
Dept: HEALTH INFORMATION MANAGEMENT | Facility: CLINIC | Age: 84
End: 2019-05-10

## 2019-05-12 PROCEDURE — 80158 DRUG ASSAY CYCLOSPORINE: CPT | Performed by: INTERNAL MEDICINE

## 2019-05-13 ENCOUNTER — TRANSFERRED RECORDS (OUTPATIENT)
Dept: HEALTH INFORMATION MANAGEMENT | Facility: CLINIC | Age: 84
End: 2019-05-13

## 2019-05-14 LAB
CYCLOSPORINE BLD LC/MS/MS-MCNC: 83 UG/L (ref 50–400)
TME LAST DOSE: NORMAL H

## 2019-05-16 ENCOUNTER — TRANSFERRED RECORDS (OUTPATIENT)
Dept: HEALTH INFORMATION MANAGEMENT | Facility: CLINIC | Age: 84
End: 2019-05-16

## 2019-05-17 DIAGNOSIS — Z48.298 AFTERCARE FOLLOWING ORGAN TRANSPLANT: Primary | ICD-10-CM

## 2019-06-04 ENCOUNTER — TRANSFERRED RECORDS (OUTPATIENT)
Dept: HEALTH INFORMATION MANAGEMENT | Facility: CLINIC | Age: 84
End: 2019-06-04

## 2019-06-17 ENCOUNTER — TRANSFERRED RECORDS (OUTPATIENT)
Dept: HEALTH INFORMATION MANAGEMENT | Facility: CLINIC | Age: 84
End: 2019-06-17

## 2019-06-26 ENCOUNTER — TRANSFERRED RECORDS (OUTPATIENT)
Dept: HEALTH INFORMATION MANAGEMENT | Facility: CLINIC | Age: 84
End: 2019-06-26

## 2019-07-12 ENCOUNTER — HOSPITAL ENCOUNTER (OUTPATIENT)
Facility: CLINIC | Age: 84
Setting detail: SPECIMEN
Discharge: HOME | End: 2019-07-12
Admitting: INTERNAL MEDICINE
Payer: MEDICARE

## 2019-07-12 PROCEDURE — 80158 DRUG ASSAY CYCLOSPORINE: CPT | Performed by: INTERNAL MEDICINE

## 2019-07-13 DIAGNOSIS — Z48.298 AFTERCARE FOLLOWING ORGAN TRANSPLANT: ICD-10-CM

## 2019-07-14 LAB
CYCLOSPORINE BLD LC/MS/MS-MCNC: 87 UG/L (ref 50–400)
TME LAST DOSE: NORMAL H

## 2019-07-17 LAB — EJECTION FRACTION: 57 %

## 2019-07-31 ENCOUNTER — TRANSFERRED RECORDS (OUTPATIENT)
Dept: HEALTH INFORMATION MANAGEMENT | Facility: CLINIC | Age: 84
End: 2019-07-31

## 2019-08-01 ENCOUNTER — TRANSFERRED RECORDS (OUTPATIENT)
Dept: HEALTH INFORMATION MANAGEMENT | Facility: CLINIC | Age: 84
End: 2019-08-01

## 2019-08-01 LAB
CREAT SERPL-MCNC: 0.94 MG/DL (ref 0.59–1.04)
EJECTION FRACTION: 53 %
GFR SERPL CREATININE-BSD FRML MDRD: 56 ML/MIN/BSA
GLUCOSE SERPL-MCNC: 85 MG/DL (ref 70–140)
POTASSIUM SERPL-SCNC: 3.8 MMOL/L (ref 3.6–5.2)

## 2019-08-05 ENCOUNTER — TRANSFERRED RECORDS (OUTPATIENT)
Dept: HEALTH INFORMATION MANAGEMENT | Facility: CLINIC | Age: 84
End: 2019-08-05

## 2019-08-07 LAB
CREAT SERPL-MCNC: 0.88 MG/DL (ref 0.59–1.04)
GFR SERPL CREATININE-BSD FRML MDRD: 61 ML/MIN/BSA
GLUCOSE SERPL-MCNC: 88 MG/DL (ref 70–140)
POTASSIUM SERPL-SCNC: 4 MMOL/L (ref 3.6–5.2)

## 2019-08-16 ENCOUNTER — TRANSFERRED RECORDS (OUTPATIENT)
Dept: HEALTH INFORMATION MANAGEMENT | Facility: CLINIC | Age: 84
End: 2019-08-16

## 2019-08-19 ENCOUNTER — TRANSFERRED RECORDS (OUTPATIENT)
Dept: HEALTH INFORMATION MANAGEMENT | Facility: CLINIC | Age: 84
End: 2019-08-19

## 2019-08-19 LAB
ALT SERPL-CCNC: 16 U/L (ref 7–45)
AST SERPL-CCNC: 31 U/L (ref 8–43)
TSH SERPL-ACNC: 2.9 MIU/L (ref 0.3–4.2)

## 2019-08-22 LAB
CREAT SERPL-MCNC: 0.84 MG/DL (ref 0.59–1.04)
GFR SERPL CREATININE-BSD FRML MDRD: 64 ML/MIN/BSA
GLUCOSE SERPL-MCNC: 81 MG/DL (ref 70–140)
INR PPP: 2.2 (ref 0.9–1.2)
POTASSIUM SERPL-SCNC: 3.9 MMOL/L (ref 3.6–5.2)

## 2019-08-26 ENCOUNTER — TRANSFERRED RECORDS (OUTPATIENT)
Dept: HEALTH INFORMATION MANAGEMENT | Facility: CLINIC | Age: 84
End: 2019-08-26

## 2019-08-30 ENCOUNTER — TRANSFERRED RECORDS (OUTPATIENT)
Dept: HEALTH INFORMATION MANAGEMENT | Facility: CLINIC | Age: 84
End: 2019-08-30

## 2019-08-30 LAB
CREAT SERPL-MCNC: 0.96 MG/DL (ref 0.59–1.04)
GFR SERPL CREATININE-BSD FRML MDRD: 55 ML/MIN/BSA
GLUCOSE SERPL-MCNC: 80 MG/DL (ref 70–140)
POTASSIUM SERPL-SCNC: 4.3 MMOL/L (ref 3.6–5.2)

## 2019-09-10 LAB
CREAT SERPL-MCNC: 1.05 MG/DL (ref 0.59–1.04)
GFR SERPL CREATININE-BSD FRML MDRD: 49 ML/MIN/1.73M2
GLUCOSE SERPL-MCNC: 91 MG/DL (ref 70–140)
POTASSIUM SERPL-SCNC: 3.4 MMOL/L (ref 3.6–5.2)

## 2019-09-16 ENCOUNTER — TRANSFERRED RECORDS (OUTPATIENT)
Dept: HEALTH INFORMATION MANAGEMENT | Facility: CLINIC | Age: 84
End: 2019-09-16

## 2019-09-16 LAB
CREAT SERPL-MCNC: 1.1 MG/DL (ref 0.59–1.04)
GFR SERPL CREATININE-BSD FRML MDRD: 47 ML/MIN/BSA
GLUCOSE SERPL-MCNC: 86 MG/DL (ref 70–140)
POTASSIUM SERPL-SCNC: 4.2 MMOL/L (ref 3.6–5.2)

## 2019-09-18 ENCOUNTER — TRANSFERRED RECORDS (OUTPATIENT)
Dept: HEALTH INFORMATION MANAGEMENT | Facility: CLINIC | Age: 84
End: 2019-09-18

## 2019-09-18 LAB — INR PPP: 4.2 (ref 0.9–1.2)

## 2019-09-23 ENCOUNTER — TRANSFERRED RECORDS (OUTPATIENT)
Dept: HEALTH INFORMATION MANAGEMENT | Facility: CLINIC | Age: 84
End: 2019-09-23

## 2019-09-23 LAB
CREAT SERPL-MCNC: 1.06 MG/DL (ref 0.59–1.04)
GFR SERPL CREATININE-BSD FRML MDRD: 49 ML/MIN/BSA
GLUCOSE SERPL-MCNC: 97 MG/DL (ref 70–140)
INR PPP: 5.4 (ref 0.9–1.2)
POTASSIUM SERPL-SCNC: 4.4 MMOL/L (ref 3.6–5.2)

## 2019-09-27 LAB — INR PPP: 2.6 (ref 0.9–1.2)

## 2019-10-02 ENCOUNTER — HEALTH MAINTENANCE LETTER (OUTPATIENT)
Age: 84
End: 2019-10-02

## 2019-10-04 ENCOUNTER — TRANSFERRED RECORDS (OUTPATIENT)
Dept: HEALTH INFORMATION MANAGEMENT | Facility: CLINIC | Age: 84
End: 2019-10-04

## 2019-10-14 ENCOUNTER — TRANSFERRED RECORDS (OUTPATIENT)
Dept: HEALTH INFORMATION MANAGEMENT | Facility: CLINIC | Age: 84
End: 2019-10-14

## 2019-10-21 ENCOUNTER — TRANSFERRED RECORDS (OUTPATIENT)
Dept: HEALTH INFORMATION MANAGEMENT | Facility: CLINIC | Age: 84
End: 2019-10-21

## 2019-10-22 ENCOUNTER — TRANSFERRED RECORDS (OUTPATIENT)
Dept: HEALTH INFORMATION MANAGEMENT | Facility: CLINIC | Age: 84
End: 2019-10-22

## 2019-10-25 ENCOUNTER — TRANSFERRED RECORDS (OUTPATIENT)
Dept: HEALTH INFORMATION MANAGEMENT | Facility: CLINIC | Age: 84
End: 2019-10-25

## 2019-10-28 ENCOUNTER — TRANSFERRED RECORDS (OUTPATIENT)
Dept: HEALTH INFORMATION MANAGEMENT | Facility: CLINIC | Age: 84
End: 2019-10-28

## 2019-10-28 DIAGNOSIS — Z48.298 AFTERCARE FOLLOWING ORGAN TRANSPLANT: ICD-10-CM

## 2019-10-28 PROCEDURE — 80158 DRUG ASSAY CYCLOSPORINE: CPT | Performed by: INTERNAL MEDICINE

## 2019-10-31 LAB
CYCLOSPORINE BLD LC/MS/MS-MCNC: 69 UG/L (ref 50–400)
TME LAST DOSE: NORMAL H

## 2019-11-01 ENCOUNTER — TELEPHONE (OUTPATIENT)
Dept: TRANSPLANT | Facility: CLINIC | Age: 84
End: 2019-11-01

## 2019-11-01 NOTE — TELEPHONE ENCOUNTER
----- Message from Meagan Myrick RN sent at 10/31/2019  2:18 PM CDT -----  Regarding: Transplant nephrology soonest available, former [facility] patient  Appointment Request: Annual transplant nephrology  Orders Placed: Yes   Patient Aware? Yes.  Physician Override Approved? N/A  Appointment Timeframe Requested: Soonest available    Thank you,

## 2019-11-04 ENCOUNTER — TELEPHONE (OUTPATIENT)
Dept: TRANSPLANT | Facility: CLINIC | Age: 84
End: 2019-11-04

## 2019-11-04 NOTE — LETTER
PHYSICIAN ORDERS      DATE & TIME ISSUED: 2019 4:45 PM  PATIENT NAME: Meliza Krishnamurthy   : 1935     [facility] MR# [if applicable]: [id number]     DIAGNOSIS:  Kidney transplant   ICD-10 CODE: Z94.0      Please complete the following lab in one week  BMP  Urine protein level (repeat)    Any questions please call: [phone] option 5    Please fax results to [phone].

## 2019-12-16 ENCOUNTER — HEALTH MAINTENANCE LETTER (OUTPATIENT)
Age: 84
End: 2019-12-16

## 2019-12-30 ENCOUNTER — TELEPHONE (OUTPATIENT)
Dept: TRANSPLANT | Facility: CLINIC | Age: 84
End: 2019-12-30

## 2019-12-30 DIAGNOSIS — Z94.0 KIDNEY TRANSPLANTED: Primary | ICD-10-CM

## 2019-12-30 DIAGNOSIS — R79.89 ELEVATED SERUM CREATININE: ICD-10-CM

## 2019-12-30 NOTE — TELEPHONE ENCOUNTER
Pt calling- has appointment today at 4  wth the weather she is unsure if she can make it  Will be leaving soon for 3 months to [place]  Wonders if we can recommend anyone there to see Concerned with her Cr has been rising

## 2019-12-30 NOTE — TELEPHONE ENCOUNTER
Meliza called this afternoon, unable to come to clinic this afternoon due to weather. Dr. Aquino ok to reschedule tomorrow.   Meliza Krishnamurthy able to arrive to clinic at 11:30, will message schedulers.   Will repeat BMP tomorrow, order placed, Meliza weiner/clinton of plan.

## 2019-12-30 NOTE — TELEPHONE ENCOUNTER
Ok for earlier appt per Dr. Aquino, patient will begin driving now.    Cr 1.28. No recent dehydration. She has started amlodipine this month. Will discuss in clinic visit.

## 2019-12-31 ENCOUNTER — OFFICE VISIT (OUTPATIENT)
Dept: NEPHROLOGY | Facility: CLINIC | Age: 84
End: 2019-12-31
Attending: INTERNAL MEDICINE
Payer: MEDICARE

## 2019-12-31 ENCOUNTER — TELEPHONE (OUTPATIENT)
Dept: TRANSPLANT | Facility: CLINIC | Age: 84
End: 2019-12-31

## 2019-12-31 VITALS
OXYGEN SATURATION: 97 % | HEART RATE: 65 BPM | SYSTOLIC BLOOD PRESSURE: 129 MMHG | DIASTOLIC BLOOD PRESSURE: 61 MMHG | BODY MASS INDEX: 18.74 KG/M2 | WEIGHT: 109.2 LBS

## 2019-12-31 DIAGNOSIS — R79.89 ELEVATED SERUM CREATININE: ICD-10-CM

## 2019-12-31 DIAGNOSIS — Z29.89 NEED FOR PNEUMOCYSTIS PROPHYLAXIS: ICD-10-CM

## 2019-12-31 DIAGNOSIS — D84.9 IMMUNOSUPPRESSION (H): ICD-10-CM

## 2019-12-31 DIAGNOSIS — I15.1 HTN, KIDNEY TRANSPLANT RELATED: ICD-10-CM

## 2019-12-31 DIAGNOSIS — E55.9 HYPOVITAMINOSIS D: ICD-10-CM

## 2019-12-31 DIAGNOSIS — E83.52 HYPERCALCEMIA: Primary | ICD-10-CM

## 2019-12-31 DIAGNOSIS — Z94.0 KIDNEY TRANSPLANTED: ICD-10-CM

## 2019-12-31 DIAGNOSIS — E83.52 HYPERCALCEMIA: ICD-10-CM

## 2019-12-31 DIAGNOSIS — E87.1 HYPONATREMIA: ICD-10-CM

## 2019-12-31 DIAGNOSIS — Z94.0 STATUS POST KIDNEY TRANSPLANT: Primary | ICD-10-CM

## 2019-12-31 DIAGNOSIS — Z94.0 HTN, KIDNEY TRANSPLANT RELATED: ICD-10-CM

## 2019-12-31 LAB
ANION GAP SERPL CALCULATED.3IONS-SCNC: 5 MMOL/L (ref 3–14)
BUN SERPL-MCNC: 16 MG/DL (ref 7–30)
CALCIUM SERPL-MCNC: 9.6 MG/DL (ref 8.5–10.1)
CHLORIDE SERPL-SCNC: 108 MMOL/L (ref 94–109)
CO2 SERPL-SCNC: 26 MMOL/L (ref 20–32)
CREAT SERPL-MCNC: 0.76 MG/DL (ref 0.52–1.04)
GFR SERPL CREATININE-BSD FRML MDRD: 71 ML/MIN/{1.73_M2}
GLUCOSE SERPL-MCNC: 71 MG/DL (ref 70–99)
POTASSIUM SERPL-SCNC: 3.8 MMOL/L (ref 3.4–5.3)
SODIUM SERPL-SCNC: 139 MMOL/L (ref 133–144)

## 2019-12-31 PROCEDURE — 80048 BASIC METABOLIC PNL TOTAL CA: CPT | Performed by: INTERNAL MEDICINE

## 2019-12-31 PROCEDURE — 36415 COLL VENOUS BLD VENIPUNCTURE: CPT | Performed by: INTERNAL MEDICINE

## 2019-12-31 PROCEDURE — 82306 VITAMIN D 25 HYDROXY: CPT | Performed by: INTERNAL MEDICINE

## 2019-12-31 PROCEDURE — G0463 HOSPITAL OUTPT CLINIC VISIT: HCPCS | Mod: ZF

## 2019-12-31 RX ORDER — POTASSIUM CHLORIDE 1500 MG/1
20 TABLET, EXTENDED RELEASE ORAL 2 TIMES DAILY
COMMUNITY
Start: 2019-10-14

## 2019-12-31 RX ORDER — FAMOTIDINE 20 MG/1
20 TABLET, FILM COATED ORAL 2 TIMES DAILY
COMMUNITY
Start: 2019-12-28

## 2019-12-31 RX ORDER — AMLODIPINE BESYLATE 5 MG/1
5 TABLET ORAL
Status: ON HOLD | COMMUNITY
Start: 2019-12-20 | End: 2020-09-08

## 2019-12-31 RX ORDER — WARFARIN SODIUM 2.5 MG/1
TABLET ORAL
COMMUNITY
Start: 2019-09-24 | End: 2021-01-01

## 2019-12-31 RX ORDER — FERROUS SULFATE 324(65)MG
324 TABLET, DELAYED RELEASE (ENTERIC COATED) ORAL 2 TIMES DAILY
COMMUNITY
Start: 2019-08-22

## 2019-12-31 ASSESSMENT — PAIN SCALES - GENERAL: PAINLEVEL: NO PAIN (0)

## 2019-12-31 NOTE — LETTER
12/31/2019       RE: Meliza Krishnamurthy  [address] [place] [address]     Dear Colleague,    Thank you for referring your patient, Meliza Krishnamurthy, to the [facility] NEPHROLOGY at [facility]. Please see a copy of my visit note below.    ACUTE TRANSPLANT NEPHROLOGY VISIT    Assessment & Plan   # DDKT: up to 1.28 on most recent, labs today pending.   - Baseline Cr ~ 0.9 mg / dl   - Proteinuria: Minimal (0.2-0.5 grams)   - Date DSA Last Checked: stable DQb7     Only follow if creatinine elevated or planning on decrease in immunosuppression   - BK Viremia: Not checked recently   - Kidney Tx Biopsy: No    The patient does have a elevated creatinine on her most recent check.  Her current creatinine however is back down to 0.7 mg/dL.  As such, I would make no changes to the patient's immunosuppression.  I told her she can go down to [place] as she had planned tomorrow.  She will need to get labs done in [place] if she stays past April.  When she returns we can repeat a donor specific antibody as I do not feel strongly that this is necessary given the return to baseline.    # Immunosuppression: Cyclosporine (goal 50-75), Mycophenolate mofetil (goal not followed) and Prednisone (dose 5 mg daily)   - Changes: No     # Infection Prophylaxis:   - PJP: Sulfa/TMP (Bactrim)    # Hypertension: Controlled;  Goal BP: < 130/80   - Volume status: Euvolemic   - Changes: No    # Anemia in Chronic Renal Disease: Hgb: Stable      ANTWON: No   - Iron studies: Not checked recently    # Mineral Bone Disorder:   - Secondary renal hyperparathyroidism; PTH level: Not checked recently  - Vitamin D; level: Not checked recently        On Supplement: No  - Calcium; level: Normal        On Supplement: No  - Phosphorus; level: Not checked recently        On Supplement: No    # Electrolytes:   - Sodium; level: Hyponatremic on most recent labs. Recheck today.     # skin cancer screen: follow with derm.    # Medical  Compliance: Yes    # Transplant History:  Etiology of Kidney Failure: PKD  Tx: DDKT  Transplant: 1998 (Kidney)  Donor Type: Donation after Brain Death Donor Class: Standard Criteria Donor    Transplant Office Phone Number: [phone]    Assessment and plan was discussed with the patient and she voiced her understanding and agreement.    Return visit: 12 months.    Donell Burton MD    Chief Complaint   Ms. Krishnamurthy is a 84 year old here for routine follow up and kidney transplant.     History of Present Illness      The patient is an 84-year-old female with history of PKD who is status post  donor kidney transplant from  here for follow-up of her kidney transplant.  The patient had recent creatinine check at the end of December that came back elevated at 1.28 mg/dL.  This was in the setting of no symptomology whatsoever.  The patient specifically denies any chest pain or breathing difficulties.  She has no nausea or vomiting.  She has no fever shakes or chills.  He is moving her bowels appropriately albeit with some intermittent constipation.    From an immunosuppression standpoint the patient is currently on cyclosporine, CellCept, and prednisone.  When she last saw her transplant nephrologist she was being considered for lowering the CellCept due to a history of skin cancer.  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  As such, no changes were made to her immunosuppression.    Drinks 8 glasses of 8 ounces for total of 64 ounces.    Needs pth and vitamin d checks.     No cp, sob, no n/v, no f/s/c. + constipation - metamucil     Stable known DSA.    Recent Hospitalizations:  [x] No [] Yes    New Medical Issues: [x] No [] Yes    Decreased energy: [x] No [] Yes    Chest pain or SOB with exertion:  [x] No [] Yes    Appetite change or weight change: [x] No [] Yes    Nausea, vomiting or diarrhea:  [x] No [] Yes    Fever, sweats or chills: [x]  No [] Yes    Leg swelling: [x] No [] Yes      Home BP: at goal    Review of Systems   A comprehensive review of systems was obtained and negative, except as noted in the HPI or PMH.    Problem List   Patient Active Problem List   Diagnosis     Carotid artery stenosis, asymptomatic     -donor kidney transplant     Polycystic kidney disease     S/P hip replacement     Renal hypertension     Social History   Social History     Tobacco Use     Smoking status: Never Smoker     Smokeless tobacco: Never Used   Substance Use Topics     Alcohol use: Yes     Comment: occ wine     Drug use: No     Allergies   No Known Allergies    Medications   Current Outpatient Medications   Medication Sig     amLODIPine (NORVASC) 5 MG tablet Take 5 mg by mouth     cholecalciferol (VITAMIN D-1000 MAX ST) 1000 units TABS Take 1,000 Units by mouth At Bedtime     cycloSPORINE modified (GENERIC EQUIVALENT) 25 MG capsule TAKE 3 CAPSULES BY MOUTH EVERY MORNING AND 2 CAPSULES EVERY EVENING     Ezetimibe (ZETIA PO) Take  by mouth. 10mg once per day     famotidine (PEPCID) 20 MG tablet      Ferrous Sulfate 324 (65 Fe) MG TBEC Take 65 mg of iron by mouth     Furosemide (LASIX PO) Take  by mouth. 20mg 1 tab per day     METOPROLOL TARTRATE 25mg. Twice daily.      Multiple Vitamins-Minerals (MULTIVITAL PO) Take  by mouth.     mycophenolate (GENERIC EQUIVALENT) 250 MG capsule TAKE 2 CAPSULES BY MOUTH EVERY MORNING AND 1 CAPSULE EVERY EVENING     potassium chloride ER (K-DUR/KLOR-CON M) 20 MEQ CR tablet Take 20 mEq by mouth     predniSONE (DELTASONE) 5 MG tablet Take 1 tablet by mouth daily.     sulfamethoxazole-trimethoprim (BACTRIM) 400-80 MG per tablet Take 1 tablet by mouth daily     warfarin ANTICOAGULANT (COUMADIN) 2.5 MG tablet Take 2.5 mg daily or as directed by coag clinic. (changing tablet strength)     ASPIRIN 81 mg per day     No current facility-administered medications for this visit.      There are no discontinued  medications.    Physical Exam   Vital Signs: /61   Pulse 65   Wt 49.5 kg (109 lb 3.2 oz)   SpO2 97%   BMI 18.74 kg/m       GENERAL APPEARANCE: alert and no distress  HENT: mouth without ulcers or lesions  LYMPHATICS: no cervical or supraclavicular nodes  RESP: lungs clear to auscultation - no rales, rhonchi or wheezes  CV: regular rhythm, normal rate, no rub, no murmur  EDEMA: no LE edema bilaterally  ABDOMEN: soft, nondistended, nontender, bowel sounds normal  MS: extremities normal - no gross deformities noted, no evidence of inflammation in joints, no muscle tenderness  SKIN: no rash    Data     Renal Latest Ref Rng & Units 12/27/2019 11/6/2019 10/28/2019   Na 133 - 144 mmol/L - - -   Na (external) 135 - 145 mmol/L 134(L) 129(L) 125(L)   K 3.4 - 5.3 mmol/L - - -   K (external) 3.6 - 5.2 mmol/L 4.9 3.8 4.3   Cl 94 - 109 mmol/L - - -   Cl (external) 98 - 107 mmol/L 98 90(L) 88(L)   CO2 20 - 32 mmol/L - - -   CO2 (external) 22 - 29 mmol/L 25 26 24   BUN 7 - 30 mg/dL - - -   BUN (external) 6 - 21 mg/dL 20 21 21   Cr 0.52 - 1.04 mg/dL - - -   Cr (external) 0.59 - 1.04 mg/dL 1.28(H) 0.94 1.04   Glucose 70 - 99 mg/dL - - -   Glucose (external) 70 - 140 mg/dL 79 89 71   Ca  8.5 - 10.1 mg/dL - - -   Ca (external) 8.8 - 10.2 mg/dL 10.4(H) 9.9 9.7   Mg 1.6 - 2.3 mg/dL - - -     Bone Health Latest Ref Rng & Units 9/20/2011 8/12/2008 7/11/2006   Phos 2.5 - 4.5 mg/dL 4.1 3.5 3.0   PTHi 12 - 72 pg/mL 103(H) 58 -     Heme Latest Ref Rng & Units 12/27/2019 11/6/2019 10/28/2019   WBC 4.0 - 11.0 10e9/L - - -   WBC (external) 3.4 - 9.6 10(9)/L 6.7 5.9 5.3   Hgb 11.7 - 15.7 g/dL - - -   Hgb (external) 11.6 - 15.0 g/dL 12.3 11.4(L) 11.9   Plt 150 - 450 10e9/L - - -   Plt (external) 157 - 371 10(9)/L 248 282 310     Liver Latest Ref Rng & Units 9/20/2011   Albumin 3.3 - 4.9 g/dL 4.4        Iron studies Latest Ref Rng & Units 10/30/2018   Iron 35 - 180 ug/dL 41   Iron sat 15 - 46 % 10(L)          Again, thank you for allowing  me to participate in the care of your patient.      Sincerely,    [facility]

## 2019-12-31 NOTE — NURSING NOTE
Meliza Krishnamurthy was seen today in clinic by this writer. Medications, lab orders, lab frequency, and necessary follow up discussed with patient. Patient was provided with a copy of the current lab letter. Patient voiced understanding and agreement of education and plan.    Kimberly Avalos RN

## 2019-12-31 NOTE — PROGRESS NOTES
ACUTE TRANSPLANT NEPHROLOGY VISIT    Assessment & Plan   # DDKT: up to 1.28 on most recent, labs today pending.   - Baseline Cr ~ 0.9 mg / dl   - Proteinuria: Minimal (0.2-0.5 grams)   - Date DSA Last Checked: stable DQb7     Only follow if creatinine elevated or planning on decrease in immunosuppression   - BK Viremia: Not checked recently   - Kidney Tx Biopsy: No    The patient does have a elevated creatinine on her most recent check.  Her current creatinine however is back down to 0.7 mg/dL.  As such, I would make no changes to the patient's immunosuppression.  I told her she can go down to [place] as she had planned tomorrow.  She will need to get labs done in [place] if she stays past April.  When she returns we can repeat a donor specific antibody as I do not feel strongly that this is necessary given the return to baseline.    # Immunosuppression: Cyclosporine (goal 50-75), Mycophenolate mofetil (goal not followed) and Prednisone (dose 5 mg daily)   - Changes: No     # Infection Prophylaxis:   - PJP: Sulfa/TMP (Bactrim)    # Hypertension: Controlled;  Goal BP: < 130/80   - Volume status: Euvolemic   - Changes: No    # Anemia in Chronic Renal Disease: Hgb: Stable      ANTWON: No   - Iron studies: Not checked recently    # Mineral Bone Disorder:   - Secondary renal hyperparathyroidism; PTH level: Not checked recently  - Vitamin D; level: Not checked recently        On Supplement: No  - Calcium; level: Normal        On Supplement: No  - Phosphorus; level: Not checked recently        On Supplement: No    # Electrolytes:   - Sodium; level: Hyponatremic on most recent labs. Recheck today.     # skin cancer screen: follow with derm.    # Medical Compliance: Yes    # Transplant History:  Etiology of Kidney Failure: PKD  Tx: DDKT  Transplant: 1/6/1998 (Kidney)  Donor Type: Donation after Brain Death Donor Class: Standard Criteria Donor    Transplant Office Phone Number: [phone]    Assessment and plan was discussed  with the patient and she voiced her understanding and agreement.    Return visit: 12 months.    Donell Burton MD    Chief Complaint   Ms. Krishnamurthy is a 84 year old here for routine follow up and kidney transplant.     History of Present Illness      The patient is an 84-year-old female with history of PKD who is status post  donor kidney transplant from  here for follow-up of her kidney transplant.  The patient had recent creatinine check at the end of December that came back elevated at 1.28 mg/dL.  This was in the setting of no symptomology whatsoever.  The patient specifically denies any chest pain or breathing difficulties.  She has no nausea or vomiting.  She has no fever shakes or chills.  He is moving her bowels appropriately albeit with some intermittent constipation.    From an immunosuppression standpoint the patient is currently on cyclosporine, CellCept, and prednisone.  When she last saw her transplant nephrologist she was being considered for lowering the CellCept due to a history of skin cancer.  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  As such, no changes were made to her immunosuppression.    Drinks 8 glasses of 8 ounces for total of 64 ounces.    Needs pth and vitamin d checks.     No cp, sob, no n/v, no f/s/c. + constipation - metamucil     Stable known DSA.    Recent Hospitalizations:  [x] No [] Yes    New Medical Issues: [x] No [] Yes    Decreased energy: [x] No [] Yes    Chest pain or SOB with exertion:  [x] No [] Yes    Appetite change or weight change: [x] No [] Yes    Nausea, vomiting or diarrhea:  [x] No [] Yes    Fever, sweats or chills: [x] No [] Yes    Leg swelling: [x] No [] Yes      Home BP: at goal    Review of Systems   A comprehensive review of systems was obtained and negative, except as noted in the HPI or PMH.    Problem List   Patient Active Problem List   Diagnosis     Carotid artery stenosis,  asymptomatic     -donor kidney transplant     Polycystic kidney disease     S/P hip replacement     Renal hypertension     Social History   Social History     Tobacco Use     Smoking status: Never Smoker     Smokeless tobacco: Never Used   Substance Use Topics     Alcohol use: Yes     Comment: occ wine     Drug use: No     Allergies   No Known Allergies    Medications   Current Outpatient Medications   Medication Sig     amLODIPine (NORVASC) 5 MG tablet Take 5 mg by mouth     cholecalciferol (VITAMIN D-1000 MAX ST) 1000 units TABS Take 1,000 Units by mouth At Bedtime     cycloSPORINE modified (GENERIC EQUIVALENT) 25 MG capsule TAKE 3 CAPSULES BY MOUTH EVERY MORNING AND 2 CAPSULES EVERY EVENING     Ezetimibe (ZETIA PO) Take  by mouth. 10mg once per day     famotidine (PEPCID) 20 MG tablet      Ferrous Sulfate 324 (65 Fe) MG TBEC Take 65 mg of iron by mouth     Furosemide (LASIX PO) Take  by mouth. 20mg 1 tab per day     METOPROLOL TARTRATE 25mg. Twice daily.      Multiple Vitamins-Minerals (MULTIVITAL PO) Take  by mouth.     mycophenolate (GENERIC EQUIVALENT) 250 MG capsule TAKE 2 CAPSULES BY MOUTH EVERY MORNING AND 1 CAPSULE EVERY EVENING     potassium chloride ER (K-DUR/KLOR-CON M) 20 MEQ CR tablet Take 20 mEq by mouth     predniSONE (DELTASONE) 5 MG tablet Take 1 tablet by mouth daily.     sulfamethoxazole-trimethoprim (BACTRIM) 400-80 MG per tablet Take 1 tablet by mouth daily     warfarin ANTICOAGULANT (COUMADIN) 2.5 MG tablet Take 2.5 mg daily or as directed by coag clinic. (changing tablet strength)     ASPIRIN 81 mg per day     No current facility-administered medications for this visit.      There are no discontinued medications.    Physical Exam   Vital Signs: /61   Pulse 65   Wt 49.5 kg (109 lb 3.2 oz)   SpO2 97%   BMI 18.74 kg/m      GENERAL APPEARANCE: alert and no distress  HENT: mouth without ulcers or lesions  LYMPHATICS: no cervical or supraclavicular nodes  RESP: lungs clear to  auscultation - no rales, rhonchi or wheezes  CV: regular rhythm, normal rate, no rub, no murmur  EDEMA: no LE edema bilaterally  ABDOMEN: soft, nondistended, nontender, bowel sounds normal  MS: extremities normal - no gross deformities noted, no evidence of inflammation in joints, no muscle tenderness  SKIN: no rash    Data     Renal Latest Ref Rng & Units 12/27/2019 11/6/2019 10/28/2019   Na 133 - 144 mmol/L - - -   Na (external) 135 - 145 mmol/L 134(L) 129(L) 125(L)   K 3.4 - 5.3 mmol/L - - -   K (external) 3.6 - 5.2 mmol/L 4.9 3.8 4.3   Cl 94 - 109 mmol/L - - -   Cl (external) 98 - 107 mmol/L 98 90(L) 88(L)   CO2 20 - 32 mmol/L - - -   CO2 (external) 22 - 29 mmol/L 25 26 24   BUN 7 - 30 mg/dL - - -   BUN (external) 6 - 21 mg/dL 20 21 21   Cr 0.52 - 1.04 mg/dL - - -   Cr (external) 0.59 - 1.04 mg/dL 1.28(H) 0.94 1.04   Glucose 70 - 99 mg/dL - - -   Glucose (external) 70 - 140 mg/dL 79 89 71   Ca  8.5 - 10.1 mg/dL - - -   Ca (external) 8.8 - 10.2 mg/dL 10.4(H) 9.9 9.7   Mg 1.6 - 2.3 mg/dL - - -     Bone Health Latest Ref Rng & Units 9/20/2011 8/12/2008 7/11/2006   Phos 2.5 - 4.5 mg/dL 4.1 3.5 3.0   PTHi 12 - 72 pg/mL 103(H) 58 -     Heme Latest Ref Rng & Units 12/27/2019 11/6/2019 10/28/2019   WBC 4.0 - 11.0 10e9/L - - -   WBC (external) 3.4 - 9.6 10(9)/L 6.7 5.9 5.3   Hgb 11.7 - 15.7 g/dL - - -   Hgb (external) 11.6 - 15.0 g/dL 12.3 11.4(L) 11.9   Plt 150 - 450 10e9/L - - -   Plt (external) 157 - 371 10(9)/L 248 282 310     Liver Latest Ref Rng & Units 9/20/2011   Albumin 3.3 - 4.9 g/dL 4.4        Iron studies Latest Ref Rng & Units 10/30/2018   Iron 35 - 180 ug/dL 41   Iron sat 15 - 46 % 10(L)

## 2019-12-31 NOTE — LETTER
12/31/2019      RE: Meliza Krishnamurthy  [address] [place] [address]       ACUTE TRANSPLANT NEPHROLOGY VISIT    Assessment & Plan   # DDKT: up to 1.28 on most recent, labs today pending.   - Baseline Cr ~ 0.9 mg / dl   - Proteinuria: Minimal (0.2-0.5 grams)   - Date DSA Last Checked: stable DQb7     Only follow if creatinine elevated or planning on decrease in immunosuppression   - BK Viremia: Not checked recently   - Kidney Tx Biopsy: No    The patient does have a elevated creatinine on her most recent check.  Her current creatinine however is back down to 0.7 mg/dL.  As such, I would make no changes to the patient's immunosuppression.  I told her she can go down to [place] as she had planned tomorrow.  She will need to get labs done in [place] if she stays past April.  When she returns we can repeat a donor specific antibody as I do not feel strongly that this is necessary given the return to baseline.    # Immunosuppression: Cyclosporine (goal 50-75), Mycophenolate mofetil (goal not followed) and Prednisone (dose 5 mg daily)   - Changes: No     # Infection Prophylaxis:   - PJP: Sulfa/TMP (Bactrim)    # Hypertension: Controlled;  Goal BP: < 130/80   - Volume status: Euvolemic   - Changes: No    # Anemia in Chronic Renal Disease: Hgb: Stable      ANTWON: No   - Iron studies: Not checked recently    # Mineral Bone Disorder:   - Secondary renal hyperparathyroidism; PTH level: Not checked recently  - Vitamin D; level: Not checked recently        On Supplement: No  - Calcium; level: Normal        On Supplement: No  - Phosphorus; level: Not checked recently        On Supplement: No    # Electrolytes:   - Sodium; level: Hyponatremic on most recent labs. Recheck today.     # skin cancer screen: follow with derm.    # Medical Compliance: Yes    # Transplant History:  Etiology of Kidney Failure: PKD  Tx: DDKT  Transplant: 1/6/1998 (Kidney)  Donor Type: Donation after Brain Death Donor Class: Standard Criteria  Donor    Transplant Office Phone Number: [phone]    Assessment and plan was discussed with the patient and she voiced her understanding and agreement.    Return visit: 12 months.    Donell Burton MD    Chief Complaint   Ms. Krishnamurthy is a 84 year old here for routine follow up and kidney transplant.     History of Present Illness      The patient is an 84-year-old female with history of PKD who is status post  donor kidney transplant from  here for follow-up of her kidney transplant.  The patient had recent creatinine check at the end of December that came back elevated at 1.28 mg/dL.  This was in the setting of no symptomology whatsoever.  The patient specifically denies any chest pain or breathing difficulties.  She has no nausea or vomiting.  She has no fever shakes or chills.  He is moving her bowels appropriately albeit with some intermittent constipation.    From an immunosuppression standpoint the patient is currently on cyclosporine, CellCept, and prednisone.  When she last saw her transplant nephrologist she was being considered for lowering the CellCept due to a history of skin cancer.  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  As such, no changes were made to her immunosuppression.    Drinks 8 glasses of 8 ounces for total of 64 ounces.    Needs pth and vitamin d checks.     No cp, sob, no n/v, no f/s/c. + constipation - metamucil     Stable known DSA.    Recent Hospitalizations:  [x] No [] Yes    New Medical Issues: [x] No [] Yes    Decreased energy: [x] No [] Yes    Chest pain or SOB with exertion:  [x] No [] Yes    Appetite change or weight change: [x] No [] Yes    Nausea, vomiting or diarrhea:  [x] No [] Yes    Fever, sweats or chills: [x] No [] Yes    Leg swelling: [x] No [] Yes      Home BP: at goal    Review of Systems   A comprehensive review of systems was obtained and negative, except as noted in the HPI or  PMH.    Problem List   Patient Active Problem List   Diagnosis     Carotid artery stenosis, asymptomatic     -donor kidney transplant     Polycystic kidney disease     S/P hip replacement     Renal hypertension     Social History   Social History     Tobacco Use     Smoking status: Never Smoker     Smokeless tobacco: Never Used   Substance Use Topics     Alcohol use: Yes     Comment: occ wine     Drug use: No     Allergies   No Known Allergies    Medications   Current Outpatient Medications   Medication Sig     amLODIPine (NORVASC) 5 MG tablet Take 5 mg by mouth     cholecalciferol (VITAMIN D-1000 MAX ST) 1000 units TABS Take 1,000 Units by mouth At Bedtime     cycloSPORINE modified (GENERIC EQUIVALENT) 25 MG capsule TAKE 3 CAPSULES BY MOUTH EVERY MORNING AND 2 CAPSULES EVERY EVENING     Ezetimibe (ZETIA PO) Take  by mouth. 10mg once per day     famotidine (PEPCID) 20 MG tablet      Ferrous Sulfate 324 (65 Fe) MG TBEC Take 65 mg of iron by mouth     Furosemide (LASIX PO) Take  by mouth. 20mg 1 tab per day     METOPROLOL TARTRATE 25mg. Twice daily.      Multiple Vitamins-Minerals (MULTIVITAL PO) Take  by mouth.     mycophenolate (GENERIC EQUIVALENT) 250 MG capsule TAKE 2 CAPSULES BY MOUTH EVERY MORNING AND 1 CAPSULE EVERY EVENING     potassium chloride ER (K-DUR/KLOR-CON M) 20 MEQ CR tablet Take 20 mEq by mouth     predniSONE (DELTASONE) 5 MG tablet Take 1 tablet by mouth daily.     sulfamethoxazole-trimethoprim (BACTRIM) 400-80 MG per tablet Take 1 tablet by mouth daily     warfarin ANTICOAGULANT (COUMADIN) 2.5 MG tablet Take 2.5 mg daily or as directed by coag clinic. (changing tablet strength)     ASPIRIN 81 mg per day     No current facility-administered medications for this visit.      There are no discontinued medications.    Physical Exam   Vital Signs: /61   Pulse 65   Wt 49.5 kg (109 lb 3.2 oz)   SpO2 97%   BMI 18.74 kg/m       GENERAL APPEARANCE: alert and no distress  HENT: mouth without  ulcers or lesions  LYMPHATICS: no cervical or supraclavicular nodes  RESP: lungs clear to auscultation - no rales, rhonchi or wheezes  CV: regular rhythm, normal rate, no rub, no murmur  EDEMA: no LE edema bilaterally  ABDOMEN: soft, nondistended, nontender, bowel sounds normal  MS: extremities normal - no gross deformities noted, no evidence of inflammation in joints, no muscle tenderness  SKIN: no rash    Data     Renal Latest Ref Rng & Units 12/27/2019 11/6/2019 10/28/2019   Na 133 - 144 mmol/L - - -   Na (external) 135 - 145 mmol/L 134(L) 129(L) 125(L)   K 3.4 - 5.3 mmol/L - - -   K (external) 3.6 - 5.2 mmol/L 4.9 3.8 4.3   Cl 94 - 109 mmol/L - - -   Cl (external) 98 - 107 mmol/L 98 90(L) 88(L)   CO2 20 - 32 mmol/L - - -   CO2 (external) 22 - 29 mmol/L 25 26 24   BUN 7 - 30 mg/dL - - -   BUN (external) 6 - 21 mg/dL 20 21 21   Cr 0.52 - 1.04 mg/dL - - -   Cr (external) 0.59 - 1.04 mg/dL 1.28(H) 0.94 1.04   Glucose 70 - 99 mg/dL - - -   Glucose (external) 70 - 140 mg/dL 79 89 71   Ca  8.5 - 10.1 mg/dL - - -   Ca (external) 8.8 - 10.2 mg/dL 10.4(H) 9.9 9.7   Mg 1.6 - 2.3 mg/dL - - -     Bone Health Latest Ref Rng & Units 9/20/2011 8/12/2008 7/11/2006   Phos 2.5 - 4.5 mg/dL 4.1 3.5 3.0   PTHi 12 - 72 pg/mL 103(H) 58 -     Heme Latest Ref Rng & Units 12/27/2019 11/6/2019 10/28/2019   WBC 4.0 - 11.0 10e9/L - - -   WBC (external) 3.4 - 9.6 10(9)/L 6.7 5.9 5.3   Hgb 11.7 - 15.7 g/dL - - -   Hgb (external) 11.6 - 15.0 g/dL 12.3 11.4(L) 11.9   Plt 150 - 450 10e9/L - - -   Plt (external) 157 - 371 10(9)/L 248 282 310     Liver Latest Ref Rng & Units 9/20/2011   Albumin 3.3 - 4.9 g/dL 4.4        Iron studies Latest Ref Rng & Units 10/30/2018   Iron 35 - 180 ug/dL 41   Iron sat 15 - 46 % 10(L)               Early Post Transplant

## 2019-12-31 NOTE — TELEPHONE ENCOUNTER
----- Message from Kimberly Avalos RN sent at 12/31/2019 12:03 PM CST -----  Regarding: Creat today  Hi --     Meliza was seen in clinic today by Dr Burton. If Creat (which is still pending from today) is 1.3 or below, ok for patient to leave for [place] as scheduled.  If creat is above 1.3, patient needs a biopsy and a plan to hold coumadin. Patient was told all of this in clinic and will be expecting a call this afternoon when creat has resulted.      Thanks!  Naz

## 2019-12-31 NOTE — NURSING NOTE
Chief Complaint   Patient presents with     RECHECK     kidney tx     Blood pressure 129/61, pulse 65, weight 49.5 kg (109 lb 3.2 oz), SpO2 97 %.    Marely Corrigan/SKYLAR  December 31, 2019 11:41 AM

## 2020-01-01 ENCOUNTER — PRE VISIT (OUTPATIENT)
Dept: UROLOGY | Facility: CLINIC | Age: 85
End: 2020-01-01

## 2020-01-01 DIAGNOSIS — Z48.298 AFTERCARE FOLLOWING ORGAN TRANSPLANT: ICD-10-CM

## 2020-01-01 DIAGNOSIS — Z94.0 KIDNEY REPLACED BY TRANSPLANT: ICD-10-CM

## 2020-01-01 DIAGNOSIS — Z79.899 ENCOUNTER FOR LONG-TERM CURRENT USE OF MEDICATION: ICD-10-CM

## 2020-01-01 LAB
CYCLOSPORINE BLD LC/MS/MS-MCNC: 80 UG/L (ref 50–400)
TME LAST DOSE: NORMAL H

## 2020-01-01 PROCEDURE — 80158 DRUG ASSAY CYCLOSPORINE: CPT | Performed by: INTERNAL MEDICINE

## 2020-01-02 LAB — DEPRECATED CALCIDIOL+CALCIFEROL SERPL-MC: 51 UG/L (ref 20–75)

## 2020-02-03 DIAGNOSIS — Z48.298 AFTERCARE FOLLOWING ORGAN TRANSPLANT: ICD-10-CM

## 2020-02-03 PROCEDURE — 80158 DRUG ASSAY CYCLOSPORINE: CPT | Performed by: INTERNAL MEDICINE

## 2020-02-07 LAB
CYCLOSPORINE BLD LC/MS/MS-MCNC: 58 UG/L (ref 50–400)
TME LAST DOSE: NORMAL H

## 2020-02-24 ENCOUNTER — APPOINTMENT (RX ONLY)
Dept: URBAN - METROPOLITAN AREA CLINIC 149 | Facility: CLINIC | Age: 85
Setting detail: DERMATOLOGY
End: 2020-02-24

## 2020-02-24 DIAGNOSIS — L82.0 INFLAMED SEBORRHEIC KERATOSIS: ICD-10-CM

## 2020-02-24 DIAGNOSIS — D18.0 HEMANGIOMA: ICD-10-CM

## 2020-02-24 DIAGNOSIS — Z85.828 PERSONAL HISTORY OF OTHER MALIGNANT NEOPLASM OF SKIN: ICD-10-CM

## 2020-02-24 DIAGNOSIS — L57.0 ACTINIC KERATOSIS: ICD-10-CM

## 2020-02-24 DIAGNOSIS — D22 MELANOCYTIC NEVI: ICD-10-CM

## 2020-02-24 DIAGNOSIS — L81.4 OTHER MELANIN HYPERPIGMENTATION: ICD-10-CM

## 2020-02-24 DIAGNOSIS — L82.1 OTHER SEBORRHEIC KERATOSIS: ICD-10-CM

## 2020-02-24 PROBLEM — D18.01 HEMANGIOMA OF SKIN AND SUBCUTANEOUS TISSUE: Status: ACTIVE | Noted: 2020-02-24

## 2020-02-24 PROBLEM — D22.9 MELANOCYTIC NEVI, UNSPECIFIED: Status: ACTIVE | Noted: 2020-02-24

## 2020-02-24 PROBLEM — D48.5 NEOPLASM OF UNCERTAIN BEHAVIOR OF SKIN: Status: ACTIVE | Noted: 2020-02-24

## 2020-02-24 PROCEDURE — 17000 DESTRUCT PREMALG LESION: CPT | Mod: 59

## 2020-02-24 PROCEDURE — ? SUNSCREEN RECOMMENDATIONS

## 2020-02-24 PROCEDURE — 99214 OFFICE O/P EST MOD 30 MIN: CPT | Mod: 25

## 2020-02-24 PROCEDURE — ? COUNSELING

## 2020-02-24 PROCEDURE — 17110 DESTRUCTION B9 LES UP TO 14: CPT

## 2020-02-24 PROCEDURE — ? LIQUID NITROGEN

## 2020-02-24 PROCEDURE — 11102 TANGNTL BX SKIN SINGLE LES: CPT | Mod: 59

## 2020-02-24 PROCEDURE — 17003 DESTRUCT PREMALG LES 2-14: CPT | Mod: 59

## 2020-02-24 PROCEDURE — ? BIOPSY BY SHAVE METHOD

## 2020-02-24 ASSESSMENT — LOCATION ZONE DERM
LOCATION ZONE: LEG
LOCATION ZONE: HAND
LOCATION ZONE: EAR
LOCATION ZONE: NOSE
LOCATION ZONE: FACE
LOCATION ZONE: ARM

## 2020-02-24 ASSESSMENT — LOCATION SIMPLE DESCRIPTION DERM
LOCATION SIMPLE: RIGHT FOREHEAD
LOCATION SIMPLE: LEFT THIGH
LOCATION SIMPLE: LEFT PRETIBIAL REGION
LOCATION SIMPLE: RIGHT EAR
LOCATION SIMPLE: LEFT HAND
LOCATION SIMPLE: NOSE
LOCATION SIMPLE: INFERIOR FOREHEAD
LOCATION SIMPLE: RIGHT NOSE
LOCATION SIMPLE: LEFT FOREARM

## 2020-02-24 ASSESSMENT — LOCATION DETAILED DESCRIPTION DERM
LOCATION DETAILED: RIGHT INFERIOR FOREHEAD
LOCATION DETAILED: LEFT PROXIMAL PRETIBIAL REGION
LOCATION DETAILED: RIGHT INFERIOR HELIX
LOCATION DETAILED: RIGHT NASAL SIDEWALL
LOCATION DETAILED: NASAL SUPRATIP
LOCATION DETAILED: LEFT VENTRAL PROXIMAL FOREARM
LOCATION DETAILED: LEFT ANTERIOR PROXIMAL THIGH
LOCATION DETAILED: NASAL DORSUM
LOCATION DETAILED: INFERIOR MID FOREHEAD
LOCATION DETAILED: LEFT ULNAR DORSAL HAND
LOCATION DETAILED: RIGHT ANTERIOR EARLOBE

## 2020-02-24 NOTE — PROCEDURE: BIOPSY BY SHAVE METHOD
Detail Level: Detailed
Depth Of Biopsy: dermis
Was A Bandage Applied: Yes
Size Of Lesion In Cm: 0
Biopsy Type: H and E
Biopsy Method: 15 blade
Anesthesia Type: 1% lidocaine with epinephrine
Anesthesia Volume In Cc (Will Not Render If 0): 0.5
Hemostasis: Aluminum Chloride
Wound Care: Vaseline
Dressing: bandage
Destruction After The Procedure: No
Type Of Destruction Used: Curettage
Curettage Text: The wound bed was treated with curettage after the biopsy was performed.
Cryotherapy Text: The wound bed was treated with cryotherapy after the biopsy was performed.
Electrodesiccation Text: The wound bed was treated with electrodesiccation after the biopsy was performed.
Electrodesiccation And Curettage Text: The wound bed was treated with electrodesiccation and curettage after the biopsy was performed.
Silver Nitrate Text: The wound bed was treated with silver nitrate after the biopsy was performed.
Lab: 6
Lab Facility: 3
Consent: Written consent was obtained and risks were reviewed including but not limited to scarring, infection, bleeding, scabbing, incomplete removal, nerve damage and allergy to anesthesia.
Post-Care Instructions: I reviewed with the patient in detail post-care instructions. Patient is to keep the biopsy site dry overnight, and then apply bacitracin twice daily until healed. Patient may apply hydrogen peroxide soaks to remove any crusting.
Notification Instructions: Patient is instructed to keep follow up appointment to discuss biopsy results.
Billing Type: Third-Party Bill

## 2020-02-24 NOTE — PROCEDURE: LIQUID NITROGEN
Consent: The patient's consent was obtained including but not limited to risks of crusting, scabbing, blistering, scarring, darker or lighter pigmentary change, recurrence, incomplete removal and infection.
Duration Of Freeze Thaw-Cycle (Seconds): 3
Render Post-Care Instructions In Note?: no
Detail Level: Simple
Post-Care Instructions: I reviewed with the patient in detail post-care instructions. Patient is to wear sunprotection, and avoid picking at any of the treated lesions. Pt may apply Vaseline to crusted or scabbing areas.
Number Of Freeze-Thaw Cycles: 2 freeze-thaw cycles
Medical Necessity Information: It is in your best interest to select a reason for this procedure from the list below. All of these items fulfill various CMS LCD requirements except the new and changing color options.
Detail Level: Detailed
Medical Necessity Clause: This procedure was medically necessary because the lesions that were treated were: at r and risk for and/or subject to recurrent physical trauma as a result of lesion type and location with history of the same, bleeding and irritated.

## 2020-05-08 DIAGNOSIS — Z48.298 AFTERCARE FOLLOWING ORGAN TRANSPLANT: ICD-10-CM

## 2020-05-08 PROCEDURE — 80158 DRUG ASSAY CYCLOSPORINE: CPT | Performed by: INTERNAL MEDICINE

## 2020-05-17 LAB
CYCLOSPORINE BLD LC/MS/MS-MCNC: 123 UG/L (ref 50–400)
TME LAST DOSE: NORMAL H

## 2020-05-18 DIAGNOSIS — Z94.0 KIDNEY TRANSPLANTED: ICD-10-CM

## 2020-05-18 NOTE — TELEPHONE ENCOUNTER
Left message and sent [employer]Stream[employer] message to patient regarding:  Cyclosporine level 123 on 5/8, goal 50-75, dose 75/50 mg BID.     PLAN:   Please call patient and confirm this was an accurate 12-hour trough. Verify Cyclosporine dose 75/50 mg BID. Confirm no new medications or illness. Confirm no missed doses. If accurate trough and accurate dose, decrease Cyclosporine dose to 50 mg BID and repeat labs in 1-2 weeks.

## 2020-05-18 NOTE — TELEPHONE ENCOUNTER
ISSUE:   Cyclosporine level 123 on 5/8, goal 50-75, dose 75/50 mg BID.    PLAN:   Please call patient and confirm this was an accurate 12-hour trough. Verify Cyclosporine dose 75/50 mg BID. Confirm no new medications or illness. Confirm no missed doses. If accurate trough and accurate dose, decrease Cyclosporine dose to 50 mg BID and repeat labs in 1-2 weeks.    If CSA taken before lab draw, no dose change.    OUTCOME/LPN task:  Call with plan above.

## 2020-05-18 NOTE — LETTER
PHYSICIAN ORDERS      DATE & TIME ISSUED: May 19, 2020 4:12 PM  PATIENT NAME: Meliza Krishnamurthy   : 1935     [facility] MR# [if applicable]: [id number]     DIAGNOSIS:  Kidney transplant   ICD-10 CODE: Z94.0      Please complete the following labs in 2 weeks  Cyclosporine level     Any questions please call: [phone]    Please fax results to [phone].    .

## 2020-05-19 RX ORDER — CYCLOSPORINE 25 MG/1
50 CAPSULE, LIQUID FILLED ORAL 2 TIMES DAILY
Qty: 120 CAPSULE | Refills: 11 | Status: SHIPPED | OUTPATIENT
Start: 2020-05-19 | End: 2020-08-12

## 2020-05-19 NOTE — TELEPHONE ENCOUNTER
Call returned to patient. Patient confirms current dose and accurate trough level. Denies any recent illness, diarrhea or medication changes. Patient v\u to decrease dose to 50 mg bid and repeat level in 2 weeks. Order/rx sent

## 2020-06-10 PROCEDURE — 80158 DRUG ASSAY CYCLOSPORINE: CPT | Performed by: INTERNAL MEDICINE

## 2020-06-14 LAB
CYCLOSPORINE BLD LC/MS/MS-MCNC: 142 UG/L (ref 50–400)
TME LAST DOSE: NORMAL H

## 2020-06-15 ENCOUNTER — TELEPHONE (OUTPATIENT)
Dept: TRANSPLANT | Facility: CLINIC | Age: 85
End: 2020-06-15

## 2020-06-15 NOTE — LETTER
PHYSICIAN ORDERS      DATE & TIME ISSUED: 2020 1:00 PM  PATIENT NAME: Meliza Krishnamurthy   : 1935     [facility] MR# [if applicable]: [id number]     DIAGNOSIS:  Kidney transplant   ICD-10 CODE: Z94.0      Please complete the following level in one week  Cyclosporine level    Any questions please call: [phone] option 5    Please fax results to [phone].

## 2020-06-15 NOTE — TELEPHONE ENCOUNTER
ISSUE  , goal 50-75    PLAN:  Call and confirm a 12 hour trough and current CSA dose of 50 mg BID  Any recent illness, diarrhea, or medication changes?  If an accurate trough, recommend decreasing dose to 25/50 and repeat level in 1 week     OUTCOME:  Called and left v/m for patient to return call to sot office    LPN task:  Please call her with above plan.  Please also confirm which lab this is using - we are missing other labs. Thanks!

## 2020-06-17 NOTE — TELEPHONE ENCOUNTER
Call placed to patient. Patient confirms current dose however unsure of trough level. Denies any recent illness, diarrhea or medication changes. Patient v\u to repeat level in one week. Order sent.

## 2020-07-01 ENCOUNTER — TELEPHONE (OUTPATIENT)
Dept: TRANSPLANT | Facility: CLINIC | Age: 85
End: 2020-07-01

## 2020-07-01 NOTE — LETTER
PHYSICIAN ORDERS      DATE & TIME ISSUED: 2020 3:41 PM  PATIENT NAME: Meliza Krishnamurthy   : 1935     [facility] MR# [if applicable]: [id number]     DIAGNOSIS:  Kidney transplant   ICD-10 CODE: Z94.0      Please complete the following labs in July  BMP  Cyclosporine level (ensure 12 hours between last dose and blood draw)    Any questions please call: [phone] option 5    Please fax results to [phone].

## 2020-07-01 NOTE — TELEPHONE ENCOUNTER
Call placed to patient. Patient  Confirm current dose h\e unsure of accurate trough level. Patient v\u to repeat labs in July. Order sent

## 2020-07-01 NOTE — TELEPHONE ENCOUNTER
Issue:  Repeat .    Plan/LPN task:  Please call Meliza Krishnamurthy to confirm timing of lab draw.  Please confirm current CSA dose.    No dose change. Please repeat BMP and CSA in July with 12 hour trough.  Send lab order.

## 2020-08-06 PROCEDURE — 80158 DRUG ASSAY CYCLOSPORINE: CPT | Performed by: INTERNAL MEDICINE

## 2020-08-11 ENCOUNTER — TELEPHONE (OUTPATIENT)
Dept: TRANSPLANT | Facility: CLINIC | Age: 85
End: 2020-08-11

## 2020-08-11 DIAGNOSIS — Z79.899 ENCOUNTER FOR LONG-TERM CURRENT USE OF MEDICATION: ICD-10-CM

## 2020-08-11 DIAGNOSIS — Z94.0 KIDNEY REPLACED BY TRANSPLANT: Primary | ICD-10-CM

## 2020-08-11 DIAGNOSIS — Z94.0 KIDNEY TRANSPLANTED: ICD-10-CM

## 2020-08-11 DIAGNOSIS — Z48.298 AFTERCARE FOLLOWING ORGAN TRANSPLANT: ICD-10-CM

## 2020-08-11 LAB
CYCLOSPORINE BLD LC/MS/MS-MCNC: 133 UG/L (ref 50–400)
TME LAST DOSE: NORMAL H

## 2020-08-11 NOTE — LETTER
PHYSICIAN ORDERS      DATE & TIME ISSUED: 2020 6:01 PM  PATIENT NAME: Meliza Krishnamurthy   : 1935     [facility] MR# [if applicable]: [id number]     DIAGNOSIS:  Kidney transplant   ICD-10 CODE: Z94.0     Please complete the following labs in 1-2 weeks  Cyclosporine Level (ensure 12 hours between last dose and blood draw)  BMP    Any questions please call: [phone] option 5    Please fax results to [phone].

## 2020-08-11 NOTE — TELEPHONE ENCOUNTER
ISSUE:   Cyclosporine level 133 on 8/6/2020, goal 50-75, dose 50 mg BID.    PLAN:   Please call patient and confirm this was an accurate 12-hour trough. Verify Cyclosporine dose 50 mg BID. Confirm no new medications or illness. Confirm no missed doses. If accurate trough and accurate dose, decrease Cyclosporine dose to 25 mg BID and repeat labs in 1-2 weeks    Julisa Cabrera DNP, RN, [facility]      OUTCOME:   Spoke with patient, they confirm accurate trough level and current dose 50 mg BID. Patient confirmed dose change to 25 mg BID and to repeat labs in 1-2 weeks. Orders sent to preferred pharmacy for dose change and lab for repeat labs. Patient voiced understanding of plan.     Gurpreet Rodriguez LPN

## 2020-08-12 RX ORDER — CYCLOSPORINE 25 MG/1
25 CAPSULE, LIQUID FILLED ORAL 2 TIMES DAILY
Qty: 60 CAPSULE | Refills: 11 | Status: SHIPPED | OUTPATIENT
Start: 2020-08-12 | End: 2021-01-01

## 2020-08-16 ENCOUNTER — TELEPHONE (OUTPATIENT)
Dept: TRANSPLANT | Facility: CLINIC | Age: 85
End: 2020-08-16

## 2020-08-16 NOTE — TELEPHONE ENCOUNTER
Received call from Dr. Parker at [facility]. Pt is COVID positive, admitted yesterday. She has increasing transaminase and wondering if CSA level is too high. It will not result until tomorrow as they have to send it to [place]. O2 needs have increased to 4L this morning, her BP is low ~70/50. If she stabilizes will be looking to transfer pt up to [facility] or [place]. Discussed calling the main  line back to arrange direct admission/transfer if they feel she is stable later today.

## 2020-08-17 NOTE — TELEPHONE ENCOUNTER
No notes yet from today in Care Everywhere. Call placed to Dr. Parker for update. He reports she was transferred to [place] on 8/16. Meliza still on 4L NC at time of transfer, hypotensive, blood cultures neg in [place]. No reports yet in Care Everywhere.

## 2020-08-17 NOTE — TELEPHONE ENCOUNTER
Reviewed with Dr. Johnson. He will recommend decreasing mycophenolate from 250 mg/500 mg bid to 250 mg bid. Call placed to [place], spoke to charge RN, left  for managing internist. Left phone number for questions.

## 2020-08-18 ENCOUNTER — EXTERNAL HOSPITAL ADMISSION (OUTPATIENT)
Dept: NEPHROLOGY | Facility: CLINIC | Age: 85
End: 2020-08-18

## 2020-08-18 ENCOUNTER — TELEPHONE (OUTPATIENT)
Dept: NEPHROLOGY | Facility: CLINIC | Age: 85
End: 2020-08-18

## 2020-08-18 NOTE — TELEPHONE ENCOUNTER
I spoke with the hospitalist at [facility] regarding Meliza Krishnamurthy.  She is a delightful 84-year-old lady was admitted with COVID 19 infection.  She is currently requiring oxygen about 2 L but hemodynamically stable.  Her blood cultures grew gram-negative and currently being covered with antibiotics.  She is receiving dexamethasone as part of her COVID treatment and therefore I recommended to hold CellCept until the last day of the dexamethasone at which point CellCept should be resumed at 250 mg twice a day especially if the patient is better otherwise we will expect a call to revisit her immunosuppression.  In the meantime she will continue with her cyclosporine and prednisone 5 mg.  I made myself available for further discussions and immunosuppression management for the hospitalist team at [facility].  I spent a total of 10 minutes in phone consultation and 10 additional minutes in chart reviews.

## 2020-08-22 ENCOUNTER — AMBULATORY - HEALTHEAST (OUTPATIENT)
Dept: FAMILY MEDICINE | Facility: CLINIC | Age: 85
End: 2020-08-22

## 2020-08-24 ENCOUNTER — TELEPHONE (OUTPATIENT)
Dept: TRANSPLANT | Facility: CLINIC | Age: 85
End: 2020-08-24

## 2020-08-24 NOTE — LETTER
PHYSICIAN ORDERS      DATE & TIME ISSUED: 2020 7:51 AM  PATIENT NAME: Meliza Krishnamurthy   : 1935     [facility] MR# [if applicable]: [id number]     DIAGNOSIS:  Kidney Transplant  ICD-10 CODE: Z94.0     Please complete the following labs:  BMP  CBC  Cyclosporine drug level  COVID PCR      Any questions please call: [phone].      Dr. Neeraj Johnson

## 2020-08-24 NOTE — TELEPHONE ENCOUNTER
Discharged from [place] 8/21. Call placed to check in. Meliza Krishnamurthy reports she feels fine but is very dizzy. She has not checked BP at home. She is home alone. RNCC recommended she seek medical attention ASAP for dizziness. Meliza declined. She says she has family and friends stopping at home. Her daughter is setting up medications - Meliza reports she is taking PTA medications.  She has INR scheduled tomorrow at [facility]. Will send lab order for repeat tx labs. Meliza is agreeable to repeat COVID PCR. Will send lab order for this as well.    LPN task:  Please send lab order for repeat BMP, CBC and CSA.  Please also send order for COVID PCR to [facility] in [place].

## 2020-08-26 ENCOUNTER — TELEPHONE (OUTPATIENT)
Dept: TRANSPLANT | Facility: CLINIC | Age: 85
End: 2020-08-26

## 2020-08-26 NOTE — TELEPHONE ENCOUNTER
Reviewed in COVID meeting:    [place] results in Care Everywhere-    Negative rapid COVID test 8/25.  Cr at baseline. Meliza on baseline immunosuppression. No longer needed to review in COVID meeting.   Need CSA, WBC elevated at 11.     Call placed to Meliza Krishnamurthy to check in.   Meliza denies fevers - she is still on abx from discharge.  Denies shortness of breath.  BP/pulse - 142/74, pulse 70.     Meliza still reports dizziness. RNCC let her know she needs to follow up with PCP ASAP to prevent falls. Meliza v/u. She will have repeat INR this week. Will send another order for CSA to be drawn this week. Meliza v/u and will bring  kit to lab this week. Will also check CBC to reassess leukocytes and BMP to check Cr.    LPN task:  Please send lab order to repeat BMP, CBC and CSA this week, send to [facility].

## 2020-08-26 NOTE — LETTER
PHYSICIAN ORDERS      DATE & TIME ISSUED: 2020 7:25 AM  PATIENT NAME: Meliza Krishnamurthy   : 1935     [facility] MR# [if applicable]: [id number]     DIAGNOSIS:  Kidney Transplant  ICD-10 CODE: Z94.0     Please complete the following labs within the next week:  BMP  CBC  Cyclosporine drug level     Any questions please call: [phone]  Please fax lab results to [phone].      Dr. Neeraj Johnson

## 2020-08-27 NOTE — TELEPHONE ENCOUNTER
Current lab orders faxed to patients local lab and sent copy to patient via [employer].

## 2020-08-28 ENCOUNTER — TELEPHONE (OUTPATIENT)
Dept: TRANSPLANT | Facility: CLINIC | Age: 85
End: 2020-08-28

## 2020-08-28 NOTE — TELEPHONE ENCOUNTER
Patient Call: Voicemail  Date/Time: 8/28/20 342pm  Reason for call: Patient left a message requesting a call back. She went for labs today as instructed and they did not have our orders so she did not have drawn, but patient stated she did have labs Tuesday this week. Would like a call back to instruct her on when to get labs

## 2020-08-28 NOTE — LETTER
PHYSICIAN ORDERS      DATE & TIME ISSUED: 2020 10:32 AM  PATIENT NAME: Meliza Krishnamurthy   : 1935     [facility] MR# [if applicable]: [id number]     DIAGNOSIS:  Kidney Transplant  ICD-10 CODE: Z94.0     Please repeat the following labs in 1-2 weeks:  Cyclosporine drug level  CBC  BMP    Any questions please call: [phone]  Please fax lab results to [phone].      Dr. Neeraj Johnson

## 2020-08-31 ENCOUNTER — COMMUNICATION - HEALTHEAST (OUTPATIENT)
Dept: FAMILY MEDICINE | Facility: CLINIC | Age: 85
End: 2020-08-31

## 2020-08-31 NOTE — TELEPHONE ENCOUNTER
Current lab orders faxed to patients local lab and sent copy to patient via [facility].

## 2020-08-31 NOTE — TELEPHONE ENCOUNTER
Patient due for repeat labs and most importantly 12 hour CSA.    LPN task:  Please have Meliza Krishnamurthy repeat labs and 12 hour CSA within 1-2 weeks. Send lab order to preferred lab.

## 2020-09-07 ENCOUNTER — COMMUNICATION - HEALTHEAST (OUTPATIENT)
Dept: FAMILY MEDICINE | Facility: CLINIC | Age: 85
End: 2020-09-07

## 2020-09-07 ENCOUNTER — TRANSFERRED RECORDS (OUTPATIENT)
Dept: HEALTH INFORMATION MANAGEMENT | Facility: CLINIC | Age: 85
End: 2020-09-07

## 2020-09-07 LAB
ALT SERPL-CCNC: 32 U/L (ref 7–45)
AST SERPL-CCNC: 29 U/L (ref 8–43)
CREAT SERPL-MCNC: 1.02 MG/DL (ref 0.59–1.04)
GFR SERPL CREATININE-BSD FRML MDRD: 51 ML/MIN/BSA
GLUCOSE SERPL-MCNC: 130 MG/DL (ref 70–140)
INR PPP: 1.6 (ref 0.9–1.1)
POTASSIUM SERPL-SCNC: 4 MMOL/L (ref 3.6–5.2)

## 2020-09-07 NOTE — PROGRESS NOTES
[facility]  Transfer Triage Note    Date of call: 09/07/20  Time of call: 6:15 PM    Is pandemic COVID-19 a concern? Yes (lower suspicion)    Reason for transfer: Patient has established care here   Diagnosis: Febrile illness.  S/P renal transplant.  Recent [place] admit for COVID associated pneumonia.     Outside Records: Not available  Additional records requested to be faxed to [phone].    Stability of Patient: Patient is vitally stable, with no critical labs, and will likely remain stable throughout the transfer process  ICU: No    Expected Time of Arrival for Transfer: 0-8 hours    Arrival Location:  [address] [address] Phone: [phone]    Recommendations for Management and Stabilization: Given     Additional Comments  84 y.o. female with history of polycystic kidney disease, renal transplant in 1998 followed at [facility], chronic immunosuppression (cyclosporine, mycophenolate, and prednisone), chronic pneumocystis prophylaxis with Bactrim, systolic (ejection fraction 49%) and diastolic congestive heart failure, carotid artery stenosis, cerebral artery aneurysm with surgical repair, atrial fibrillation (on amiodarone, metoprolol, and Coumadin), hypertension and dyslipidemia referred presently from [facility] ED in [place] for febrile illness in association with active urinary sediment.    Admitted to [facility] from [facility] on 08/16/20 for further management of confirmed COVID-19 infection involving  Viral Pneumonia due to COVID-19 and Acute Hypoxic Respiratory Failure.  Bilateral ground glass infiltrates on CT.  By the end of hospitalization patient's oxygen requirements improved, she was transitioned to room air, was saturating high 90s.  Received dexamethasone and remdisivir.  Notified by [facility] of gram-negative blood culture in 1/3  from 08/16.  Impression by ID of  "Klebsiella pneumonia bacteremia, unclear whether a contaminant.  Merrem was started on 08/18, switched to IV Zosyn on 08/20, cefdinir 300 mg PO BID on discharge with end date 08/31/2020.    Now presents with SOB and fever to 105 degrees (40.6 C).  /99   HR 90s  O2 sat 92% RA.  \"Sore\" LLE with surrounding erythema.  After tylenol and IV fluids defervesced.  WBC 8,000.  Hgb 10.9  LA 1.9  Cr 1.02 (0.88).  Na 131  LFTs normal.  BC's drawn.  CXR - scoliosis without infiltrate.  Left leg imaging neg for osteo.  UA  WBCs Large leukocyte esterase.  Presence of bacteria. No squamous epithelial cells.  Neg nitrite.  No abdominal or flank pain.  No clear voiding c/o's.  Recheck VS /55  HR 69  95% RA.   ED concern regarding possible UTI.  Desire to transfer to [facility] where patient had renal transplant pending bed availability.  Planned med/surg bed with telemetry.  Neg COVID swab 8/25.  Repeat COVID swab obtained.  Basis for fever unclear.  Recommend broad spectrum AB coverage with IV Zosyn with prior Klebsiella pneumonia  bacteremia (above) after cultures. Being admitted as a PUI with recent COVID history.   Added INR, CRP to labs.  (Unable to do procal).     Anjum Jaffe MD      "

## 2020-09-08 ENCOUNTER — HOSPITAL ENCOUNTER (INPATIENT)
Facility: CLINIC | Age: 85
LOS: 4 days | Discharge: NURSING, REHAB OR LONG-TERM CARE | DRG: 698 | End: 2020-09-12
Attending: INTERNAL MEDICINE | Admitting: STUDENT IN AN ORGANIZED HEALTH CARE EDUCATION/TRAINING PROGRAM
Payer: MEDICARE

## 2020-09-08 ENCOUNTER — APPOINTMENT (OUTPATIENT)
Dept: GENERAL RADIOLOGY | Facility: CLINIC | Age: 85
DRG: 698 | End: 2020-09-08
Attending: STUDENT IN AN ORGANIZED HEALTH CARE EDUCATION/TRAINING PROGRAM
Payer: MEDICARE

## 2020-09-08 DIAGNOSIS — N10 ACUTE PYELONEPHRITIS: ICD-10-CM

## 2020-09-08 DIAGNOSIS — R33.9 URINARY RETENTION: ICD-10-CM

## 2020-09-08 DIAGNOSIS — K59.00 CONSTIPATION, UNSPECIFIED CONSTIPATION TYPE: Primary | ICD-10-CM

## 2020-09-08 PROBLEM — N39.0 UTI (URINARY TRACT INFECTION): Status: ACTIVE | Noted: 2020-09-08

## 2020-09-08 LAB
ALBUMIN UR-MCNC: 30 MG/DL
ANION GAP SERPL CALCULATED.3IONS-SCNC: 7 MMOL/L (ref 3–14)
APPEARANCE UR: ABNORMAL
BACTERIA #/AREA URNS HPF: ABNORMAL /HPF
BILIRUB UR QL STRIP: NEGATIVE
BUN SERPL-MCNC: 16 MG/DL (ref 7–30)
CALCIUM SERPL-MCNC: 8.7 MG/DL (ref 8.5–10.1)
CHLORIDE SERPL-SCNC: 102 MMOL/L (ref 94–109)
CO2 SERPL-SCNC: 24 MMOL/L (ref 20–32)
COLOR UR AUTO: YELLOW
CREAT SERPL-MCNC: 0.83 MG/DL (ref 0.52–1.04)
ERYTHROCYTE [DISTWIDTH] IN BLOOD BY AUTOMATED COUNT: 17.6 % (ref 10–15)
GFR SERPL CREATININE-BSD FRML MDRD: 64 ML/MIN/{1.73_M2}
GLUCOSE SERPL-MCNC: 80 MG/DL (ref 70–99)
GLUCOSE UR STRIP-MCNC: NEGATIVE MG/DL
HCT VFR BLD AUTO: 36.2 % (ref 35–47)
HGB BLD-MCNC: 11.3 G/DL (ref 11.7–15.7)
HGB UR QL STRIP: ABNORMAL
HYALINE CASTS #/AREA URNS LPF: 3 /LPF (ref 0–2)
INR PPP: 1.53 (ref 0.86–1.14)
INTERPRETATION ECG - MUSE: NORMAL
KETONES UR STRIP-MCNC: NEGATIVE MG/DL
LEUKOCYTE ESTERASE UR QL STRIP: ABNORMAL
MCH RBC QN AUTO: 29.5 PG (ref 26.5–33)
MCHC RBC AUTO-ENTMCNC: 31.2 G/DL (ref 31.5–36.5)
MCV RBC AUTO: 95 FL (ref 78–100)
MUCOUS THREADS #/AREA URNS LPF: PRESENT /LPF
NITRATE UR QL: NEGATIVE
PH UR STRIP: 6.5 PH (ref 5–7)
PLATELET # BLD AUTO: 153 10E9/L (ref 150–450)
POTASSIUM SERPL-SCNC: 3.9 MMOL/L (ref 3.4–5.3)
RBC # BLD AUTO: 3.83 10E12/L (ref 3.8–5.2)
RBC #/AREA URNS AUTO: 22 /HPF (ref 0–2)
SODIUM SERPL-SCNC: 134 MMOL/L (ref 133–144)
SOURCE: ABNORMAL
SP GR UR STRIP: 1.01 (ref 1–1.03)
SQUAMOUS #/AREA URNS AUTO: <1 /HPF (ref 0–1)
UROBILINOGEN UR STRIP-MCNC: NORMAL MG/DL (ref 0–2)
WBC # BLD AUTO: 8.1 10E9/L (ref 4–11)
WBC #/AREA URNS AUTO: >182 /HPF (ref 0–5)

## 2020-09-08 PROCEDURE — 87086 URINE CULTURE/COLONY COUNT: CPT | Performed by: HOSPITALIST

## 2020-09-08 PROCEDURE — 85610 PROTHROMBIN TIME: CPT | Performed by: NURSE PRACTITIONER

## 2020-09-08 PROCEDURE — 36415 COLL VENOUS BLD VENIPUNCTURE: CPT | Performed by: STUDENT IN AN ORGANIZED HEALTH CARE EDUCATION/TRAINING PROGRAM

## 2020-09-08 PROCEDURE — 25000128 H RX IP 250 OP 636: Performed by: NURSE PRACTITIONER

## 2020-09-08 PROCEDURE — 85027 COMPLETE CBC AUTOMATED: CPT | Performed by: NURSE PRACTITIONER

## 2020-09-08 PROCEDURE — 87186 SC STD MICRODIL/AGAR DIL: CPT | Performed by: HOSPITALIST

## 2020-09-08 PROCEDURE — 93005 ELECTROCARDIOGRAM TRACING: CPT

## 2020-09-08 PROCEDURE — 93010 ELECTROCARDIOGRAM REPORT: CPT | Performed by: INTERNAL MEDICINE

## 2020-09-08 PROCEDURE — 99207 ZZC APP CREDIT; MD BILLING SHARED VISIT: CPT | Performed by: NURSE PRACTITIONER

## 2020-09-08 PROCEDURE — 99207 ZZC CDG-HISTORY COMP: MEETS EXP. PROBLEM FOCUSED - DOWN CODED LACK OF HPI: CPT | Performed by: STUDENT IN AN ORGANIZED HEALTH CARE EDUCATION/TRAINING PROGRAM

## 2020-09-08 PROCEDURE — 71045 X-RAY EXAM CHEST 1 VIEW: CPT

## 2020-09-08 PROCEDURE — 25000128 H RX IP 250 OP 636: Performed by: STUDENT IN AN ORGANIZED HEALTH CARE EDUCATION/TRAINING PROGRAM

## 2020-09-08 PROCEDURE — 87088 URINE BACTERIA CULTURE: CPT | Performed by: HOSPITALIST

## 2020-09-08 PROCEDURE — 36415 COLL VENOUS BLD VENIPUNCTURE: CPT | Performed by: NURSE PRACTITIONER

## 2020-09-08 PROCEDURE — 25000132 ZZH RX MED GY IP 250 OP 250 PS 637: Mod: GY | Performed by: NURSE PRACTITIONER

## 2020-09-08 PROCEDURE — 87181 SC STD AGAR DILUTION PER AGT: CPT | Performed by: HOSPITALIST

## 2020-09-08 PROCEDURE — 99233 SBSQ HOSP IP/OBS HIGH 50: CPT | Performed by: STUDENT IN AN ORGANIZED HEALTH CARE EDUCATION/TRAINING PROGRAM

## 2020-09-08 PROCEDURE — 81001 URINALYSIS AUTO W/SCOPE: CPT | Performed by: NURSE PRACTITIONER

## 2020-09-08 PROCEDURE — 87040 BLOOD CULTURE FOR BACTERIA: CPT | Performed by: STUDENT IN AN ORGANIZED HEALTH CARE EDUCATION/TRAINING PROGRAM

## 2020-09-08 PROCEDURE — 80048 BASIC METABOLIC PNL TOTAL CA: CPT | Performed by: NURSE PRACTITIONER

## 2020-09-08 PROCEDURE — 40000141 ZZH STATISTIC PERIPHERAL IV START W/O US GUIDANCE

## 2020-09-08 PROCEDURE — 25000131 ZZH RX MED GY IP 250 OP 636 PS 637: Mod: GY | Performed by: NURSE PRACTITIONER

## 2020-09-08 PROCEDURE — 12000001 ZZH R&B MED SURG/OB UMMC

## 2020-09-08 PROCEDURE — 25000132 ZZH RX MED GY IP 250 OP 250 PS 637: Mod: GY | Performed by: STUDENT IN AN ORGANIZED HEALTH CARE EDUCATION/TRAINING PROGRAM

## 2020-09-08 RX ORDER — WARFARIN SODIUM 2.5 MG/1
2.5 TABLET ORAL ONCE
Status: COMPLETED | OUTPATIENT
Start: 2020-09-08 | End: 2020-09-08

## 2020-09-08 RX ORDER — ONDANSETRON 4 MG/1
4 TABLET, ORALLY DISINTEGRATING ORAL EVERY 6 HOURS PRN
Status: DISCONTINUED | OUTPATIENT
Start: 2020-09-08 | End: 2020-09-12 | Stop reason: HOSPADM

## 2020-09-08 RX ORDER — ASPIRIN 81 MG/1
81 TABLET ORAL DAILY
Status: ON HOLD | COMMUNITY
End: 2020-09-08

## 2020-09-08 RX ORDER — VITAMIN B COMPLEX
1000 TABLET ORAL AT BEDTIME
Status: DISCONTINUED | OUTPATIENT
Start: 2020-09-08 | End: 2020-09-12 | Stop reason: HOSPADM

## 2020-09-08 RX ORDER — MYCOPHENOLATE MOFETIL 250 MG/1
250 CAPSULE ORAL
Status: DISCONTINUED | OUTPATIENT
Start: 2020-09-08 | End: 2020-09-12 | Stop reason: HOSPADM

## 2020-09-08 RX ORDER — AMIODARONE HYDROCHLORIDE 200 MG/1
200 TABLET ORAL DAILY
COMMUNITY

## 2020-09-08 RX ORDER — CYCLOSPORINE 25 MG/1
25 CAPSULE, LIQUID FILLED ORAL 2 TIMES DAILY
Status: DISCONTINUED | OUTPATIENT
Start: 2020-09-08 | End: 2020-09-12 | Stop reason: HOSPADM

## 2020-09-08 RX ORDER — FERROUS SULFATE 325(65) MG
324 TABLET ORAL DAILY
Status: DISCONTINUED | OUTPATIENT
Start: 2020-09-08 | End: 2020-09-12 | Stop reason: HOSPADM

## 2020-09-08 RX ORDER — ERTAPENEM 1 G/1
1 INJECTION, POWDER, LYOPHILIZED, FOR SOLUTION INTRAMUSCULAR; INTRAVENOUS EVERY 24 HOURS
Status: DISCONTINUED | OUTPATIENT
Start: 2020-09-08 | End: 2020-09-09

## 2020-09-08 RX ORDER — FAMOTIDINE 20 MG/1
20 TABLET, FILM COATED ORAL DAILY
Status: DISCONTINUED | OUTPATIENT
Start: 2020-09-08 | End: 2020-09-12 | Stop reason: HOSPADM

## 2020-09-08 RX ORDER — POTASSIUM CHLORIDE 750 MG/1
20 TABLET, EXTENDED RELEASE ORAL ONCE
Status: COMPLETED | OUTPATIENT
Start: 2020-09-08 | End: 2020-09-08

## 2020-09-08 RX ORDER — ACETAMINOPHEN 325 MG/1
650 TABLET ORAL EVERY 4 HOURS PRN
Status: DISCONTINUED | OUTPATIENT
Start: 2020-09-08 | End: 2020-09-12 | Stop reason: HOSPADM

## 2020-09-08 RX ORDER — METOPROLOL TARTRATE 25 MG/1
12.5 TABLET, FILM COATED ORAL 2 TIMES DAILY
COMMUNITY

## 2020-09-08 RX ORDER — MYCOPHENOLATE MOFETIL 250 MG/1
500 CAPSULE ORAL EVERY MORNING
Status: DISCONTINUED | OUTPATIENT
Start: 2020-09-08 | End: 2020-09-08

## 2020-09-08 RX ORDER — AMLODIPINE BESYLATE 5 MG/1
5 TABLET ORAL
Status: DISCONTINUED | OUTPATIENT
Start: 2020-09-08 | End: 2020-09-08

## 2020-09-08 RX ORDER — FUROSEMIDE 40 MG
40 TABLET ORAL DAILY
Status: DISCONTINUED | OUTPATIENT
Start: 2020-09-08 | End: 2020-09-12 | Stop reason: HOSPADM

## 2020-09-08 RX ORDER — OXYCODONE HYDROCHLORIDE 5 MG/1
5 TABLET ORAL
Status: DISCONTINUED | OUTPATIENT
Start: 2020-09-08 | End: 2020-09-12 | Stop reason: HOSPADM

## 2020-09-08 RX ORDER — MYCOPHENOLATE MOFETIL 250 MG/1
250 CAPSULE ORAL AT BEDTIME
Status: DISCONTINUED | OUTPATIENT
Start: 2020-09-08 | End: 2020-09-08

## 2020-09-08 RX ORDER — ASPIRIN 81 MG/1
81 TABLET, CHEWABLE ORAL DAILY
Status: DISCONTINUED | OUTPATIENT
Start: 2020-09-08 | End: 2020-09-09

## 2020-09-08 RX ORDER — PREDNISONE 5 MG/1
5 TABLET ORAL DAILY
Status: DISCONTINUED | OUTPATIENT
Start: 2020-09-08 | End: 2020-09-12 | Stop reason: HOSPADM

## 2020-09-08 RX ORDER — NALOXONE HYDROCHLORIDE 0.4 MG/ML
.1-.4 INJECTION, SOLUTION INTRAMUSCULAR; INTRAVENOUS; SUBCUTANEOUS
Status: DISCONTINUED | OUTPATIENT
Start: 2020-09-08 | End: 2020-09-12 | Stop reason: HOSPADM

## 2020-09-08 RX ORDER — SULFAMETHOXAZOLE AND TRIMETHOPRIM 400; 80 MG/1; MG/1
1 TABLET ORAL DAILY
Status: DISCONTINUED | OUTPATIENT
Start: 2020-09-08 | End: 2020-09-12 | Stop reason: HOSPADM

## 2020-09-08 RX ORDER — PRAVASTATIN SODIUM 10 MG
10 TABLET ORAL DAILY
Status: DISCONTINUED | OUTPATIENT
Start: 2020-09-08 | End: 2020-09-09

## 2020-09-08 RX ORDER — PIPERACILLIN SODIUM, TAZOBACTAM SODIUM 2; .25 G/10ML; G/10ML
2.25 INJECTION, POWDER, LYOPHILIZED, FOR SOLUTION INTRAVENOUS EVERY 6 HOURS
Status: DISCONTINUED | OUTPATIENT
Start: 2020-09-08 | End: 2020-09-08

## 2020-09-08 RX ORDER — POTASSIUM CHLORIDE 750 MG/1
20 TABLET, EXTENDED RELEASE ORAL DAILY
Status: DISCONTINUED | OUTPATIENT
Start: 2020-09-09 | End: 2020-09-12 | Stop reason: HOSPADM

## 2020-09-08 RX ORDER — FUROSEMIDE 20 MG
20 TABLET ORAL DAILY
Status: DISCONTINUED | OUTPATIENT
Start: 2020-09-08 | End: 2020-09-08

## 2020-09-08 RX ORDER — LIDOCAINE 40 MG/G
CREAM TOPICAL
Status: DISCONTINUED | OUTPATIENT
Start: 2020-09-08 | End: 2020-09-12 | Stop reason: HOSPADM

## 2020-09-08 RX ORDER — VANCOMYCIN HYDROCHLORIDE 1 G/200ML
1000 INJECTION, SOLUTION INTRAVENOUS EVERY 24 HOURS
Status: DISCONTINUED | OUTPATIENT
Start: 2020-09-08 | End: 2020-09-11

## 2020-09-08 RX ORDER — ONDANSETRON 2 MG/ML
4 INJECTION INTRAMUSCULAR; INTRAVENOUS EVERY 6 HOURS PRN
Status: DISCONTINUED | OUTPATIENT
Start: 2020-09-08 | End: 2020-09-12 | Stop reason: HOSPADM

## 2020-09-08 RX ADMIN — OXYCODONE HYDROCHLORIDE 5 MG: 5 TABLET ORAL at 04:33

## 2020-09-08 RX ADMIN — VANCOMYCIN HYDROCHLORIDE 1000 MG: 1 INJECTION, SOLUTION INTRAVENOUS at 20:14

## 2020-09-08 RX ADMIN — Medication 12.5 MG: at 08:09

## 2020-09-08 RX ADMIN — Medication 12.5 MG: at 20:10

## 2020-09-08 RX ADMIN — MYCOPHENOLATE MOFETIL 250 MG: 250 CAPSULE ORAL at 04:33

## 2020-09-08 RX ADMIN — ACETAMINOPHEN 650 MG: 325 TABLET, FILM COATED ORAL at 04:33

## 2020-09-08 RX ADMIN — FERROUS SULFATE TAB 325 MG (65 MG ELEMENTAL FE) 324 MG: 325 (65 FE) TAB at 08:09

## 2020-09-08 RX ADMIN — ASPIRIN 81 MG CHEWABLE TABLET 81 MG: 81 TABLET CHEWABLE at 08:09

## 2020-09-08 RX ADMIN — SULFAMETHOXAZOLE AND TRIMETHOPRIM 1 TABLET: 400; 80 TABLET ORAL at 08:09

## 2020-09-08 RX ADMIN — MYCOPHENOLATE MOFETIL 250 MG: 250 CAPSULE ORAL at 18:59

## 2020-09-08 RX ADMIN — VITAMIN D, TAB 1000IU (100/BT) 1000 UNITS: 25 TAB at 04:33

## 2020-09-08 RX ADMIN — ERTAPENEM SODIUM 1 G: 1 INJECTION, POWDER, LYOPHILIZED, FOR SOLUTION INTRAMUSCULAR; INTRAVENOUS at 18:59

## 2020-09-08 RX ADMIN — ACETAMINOPHEN 650 MG: 325 TABLET, FILM COATED ORAL at 22:02

## 2020-09-08 RX ADMIN — FAMOTIDINE 20 MG: 20 TABLET ORAL at 08:09

## 2020-09-08 RX ADMIN — PIPERACILLIN SODIUM AND TAZOBACTAM SODIUM 2.25 G: 2; .25 INJECTION, POWDER, LYOPHILIZED, FOR SOLUTION INTRAVENOUS at 11:24

## 2020-09-08 RX ADMIN — MYCOPHENOLATE MOFETIL 250 MG: 250 CAPSULE ORAL at 08:08

## 2020-09-08 RX ADMIN — POTASSIUM CHLORIDE 20 MEQ: 750 TABLET, EXTENDED RELEASE ORAL at 04:33

## 2020-09-08 RX ADMIN — FUROSEMIDE 40 MG: 40 TABLET ORAL at 08:09

## 2020-09-08 RX ADMIN — CYCLOSPORINE 25 MG: 25 CAPSULE, LIQUID FILLED ORAL at 08:09

## 2020-09-08 RX ADMIN — PIPERACILLIN SODIUM AND TAZOBACTAM SODIUM 2.25 G: 2; .25 INJECTION, POWDER, LYOPHILIZED, FOR SOLUTION INTRAVENOUS at 05:26

## 2020-09-08 RX ADMIN — WARFARIN SODIUM 2.5 MG: 2.5 TABLET ORAL at 13:23

## 2020-09-08 RX ADMIN — VITAMIN D, TAB 1000IU (100/BT) 1000 UNITS: 25 TAB at 22:02

## 2020-09-08 RX ADMIN — CYCLOSPORINE 25 MG: 25 CAPSULE, LIQUID FILLED ORAL at 20:11

## 2020-09-08 RX ADMIN — PREDNISONE 5 MG: 5 TABLET ORAL at 08:08

## 2020-09-08 RX ADMIN — ACETAMINOPHEN 650 MG: 325 TABLET, FILM COATED ORAL at 15:34

## 2020-09-08 ASSESSMENT — ACTIVITIES OF DAILY LIVING (ADL)
ADLS_ACUITY_SCORE: 20
ADLS_ACUITY_SCORE: 20
RETIRED_COMMUNICATION: 0-->UNDERSTANDS/COMMUNICATES WITHOUT DIFFICULTY
ADLS_ACUITY_SCORE: 21
FALL_HISTORY_WITHIN_LAST_SIX_MONTHS: NO
COGNITION: 0 - NO COGNITION ISSUES REPORTED
TRANSFERRING: 1-->ASSISTIVE EQUIPMENT
BATHING: 0-->INDEPENDENT
ADLS_ACUITY_SCORE: 21
TOILETING: 2-->ASSISTIVE PERSON
AMBULATION: 1-->ASSISTIVE EQUIPMENT
RETIRED_EATING: 0-->INDEPENDENT
DRESS: 0-->INDEPENDENT
ADLS_ACUITY_SCORE: 21
SWALLOWING: 0-->SWALLOWS FOODS/LIQUIDS WITHOUT DIFFICULTY
WHICH_OF_THE_ABOVE_FUNCTIONAL_RISKS_HAD_A_RECENT_ONSET_OR_CHANGE?: AMBULATION;TRANSFERRING

## 2020-09-08 ASSESSMENT — MIFFLIN-ST. JEOR: SCORE: 948.02

## 2020-09-08 NOTE — LETTER
Health Information Management Services               Recipient:  [facility] Admissions  PH: [phone]  F: [phone]        Sender:  NICKO London LCSW  Weekend Adult Acute Care      Care Management Dept: PH: [phone]  4A, 4C, 4E, 5A and 5B; pager [phone]      Date: September 12, 2020  Patient Name:  Meliza Krishnamurthy  Routing Message:  Please see enclosed discharge orders.  Pt has family transporting at 10 am today.  Will send discharge summary when completed.  Thank you, danny          The documents accompanying this notice contain confidential information belonging to the sender.  This information is intended only for the use of the individual or entity named above.  The authorized recipient of this information is prohibited from disclosing this information to any other party and is required to destroy the information after its stated need has been fulfilled, unless otherwise required by state law.      If you are not the intended recipient, you are hereby notified that any disclosure, copy, distribution or action taken in reliance on the contents of these documents is strictly prohibited.  If you have received this document in error, please return it by fax to [phone] with a note on the cover sheet explaining why you are returning it (e.g. not your patient, not your provider, etc.).  If you need further assistance, please call [facility] Centralized Transcription at [phone].  Documents may also be returned by mail to [email or web address], , [facility] [address] [place], [place] [address].

## 2020-09-08 NOTE — PHARMACY-ADMISSION MEDICATION HISTORY
Admission Medication History Completed by Pharmacy    See [facility] Admission Navigator for allergy information, preferred outpatient pharmacy, prior to admission medications and immunization status.     Medication History Sources:     Phone call to [facility] ([phone]), Dispense history ([employer]), Phone call to patient's daughter Reyna ([phone]), Chart Review, Care Everywhere ([place] med list), Med history conducted on 8/16 at [facility]     Changes made to PTA medication list (reason):    Added:  o  Amiodarone 200 mg daily (daughter confirms patient should be taking once daily, on [facility] med list, 7/20 office visit with patient's cardiologist (Shabnam Loera) instructs patient to continue taking her amiodarone)    Deleted:   o Amlodipine (last filled March 2020 at [facility], if patient is compliant, she should be out of this medication now. Was not on [facility] discharge med list from 9/7 encounter)  o Aspirin: not on [facility] med list nor on med list at time of discharge from [facility] on 8/21. Per chart review, note from Dr. Cheek from 10/14/19 states the patient's aspirin was stopped due to anemia.     Changed:  o Ezetimibe 10 mg every day -> 5 mg HS (directions per [facility] and discharge notes from [facility] on 8/21/20)  o Famotidine no directions -> 20 mg BID (per Chart Review)  o Furosemide 20 mg every day -> 40 mg in the morning and 20 mg at noon (directions per [facility] and Chart Review)  o Metoprolol 25 mg BID -> metoprolol 12.5 mg BID (directions per [facility] and discharge notes from [facility] on 8/21/20. Change confirmed by patient's daughter)  o Mycophenolate 500 mg in the morning and 250 mg in the evening (directions per [facility]) -> mycophenolate 250 mg BID (directions per discharge notes from [facility] on 8/21/20 as instructed by Dr. Johnson)  o Potassium 20 mEq no frequency -> 20 mEq BID (directions per [facility])  o Warfarin 2.5 mg  every day -> 1.25 every day or as directed (directions per [facility], confirmed with Anticoagulation Chart Review)    Additional Information:    Medication history was completed to the writer's best knowledge using sources listed above. Could not obtain last doses due to patient being an unreliable historian (per RN) and unavailable for phone interview. Patient's son and daughter help set up patient's medications though daughter reports the patient is primarily the one responsible for her medication management and administration.    Cyclosporine dosin mg BID (per discharge notes from [facility] on 20)    Metolazone was last filled in 2020 as  office visit with Dr. Loera instructs the patient to hold off on taking metolazone.     Prednisone and sulfamethoxazole-trimethoprim directions can be confirmed with [facility] and Chart Review    Patient's warfarin is managed by [facility]. Due to the patient's INR being supratheraputic, the patient was instructed to HOLD her warfarin  and then take 1.25 mg  - . Patient's INR goal is 2-3 for a history of atrial fibrillation.     Prior to Admission medications    Medication Sig Last Dose Taking? Auth Provider   amiodarone (PACERONE) 200 MG tablet Take 200 mg by mouth daily Unknown at Unknown time  Unknown, Entered By History   cholecalciferol (VITAMIN D-1000 MAX ST) 1000 units TABS Take 1,000 Units by mouth At Bedtime Unknown at Unknown time  Reported, Patient   cycloSPORINE modified (GENERIC EQUIVALENT) 25 MG capsule Take 1 capsule (25 mg) by mouth 2 times daily Unknown at dose per [facility] discharge notes   Dat Aquino MD   ezetimibe (ZETIA) 10 MG tablet Take 5 mg by mouth daily  Unknown at Unknown time  Reported, Patient   famotidine (PEPCID) 20 MG tablet Take 20 mg by mouth 2 times daily  Unknown at Unknown time  Reported, Patient   Ferrous Sulfate 324 (65 Fe) MG TBEC Take 324 mg by mouth 2 times daily   Unknown at Unknown time  Reported, Patient   furosemide (LASIX) 20 MG tablet Take 40 mg by mouth in the morning and 20 mg at noon Unknown at Unknown time  Reported, Patient   metoprolol tartrate (LOPRESSOR) 25 MG tablet Take 12.5 mg by mouth 2 times daily Unknown at Unknown time  Unknown, Entered By History   Multiple Vitamins-Minerals (MULTIVITAL PO) Take 1 tablet by mouth daily  Unknown at Unknown time  Reported, Patient   mycophenolate (GENERIC EQUIVALENT) 250 MG capsule TAKE 2 CAPSULES BY MOUTH EVERY MORNING AND 1 CAPSULE EVERY EVENING  Patient taking differently: Take 250 mg by mouth 2 times daily  Unknown at dose per [facility] discharge notes 8/21  Noemi Renee MD   potassium chloride ER (K-DUR/KLOR-CON M) 20 MEQ CR tablet Take 20 mEq by mouth 2 times daily  Unknown at Unknown time  Reported, Patient   predniSONE (DELTASONE) 5 MG tablet Take 1 tablet by mouth daily. Unknown at Unknown time  Noemi Renee MD   sulfamethoxazole-trimethoprim (BACTRIM) 400-80 MG per tablet Take 1 tablet by mouth daily Unknown at Unknown time  Reported, Patient   warfarin ANTICOAGULANT (COUMADIN) 2.5 MG tablet Take 1.25 mg by mouth daily or as directed Unknown at Unknown time  Reported, Patient       Date completed: 09/08/20    Medication history completed by: Virgil Fuchs, Pharmacy Student

## 2020-09-08 NOTE — CONSULTS
[facility], [place]  Transplant Nephrology Consult  Date of Admission:  9/8/2020  Today's Date: 09/08/2020  Requesting physician: Abby Will MD    Recommendations:  -Continue home immunosuppression including cyclosporine 25 mg p.o. twice daily, CellCept 500 mg in the morning and 250 mg in the evening, prednisone 5 mg daily  -Continue Bactrim single strength daily for PJP prophylaxis  -Follow-up urine culture results, agree with continuing Zosyn empirically for urinary tract infection  -Obtain cyclosporine level prior to dose tomorrow morning  -Continue to trend daily BMP for hyponatremia  -Renal Txp U/S to rule out perinephric abscess    Assessment & Plan   # DDKT: Trend down, TAZ on presentation to OSH w/ Scr of 1, now 0.8   - Baseline Cr ~ 0.8   - Proteinuria: Normal (<0.2 grams)   - Date DSA Last Checked: Oct/2018      Latest DSA: Significant DSA (>3000 mfi) to multiple class I antigens    - BK Viremia: No   - Kidney Tx Biopsy: No      # Immunosuppression: Cyclosporine (goal 50-75), Mycophenolate mofetil (dose 500mg in AM/250mg in PM) and Prednisone (dose 5 mg daily)   - Changes: No, obtain CsA trough in AM    # Infection Prophylaxis:   - PJP: Sulfa/TMP (Bactrim)    # Hypertension: Controlled;  Goal BP: < 130/80   - Volume status: Euvolemic    - Changes: Yes - agree with holding amlodipine 5mg PO daily for now given UTI and concern for impending sepsis      # Fever in immunocompromised patient:   -Follow-up blood cultures, urine cultures.   -Agree with continuing Zosyn for empiric treatment of urinary tract  infection.   -Obtain renal transplant ultrasound to rule out perinephric abscess due to  high fevers on presentation   -Hold off on viral studies at this point   -Per primary team no concern for wound infection   -F/u COVID    #Hyponatremia:   -Improved with IV fluids.  Continue IV fluids and treatment of urinary tract  infection      # Transplant History:  Etiology of  Kidney Failure: PKD  Tx: DDKT  Transplant: 1998 (Kidney)  Donor Type: Donation after Brain Death        Donor Class: Standard Criteria Donor    Recommendations were communicated to the primary team via this note.      Brett Henderson MD  Pager: [phone]    REASON FOR CONSULT :  Kidney transplant, Immunosuppression    History of Present Illness   Meliza Krishnamurthy is a 84-year-old female with a past medical history of polycystic kidney disease status post  donor kidney transplant 1998 with a baseline serum creatinine of 0.8 for which he takes cyclosporine (goal trough 50-75), CellCept 500/250, prednisone 5 mg daily, previously admitted with COVID, GNR bacteremia with Klebsiella, now back on baseline immunosuppression as of , combined systolic and diastolic CHF with an ejection fraction of 49% and grade 3 diastolic dysfunction on echocardiogram 2020, who presented due to shortness of breath and fever up to 105  F, found on work-up to have a urinalysis concerning for infection who was transferred to [facility] for further management for whom transplant nephrology was consulted for assistance.  The patient stated that she was noted to have a fever at home but that she only had chills.  She denies any dysuria, back pain, nausea, vomiting, diarrhea.  The patient stated that she noted that she had some cloudy urine.  She notes that she has bilateral lower extremity scabs and that she previously had some left lower extremity pain but no longer.  She denies any shortness of breath currently.  She denies any chest pain, cough.    Due to COVID rule out I spoke to the patient on the phone to decrease possible transmission of COVID as only essential members of the team were entering the room.       Review of Systems    The 10 point Review of Systems is negative other than noted in the HPI or here.     Past Medical History    I have reviewed this patient's medical history and updated it with pertinent information  "if needed.   Past Medical History:   Diagnosis Date     -donor kidney transplant     one rejection      Polycystic kidney disease      S/P cerebral aneurysm repair      S/P hip replacement     multiple revisions     Skin cancer of face        Past Surgical History   I have reviewed this patient's surgical history and updated it with pertinent information if needed.  No past surgical history on file.   S/p kidney transplant     Family History   I have reviewed this patient's family history and updated it with pertinent information if needed.   No family history on file.   +Family history of PKD    Social History   I have reviewed this patient's social history and updated it with pertinent information if needed. Meliza Krishnamurthy  reports that she has never smoked. She has never used smokeless tobacco. She reports current alcohol use. She reports that she does not use drugs.    Allergies   No Known Allergies  Prior to Admission Medications     aspirin  81 mg Oral Daily     cycloSPORINE modified  25 mg Oral BID     famotidine  20 mg Oral Daily     ferrous sulfate  324 mg Oral Daily     furosemide  40 mg Oral Daily     metoprolol tartrate  12.5 mg Oral BID     mycophenolate  250 mg Oral BID IS     piperacillin-tazobactam  2.25 g Intravenous Q6H     [START ON 2020] potassium chloride ER  20 mEq Oral Daily     pravastatin  10 mg Oral Daily     predniSONE  5 mg Oral Daily     sodium chloride (PF)  3 mL Intracatheter Q8H     sulfamethoxazole-trimethoprim  1 tablet Oral Daily     Vitamin D3  1,000 Units Oral At Bedtime       Warfarin Therapy Reminder         Physical Exam   Temp  Av.6  F (37  C)  Min: 98.6  F (37  C)  Max: 98.6  F (37  C)      Pulse  Av.5  Min: 71  Max: 72 SpO2  Av.4 %  Min: 86 %  Max: 96 %     /58   Pulse 71   Temp 98.6  F (37  C) (Oral)   Ht 1.626 m (5' 4\")   Wt 51.3 kg (113 lb 1.6 oz)   SpO2 95%   BMI 19.41 kg/m     Date 20 0700 - 20 " 0659   Shift 9406-3543 1269-0343 7683-3341 24 Hour Total   INTAKE   P.O. 240   240   Shift Total(mL/kg) 240(4.68)   240(4.68)   OUTPUT   Urine 100   100   Shift Total(mL/kg) 100(1.95)   100(1.95)   Weight (kg) 51.3 51.3 51.3 51.3      Admit Weight: 51.3 kg (113 lb 1.6 oz)     Not examined as she is being ruled out for COVID. I spoke to her on the phone    Data   CMP  Recent Labs   Lab 09/08/20  0452      POTASSIUM 3.9   CHLORIDE 102   CO2 24   ANIONGAP 7   GLC 80   BUN 16   CR 0.83   GFRESTIMATED 64   GFRESTBLACK 75   FARHAT 8.7     CBC  Recent Labs   Lab 09/08/20 0452   HGB 11.3*   WBC 8.1   RBC 3.83   HCT 36.2   MCV 95   MCH 29.5   MCHC 31.2*   RDW 17.6*        INR  Recent Labs   Lab 09/08/20  0452   INR 1.53*     ABGNo lab results found in last 7 days.   Urine Studies  No lab results found.  Recent Labs   Lab Test 10/28/16  1453   UTPG 0.39*     PTH  No lab results found.  Iron Studies  Recent Labs   Lab Test 10/30/18  1508   IRON 41   *   IRONSAT 10*       IMAGING:  All imaging studies reviewed by me.

## 2020-09-08 NOTE — H&P
[facility], [place]    History and Physical - Hospitalist Service, Gold Night       Date of Admission:  9/8/2020    Assessment & Plan   Meliza Krishnamurthy is a 84 year old female admitted on 9/8/2020. She presents as a direct admit from [facility] due to renal transplant. Ms Faith presented to [facility] on 9/7/20 with fevers (up to 105) and found to have a UTI. She has a past medical history significant for polycystic kidney disease, renal transplant in 1998 followed at [facility], chronic immunosuppression (cyclosporine, mycophenolate, and prednisone), chronic pneumocystis prophylaxis with Bactrim, systolic (ejection fraction 49%) and diastolic congestive heart failure, carotid artery stenosis, cerebral artery aneurysm with surgical repair, atrial fibrillation, hypertension and dyslipidemia  She was recently hospitalized at [facility] with COVID-19 related pneumonia.        # UTI in transplanted kidney  Kidney transplanted in 1998 due to polycystic kidney disease. She is currently on cyclosporin, mycophenolate and prednisone. Bactrim prophylaxis. Work up at [facility] shows large leukocyte esterase and  WBC,  - renal transplant consult  - repeat UA/UC  - start zosyn until culture  - cyclosporin 25 mg twice daily  - mycophenolate 250 twice daily  - prednisone 5 mg daily  - Bactrim prophylaxis       # HTN  # HFrEF  # HDL  # Atrial fibrillation  Last echocardiogram 6/24/2020 shows EF of 49% inferior wall motion abnormality, grade III diastolic dysfunction, elevated left ventricular filling pressures, mild to moderate AI, moderate MR, and moderate to severe TR  - amiodarone 200 mg daily (needs to be verified and ordered)  - metoprolol 12.5 mg twice daily  - lasix 40 mg daily  - coumadin daily- INR check now and consult for pharmacy to dose with goal INR 2-3  - pharmacy consult for medication reconciliation   - pravastatin 10 mg daily  - EKG now    Other notable conditions:  #  Anemia: iron supplements   # LE discoloration and scattered abrasions  # Cerebral artery aneurysm s/p surgical repair: neurologically intact       Diet: Reguar  DVT Prophylaxis: Warfarin  Sams Catheter: not present  Code Status: full code         Disposition Plan   Expected discharge: 2 - 3 days, recommended to prior living arrangement once adequate pain management/ tolerating PO medications and antibiotic plan established.  Entered: PRADEEP Chi CNP 2020, 3:35 AM     The patient's care was discussed with the Attending Physician, Dr. Woods.    PRADEEP Chi CNP  [facility], [place]  Pager: Gold night  Please see sticky note for cross cover information  ______________________________________________________________________    Chief Complaint   fever    History is obtained from the patient    History of Present Illness   Meliza Krishnamurthy is a 84 year old female who initially presented to [facility] with fever and found to have a UTI. She was subsequently transferred to the [facility] for on-going care due to renal transplant.    Review of Systems    Review Of Systems  Skin: LE discoloration  Eyes: negative  Ears/Nose/Throat: negative  Respiratory: No shortness of breath, dyspnea on exertion, cough, or hemoptysis  Cardiovascular: Irregular rhythm   Gastrointestinal: negative  Genitourinary: negative  Musculoskeletal: general weakness  Neurologic: negative  Psychiatric: negative  Hematologic/Lymphatic/Immunologic: negative  Endocrine: negative      Past Medical History    I have reviewed this patient's medical history and updated it with pertinent information if needed.   Past Medical History:   Diagnosis Date     -donor kidney transplant     one rejection      Polycystic kidney disease      S/P cerebral aneurysm repair      S/P hip replacement     multiple revisions     Skin cancer of face        Past Surgical History   I  have reviewed this patient's surgical history and updated it with pertinent information if needed.  No past surgical history on file.    Social History   I have reviewed this patient's social history and updated it with pertinent information if needed.  Social History     Tobacco Use     Smoking status: Never Smoker     Smokeless tobacco: Never Used   Substance Use Topics     Alcohol use: Yes     Comment: occ wine     Drug use: No       Family History         Prior to Admission Medications   Prior to Admission Medications   Prescriptions Last Dose Informant Patient Reported? Taking?   ASPIRIN   Yes No   Si mg per day   Ezetimibe (ZETIA PO)   Yes No   Sig: Take  by mouth. 10mg once per day   Ferrous Sulfate 324 (65 Fe) MG TBEC   Yes No   Sig: Take 65 mg of iron by mouth   Furosemide (LASIX PO)   Yes No   Sig: Take  by mouth. 20mg 1 tab per day   METOPROLOL TARTRATE   Yes No   Simg. Twice daily.    Multiple Vitamins-Minerals (MULTIVITAL PO)   Yes No   Sig: Take  by mouth.   amLODIPine (NORVASC) 5 MG tablet   Yes No   Sig: Take 5 mg by mouth   cholecalciferol (VITAMIN D-1000 MAX ST) 1000 units TABS   Yes No   Sig: Take 1,000 Units by mouth At Bedtime   cycloSPORINE modified (GENERIC EQUIVALENT) 25 MG capsule   No No   Sig: Take 1 capsule (25 mg) by mouth 2 times daily   famotidine (PEPCID) 20 MG tablet   Yes No   mycophenolate (GENERIC EQUIVALENT) 250 MG capsule   No No   Sig: TAKE 2 CAPSULES BY MOUTH EVERY MORNING AND 1 CAPSULE EVERY EVENING   potassium chloride ER (K-DUR/KLOR-CON M) 20 MEQ CR tablet   Yes No   Sig: Take 20 mEq by mouth   predniSONE (DELTASONE) 5 MG tablet   No No   Sig: Take 1 tablet by mouth daily.   sulfamethoxazole-trimethoprim (BACTRIM) 400-80 MG per tablet   Yes No   Sig: Take 1 tablet by mouth daily   warfarin ANTICOAGULANT (COUMADIN) 2.5 MG tablet   Yes No   Sig: Take 2.5 mg daily or as directed by coag clinic. (changing tablet strength)      Facility-Administered Medications: None      Allergies   No Known Allergies    Physical Exam   Vital Signs: Temp: 98.6  F (37  C) Temp src: Oral BP: (!) 176/88 Pulse: 72     SpO2: 96 % O2 Device: None (Room air)    Weight: 113 lbs 1.6 oz    Physical Exam   Constitutional:   Well nourished, well developed, resting comfortably   Head: Normocephalic and atraumatic.   Eyes: Conjunctivae are normal. Pupils are equal, round, and reactive to light.  Pharynx has no erythema or exudate, mucous membranes are moist  Neck:   No adenopathy, no bony tenderness  Cardiovascular: Regular rate and rhythm without murmurs or gallops  Pulmonary/Chest: Clear to auscultation bilaterally, with no wheezes or retractions. No respiratory distress.  GI: Soft with good bowel sounds.  Non-tender, non-distended, with no guarding, no rebound, no peritoneal signs.   Back:  No bony or CVA tenderness   Musculoskeletal:  No edema or clubbing Moves all extremities equal. General weakness  Skin: Skin is warm and dry. No rash noted. LE discolorations  Neurological: Alert and oriented to person, place, and time. Nonfocal exam  Psychiatric:  Normal mood and affect.    Data   Data reviewed today: I reviewed all medications, new labs and imaging results over the last 24 hours. I

## 2020-09-08 NOTE — PLAN OF CARE
"Time: 0330-1130    Reason for admission/Dx:   Fever, UTI  UTI (urinary tract infection)     /58   Pulse 71   Temp 98.6  F (37  C) (Oral)   Ht 1.626 m (5' 4\")   Wt 51.3 kg (113 lb 1.6 oz)   SpO2 92%   BMI 19.41 kg/m      Shift changes: Pt admitted from OSH-[facility] for UTI, fevers (T-max 105, per RN/EMS report) and SOB 2/2 PMH R-renal tx and recent COVID + hx (last month). Pt was COVID-19 negative on 8/25, but returns as PUI; [facility] performed reswab on 9/7; results pending. Upon arrival to , pt /88, otherwise VSS, on RA; sats 92%+. Afebrile. On Tele: 1AVB + BBB. Initially c/o of \"throbbing headache and back pain\", oxycodone and tylenol given, with subsequent relief. Initially pt disoriented to month only, otherwise answers questions appropriately and is a reliable historian. Did not get OOB, during NOC, however A1 + W.  Initiated IV Zosyn regimen; continuing PTA Bactrim ppx. Voiding and stooling WDL, per pt report, without dysuria, hematuria, or diarrhea. Tolerating regular diet.    Plan: F/u COVID-19 status with [facility]. Collect UA. Neph/Transplant consulting. ABX.     "

## 2020-09-08 NOTE — PHARMACY-ANTICOAGULATION SERVICE
Clinical Pharmacy - Warfarin Dosing Consult     Pharmacy has been consulted to manage this patient s warfarin therapy.  Indication: Atrial Fibrillation  Therapy Goal: INR 2-3  OP Anticoag Clinic: [facility]  Warfarin Prior to Admission: Yes  Warfarin PTA Regimen: 1.25 mg Tues/Sat, 2.5 mg all other days  Significant drug interactions: aspirin, bactrim, zosyn, prednisone    INR   Date Value Ref Range Status   09/08/2020 1.53 (H) 0.86 - 1.14 Final       Recommend warfarin 2.5 mg today.  Pharmacy will monitor Meliza Krishnamurthy daily and order warfarin doses to achieve specified goal.      Please contact pharmacy as soon as possible if the warfarin needs to be held for a procedure or if the warfarin goals change.      Ashlee Taylor, PharmD  PGY1 Pharmacy Resident

## 2020-09-08 NOTE — PHARMACY-VANCOMYCIN DOSING SERVICE
Pharmacy Vancomycin Initial Note  Date of Service 2020  Patient's  1935  84 year old, female    Indication: Bacteremia    Current estimated CrCl = Estimated Creatinine Clearance: 40.9 mL/min (based on SCr of 0.83 mg/dL).    Creatinine for last 3 days  2020:  4:52 AM Creatinine 0.83 mg/dL    Recent Vancomycin Level(s) for last 3 days  No results found for requested labs within last 72 hours.      Vancomycin IV Administrations (past 72 hours)      No vancomycin orders with administrations in past 72 hours.                Nephrotoxins and other renal medications (From now, onward)    Start     Dose/Rate Route Frequency Ordered Stop    20 1700  vancomycin (VANCOCIN) 1000 mg in dextrose 5% 200 mL PREMIX      1,000 mg  200 mL/hr over 1 Hours Intravenous EVERY 24 HOURS 20 1652      20 0800  cycloSPORINE modified (GENERIC EQUIVALENT) capsule 25 mg      25 mg Oral 2 TIMES DAILY 20 0400      20 0800  [Held by provider]  furosemide (LASIX) tablet 40 mg     (Held by provider since 2020 at 1617 by Abby Will MD. Reason: Other.)    40 mg Oral DAILY 20 0538            Contrast Orders - past 72 hours (72h ago, onward)    None                Plan:  1.  Start vancomycin 1000 mg IV q24h.   2.  Goal Trough Level: 15-20 mg/L   3.  Pharmacy will check trough levels as appropriate in 1-3 Days.    4. Serum creatinine levels will be ordered daily for the first week of therapy and at least twice weekly for subsequent weeks.    5. [place] method utilized to dose vancomycin therapy: Method 2    Kayla Anne, PharmD

## 2020-09-09 ENCOUNTER — APPOINTMENT (OUTPATIENT)
Dept: OCCUPATIONAL THERAPY | Facility: CLINIC | Age: 85
DRG: 698 | End: 2020-09-09
Attending: STUDENT IN AN ORGANIZED HEALTH CARE EDUCATION/TRAINING PROGRAM
Payer: MEDICARE

## 2020-09-09 ENCOUNTER — APPOINTMENT (OUTPATIENT)
Dept: PHYSICAL THERAPY | Facility: CLINIC | Age: 85
DRG: 698 | End: 2020-09-09
Attending: STUDENT IN AN ORGANIZED HEALTH CARE EDUCATION/TRAINING PROGRAM
Payer: MEDICARE

## 2020-09-09 ENCOUNTER — APPOINTMENT (OUTPATIENT)
Dept: ULTRASOUND IMAGING | Facility: CLINIC | Age: 85
DRG: 698 | End: 2020-09-09
Attending: INTERNAL MEDICINE
Payer: MEDICARE

## 2020-09-09 LAB
ANION GAP SERPL CALCULATED.3IONS-SCNC: 8 MMOL/L (ref 3–14)
ANION GAP SERPL CALCULATED.3IONS-SCNC: 8 MMOL/L (ref 3–14)
BUN SERPL-MCNC: 14 MG/DL (ref 7–30)
BUN SERPL-MCNC: 16 MG/DL (ref 7–30)
CALCIUM SERPL-MCNC: 8.3 MG/DL (ref 8.5–10.1)
CALCIUM SERPL-MCNC: 8.3 MG/DL (ref 8.5–10.1)
CHLORIDE SERPL-SCNC: 102 MMOL/L (ref 94–109)
CHLORIDE SERPL-SCNC: 102 MMOL/L (ref 94–109)
CO2 SERPL-SCNC: 22 MMOL/L (ref 20–32)
CO2 SERPL-SCNC: 22 MMOL/L (ref 20–32)
CREAT SERPL-MCNC: 0.78 MG/DL (ref 0.52–1.04)
CREAT SERPL-MCNC: 0.83 MG/DL (ref 0.52–1.04)
CYCLOSPORINE BLD LC/MS/MS-MCNC: 69 UG/L (ref 50–400)
ERYTHROCYTE [DISTWIDTH] IN BLOOD BY AUTOMATED COUNT: 17.2 % (ref 10–15)
GFR SERPL CREATININE-BSD FRML MDRD: 64 ML/MIN/{1.73_M2}
GFR SERPL CREATININE-BSD FRML MDRD: 70 ML/MIN/{1.73_M2}
GLUCOSE SERPL-MCNC: 125 MG/DL (ref 70–99)
GLUCOSE SERPL-MCNC: 84 MG/DL (ref 70–99)
HCT VFR BLD AUTO: 30.8 % (ref 35–47)
HGB BLD-MCNC: 9.8 G/DL (ref 11.7–15.7)
INR PPP: 1.68 (ref 0.86–1.14)
IRON SATN MFR SERPL: 9 % (ref 15–46)
IRON SERPL-MCNC: 16 UG/DL (ref 35–180)
MAGNESIUM SERPL-MCNC: 1.9 MG/DL (ref 1.6–2.3)
MCH RBC QN AUTO: 29.3 PG (ref 26.5–33)
MCHC RBC AUTO-ENTMCNC: 31.8 G/DL (ref 31.5–36.5)
MCV RBC AUTO: 92 FL (ref 78–100)
PLATELET # BLD AUTO: 162 10E9/L (ref 150–450)
POTASSIUM SERPL-SCNC: 3.4 MMOL/L (ref 3.4–5.3)
POTASSIUM SERPL-SCNC: 3.9 MMOL/L (ref 3.4–5.3)
RBC # BLD AUTO: 3.34 10E12/L (ref 3.8–5.2)
SODIUM SERPL-SCNC: 132 MMOL/L (ref 133–144)
SODIUM SERPL-SCNC: 133 MMOL/L (ref 133–144)
TIBC SERPL-MCNC: 188 UG/DL (ref 240–430)
TME LAST DOSE: NORMAL H
TRANSFERRIN SERPL-MCNC: 145 MG/DL (ref 210–360)
WBC # BLD AUTO: 6.2 10E9/L (ref 4–11)

## 2020-09-09 PROCEDURE — 97535 SELF CARE MNGMENT TRAINING: CPT | Mod: GO | Performed by: OCCUPATIONAL THERAPIST

## 2020-09-09 PROCEDURE — 36415 COLL VENOUS BLD VENIPUNCTURE: CPT | Performed by: PHYSICIAN ASSISTANT

## 2020-09-09 PROCEDURE — 84466 ASSAY OF TRANSFERRIN: CPT | Performed by: NURSE PRACTITIONER

## 2020-09-09 PROCEDURE — 80048 BASIC METABOLIC PNL TOTAL CA: CPT | Performed by: PHYSICIAN ASSISTANT

## 2020-09-09 PROCEDURE — 25000132 ZZH RX MED GY IP 250 OP 250 PS 637: Mod: GY | Performed by: STUDENT IN AN ORGANIZED HEALTH CARE EDUCATION/TRAINING PROGRAM

## 2020-09-09 PROCEDURE — 97530 THERAPEUTIC ACTIVITIES: CPT | Mod: GP | Performed by: REHABILITATION PRACTITIONER

## 2020-09-09 PROCEDURE — 97110 THERAPEUTIC EXERCISES: CPT | Mod: GP | Performed by: REHABILITATION PRACTITIONER

## 2020-09-09 PROCEDURE — 85610 PROTHROMBIN TIME: CPT | Performed by: NURSE PRACTITIONER

## 2020-09-09 PROCEDURE — 83735 ASSAY OF MAGNESIUM: CPT | Performed by: NURSE PRACTITIONER

## 2020-09-09 PROCEDURE — 76776 US EXAM K TRANSPL W/DOPPLER: CPT

## 2020-09-09 PROCEDURE — 97161 PT EVAL LOW COMPLEX 20 MIN: CPT | Mod: GP | Performed by: REHABILITATION PRACTITIONER

## 2020-09-09 PROCEDURE — 80158 DRUG ASSAY CYCLOSPORINE: CPT | Performed by: INTERNAL MEDICINE

## 2020-09-09 PROCEDURE — 25000132 ZZH RX MED GY IP 250 OP 250 PS 637: Mod: GY | Performed by: NURSE PRACTITIONER

## 2020-09-09 PROCEDURE — 97116 GAIT TRAINING THERAPY: CPT | Mod: GP | Performed by: REHABILITATION PRACTITIONER

## 2020-09-09 PROCEDURE — 97166 OT EVAL MOD COMPLEX 45 MIN: CPT | Mod: GO | Performed by: OCCUPATIONAL THERAPIST

## 2020-09-09 PROCEDURE — 80048 BASIC METABOLIC PNL TOTAL CA: CPT | Performed by: NURSE PRACTITIONER

## 2020-09-09 PROCEDURE — 97530 THERAPEUTIC ACTIVITIES: CPT | Mod: GO | Performed by: OCCUPATIONAL THERAPIST

## 2020-09-09 PROCEDURE — 25000128 H RX IP 250 OP 636: Performed by: STUDENT IN AN ORGANIZED HEALTH CARE EDUCATION/TRAINING PROGRAM

## 2020-09-09 PROCEDURE — 83540 ASSAY OF IRON: CPT | Performed by: NURSE PRACTITIONER

## 2020-09-09 PROCEDURE — 99233 SBSQ HOSP IP/OBS HIGH 50: CPT | Performed by: STUDENT IN AN ORGANIZED HEALTH CARE EDUCATION/TRAINING PROGRAM

## 2020-09-09 PROCEDURE — 83550 IRON BINDING TEST: CPT | Performed by: NURSE PRACTITIONER

## 2020-09-09 PROCEDURE — 36415 COLL VENOUS BLD VENIPUNCTURE: CPT | Performed by: NURSE PRACTITIONER

## 2020-09-09 PROCEDURE — 12000001 ZZH R&B MED SURG/OB UMMC

## 2020-09-09 PROCEDURE — 85027 COMPLETE CBC AUTOMATED: CPT | Performed by: NURSE PRACTITIONER

## 2020-09-09 PROCEDURE — G0463 HOSPITAL OUTPT CLINIC VISIT: HCPCS

## 2020-09-09 PROCEDURE — 25000131 ZZH RX MED GY IP 250 OP 636 PS 637: Mod: GY | Performed by: NURSE PRACTITIONER

## 2020-09-09 RX ORDER — AMLODIPINE BESYLATE 5 MG/1
5 TABLET ORAL DAILY
Status: DISCONTINUED | OUTPATIENT
Start: 2020-09-09 | End: 2020-09-09

## 2020-09-09 RX ORDER — METHYLPREDNISOLONE SODIUM SUCCINATE 125 MG/2ML
125 INJECTION, POWDER, LYOPHILIZED, FOR SOLUTION INTRAMUSCULAR; INTRAVENOUS
Status: DISCONTINUED | OUTPATIENT
Start: 2020-09-09 | End: 2020-09-12 | Stop reason: HOSPADM

## 2020-09-09 RX ORDER — MEROPENEM 1 G/1
1 INJECTION, POWDER, FOR SOLUTION INTRAVENOUS EVERY 8 HOURS
Status: DISCONTINUED | OUTPATIENT
Start: 2020-09-09 | End: 2020-09-10

## 2020-09-09 RX ORDER — DIPHENHYDRAMINE HYDROCHLORIDE 50 MG/ML
50 INJECTION INTRAMUSCULAR; INTRAVENOUS
Status: DISCONTINUED | OUTPATIENT
Start: 2020-09-09 | End: 2020-09-12 | Stop reason: HOSPADM

## 2020-09-09 RX ORDER — AMIODARONE HYDROCHLORIDE 200 MG/1
200 TABLET ORAL DAILY
Status: DISCONTINUED | OUTPATIENT
Start: 2020-09-09 | End: 2020-09-12 | Stop reason: HOSPADM

## 2020-09-09 RX ORDER — WARFARIN SODIUM 2.5 MG/1
2.5 TABLET ORAL
Status: COMPLETED | OUTPATIENT
Start: 2020-09-09 | End: 2020-09-09

## 2020-09-09 RX ADMIN — MYCOPHENOLATE MOFETIL 250 MG: 250 CAPSULE ORAL at 17:23

## 2020-09-09 RX ADMIN — MYCOPHENOLATE MOFETIL 250 MG: 250 CAPSULE ORAL at 09:33

## 2020-09-09 RX ADMIN — POTASSIUM CHLORIDE 20 MEQ: 750 TABLET, EXTENDED RELEASE ORAL at 09:31

## 2020-09-09 RX ADMIN — PREDNISONE 5 MG: 5 TABLET ORAL at 09:32

## 2020-09-09 RX ADMIN — Medication 5 MG: at 22:17

## 2020-09-09 RX ADMIN — WARFARIN SODIUM 2.5 MG: 2.5 TABLET ORAL at 17:23

## 2020-09-09 RX ADMIN — Medication 12.5 MG: at 20:15

## 2020-09-09 RX ADMIN — FAMOTIDINE 20 MG: 20 TABLET ORAL at 09:32

## 2020-09-09 RX ADMIN — Medication 12.5 MG: at 09:33

## 2020-09-09 RX ADMIN — FERROUS SULFATE TAB 325 MG (65 MG ELEMENTAL FE) 324 MG: 325 (65 FE) TAB at 09:33

## 2020-09-09 RX ADMIN — CYCLOSPORINE 25 MG: 25 CAPSULE, LIQUID FILLED ORAL at 20:15

## 2020-09-09 RX ADMIN — CYCLOSPORINE 25 MG: 25 CAPSULE, LIQUID FILLED ORAL at 09:32

## 2020-09-09 RX ADMIN — AMIODARONE HYDROCHLORIDE 200 MG: 200 TABLET ORAL at 09:39

## 2020-09-09 RX ADMIN — SULFAMETHOXAZOLE AND TRIMETHOPRIM 1 TABLET: 400; 80 TABLET ORAL at 09:32

## 2020-09-09 RX ADMIN — ACETAMINOPHEN 650 MG: 325 TABLET, FILM COATED ORAL at 22:18

## 2020-09-09 RX ADMIN — MEROPENEM 1 G: 1 INJECTION, POWDER, FOR SOLUTION INTRAVENOUS at 17:23

## 2020-09-09 RX ADMIN — ACETAMINOPHEN 650 MG: 325 TABLET, FILM COATED ORAL at 05:30

## 2020-09-09 RX ADMIN — VITAMIN D, TAB 1000IU (100/BT) 1000 UNITS: 25 TAB at 22:18

## 2020-09-09 RX ADMIN — VANCOMYCIN HYDROCHLORIDE 1000 MG: 1 INJECTION, SOLUTION INTRAVENOUS at 18:57

## 2020-09-09 ASSESSMENT — ACTIVITIES OF DAILY LIVING (ADL)
ADLS_ACUITY_SCORE: 24
ADLS_ACUITY_SCORE: 25
ADLS_ACUITY_SCORE: 25
PREVIOUS_RESPONSIBILITIES: MEAL PREP;HOUSEKEEPING;LAUNDRY;SHOPPING;MEDICATION MANAGEMENT;DRIVING
ADLS_ACUITY_SCORE: 21
ADLS_ACUITY_SCORE: 25
ADLS_ACUITY_SCORE: 25

## 2020-09-09 NOTE — PROGRESS NOTES
Antimicrobial Stewardship Team Note    Antimicrobial Stewardship Program - A joint venture between [facility] and  Physicians to optimize antibiotic management.  NOT a formal consult - Restricted Antimicrobial Review     Patient: Meliza Krishnamurthy  MRN: [id number]  Allergies: Patient has no known allergies.    Brief Summary: Meliza Krishnamurthy is a 84-year-old female transferred to [facility] from OSH on 9/8/20 who presented with fevers up to 105F with suspected UTI. She has PMH significant for polycystic kidney disease s/p renal transplant in 1998, systolic and diastolic heart failure, carotid artery stenosis, atrial fibrillation, hypertension, and dyslipidemia.    Of note, patient was recently hospitalized 8/16-8/21 at [facility] for COVID-19 infection and received IV remdesivir 8/18-8/21 as well as dexamethasone 6mg daily during her admission. She also had a Klebsiella bacteremia (pan-sensitive) in 1/3 blood cultures from 8/16 for which she was started on meropenem on 8/18, switched to zosyn on 8/20, and discharged with cefdinir 300mg BID on discharge with end date of 8/31/20.    Patient was started on Zosyn upon admission on 9/8 which was escalated to ertapenem and vancomycin after blood cultures from OSH were reported to be growing E. faecalis in 1/3 bottles (no Leeanna/B detected) and Klebsiella pneumoniae in 2/3 bottles. Susceptibilities are still pending at this time. Her urinalysis from OSH had large LE,  WBC, 3-10 RBC, and bacteria present. No urine culture results from OSH.         Active Anti-infective Medications   (From admission, onward)                 Start     Stop    09/08/20 1730  ertapenem  1 g,   Intravenous,   EVERY 24 HOURS     Bacteremia        --    09/08/20 1700  vancomycin in dextrose  1,000 mg,   Intravenous,   200 mL/hr,   EVERY 24 HOURS     Bacteremia        --    09/08/20 0800  sulfamethoxazole-trimethoprim  1 tablet,   Oral,   DAILY     prophylaxis        --                   Assessment: K. pneumoniae and E. faecalis bacteremia 2/2 possible UTI. Patient with history of renal transplant presents with fever and concern for UTI although in the absence of any other noted urinary symptoms. Her blood cultures from OSH are growing Klebsiella pneumoniae and E. faecalis currently being treated with ertapenem and vancomcyin. Patient has no history of ESBL or other resistant pathogens and her most recent blood culture that grew Klebsiella pneumoniae was pan-sensitive. This may be a result of inadequate treatment of the bacteremia from 8/16 as patient only received 4 days of IV antibiotics before being discharged on cefdinir for a total treatment duration of 14 days and oral cephalosporins do not provide optimal concentrations to treat pyelonephritis which was the suspected source of the bacteremia. If patient still has native kidneys, a cyst could be causing this recurrence as well or the transplanted kidney could be infected causing pyelonephritis. Ertapenem is a restricted antibiotic and its use is not warranted at this time. E. faecalis has a 100% susceptibility to ampicillin and penicillin as evidenced by our [facility] antibiogram so empiric treatment with vancomycin is unnecessary. ID consult is strongly recommended in this patient with recurrent Klebsiella bacteremia. As discussed during AMT rounds, de-escalation to ceftriaxone and ampicillin would provide adequate treatment for both K. pneumoniae and E. faecalis pending susceptibilities although we defer to ID for antibiotic selection.    Recommendations:  Strongly consider ID consult in this patient with recurrent Klebsiella pneumoniae bacteremia.    Discussed with ID Staff Dr. Molina and ID Pharmacist, Marlene Higgins, PharmD, Milly SimentalD  September 9, 2020  [facility] Pager: [phone]    Vital Signs/Clinical Features:  Vitals         09/07 0700  -  09/08 0659 09/08 0700  -  09/09 0659 09/09 0700  -  09/09 1437   Most Recent     Temp ( F)   98.6    98.7 -  101.8       100.2 (37.9)    Pulse 71 -  72    65 -  74       74    Resp   16 -  18       16    /58 -  176/88    154/74 -  158/74       157/79    SpO2 (%) 92 -  96    86 -  99       95            Labs  Estimated Creatinine Clearance: 40.9 mL/min (based on SCr of 0.83 mg/dL).  Recent Labs   Lab Test 10/28/16  1453 12/31/19  1101 09/08/20  0452 09/09/20  0616   CR 0.67 0.76 0.83 0.83       Recent Labs   Lab Test 10/28/16  1453 09/08/20  0452 09/09/20  0616   WBC 5.8 8.1 6.2   HGB 12.1 11.3* 9.8*   HCT 37.9 36.2 30.8*   MCV 90 95 92    153 162                   Recent Labs   Lab Test 09/09/20  0616   CYCLSP 69       Culture Results:  7-Day Micro Results       Procedure Component Value Units Date/Time    Blood culture [[id number]] Collected:  09/08/20 1723    Order Status:  Completed Lab Status:  Preliminary result Updated:  09/09/20 1316    Specimen:  Blood      Specimen Description Blood Left Arm     Culture Micro No growth after 18 hours    Blood culture [[id number]] Collected:  09/08/20 1723    Order Status:  Completed Lab Status:  Preliminary result Updated:  09/09/20 1316    Specimen:  Blood      Specimen Description Blood Right Hand     Culture Micro No growth after 18 hours    Urine Culture Aerobic Bacterial [[id number]]  (Abnormal) Collected:  09/08/20 1200    Order Status:  Completed Lab Status:  Preliminary result Updated:  09/09/20 1322    Specimen:  Catheterized Urine      Specimen Description Catheterized Urine     Special Requests Specimen received in preservative     Culture Micro >100,000 colonies/mL  Non lactose fermenting gram negative rods  Susceptibility testing in progress              Recent Labs   Lab Test 09/08/20  1142   URINEPH 6.5   NITRITE Negative   LEUKEST Large*   WBCU >182*                         Imaging: Us Renal Transplant    Result Date: 9/9/2020  EXAMINATION: US RENAL TRANSPLANT,  9/9/2020 10:53 AM COMPARISON: None. HISTORY: UTI, rule out perinephric  abscess TECHNIQUE:  Grey-scale, color Doppler and spectral flow analysis. FINDINGS: The transplant kidney is located in the right lower quadrant, and measures 10.1 cm. Parenchyma is of normal thickness and echogenicity. No focal lesions. There is mild pelvic caliectasis in the setting of overdistended urinary bladder. No perinephric fluid collection. The urinary bladder is overdistended and contains echogenic particles and mucosal thickening. Large volume postvoid residual urine is noted. Renal artery flow: 90 cm/s cm/sec peak systolic at hilum. 76 cm/s peak systolic at anastomosis. Arcuate artery resistive indices (upper to lower): 0.78, 0.74, 0.82 Renal Vein Flow: 38.5 cm/sat hilum. 76 cm/s at anastomosis. Iliac artery flow: 148.6 cm/s peak systolic above anastomosis. 83.3 cm/s peak systolic below anastomosis. Iliac vein flow: Patent above and below the anastomosis.     IMPRESSION: 1.  Mild pelvocaliectasis in the setting of overdistended bladder, otherwise normal transplant kidney ultrasound. No perinephric abscess 2.  Mucosal thickening of the urinary bladder with echogenic particles in the urine is consistent with the patient's history of UTI. STEPHANIE ESTES MD    Xr Chest Port 1 View    Result Date: 9/8/2020  Exam: XR CHEST PORT 1 VW, 9/8/2020 11:18 AM Indication: 83 y/o female with recent history of covid - here with SOB Comparison: None available Findings: Prominent cardiomediastinal silhouette. The pulmonary vasculature is distinct. No appreciable pleural effusion or pneumothorax. Streaky perihilar and bibasilar opacities. Low lung volumes. Thoracic aortic atherosclerotic calcification. Left reverse total shoulder arthroplasty. Multilevel degenerative change in the spine with associated convex left spinal curvature abnormality, apex at T10-11.     Impression: Streaky perihilar and bibasilar opacities may represent atelectasis/scarring in the setting of low lung volumes, though infection is not excluded.  DELMIS JJ, DO

## 2020-09-09 NOTE — PROGRESS NOTES
"[facility], [place]   Transplant Nephrology Progress Note  Date of Admission:  9/8/2020  Today's Date: 09/09/2020    Recommendations:  -Agree with ertapenem and vanc for now due to enterococcus +Klebsiella in urine Cx  -Hold lasix for today, restart 40mg PO daily tmrw   -Restart amlodipine 5mg PO daily today  -Encourage \"double voiding\". She had distended bladder today in transplant U/S     Assessment & Plan      # DDKT: Trend down, TAZ on presentation to OSH w/ Scr of 1, now 0.8              - Baseline Cr ~ 0.8              - Proteinuria: Normal (<0.2 grams)              - Date DSA Last Checked: Oct/2018      Latest DSA: Significant DSA  (>3000 mfi) to multiple class I antigens               - BK Viremia: No              - Kidney Tx Biopsy: No        # Immunosuppression: Cyclosporine (goal 50-75), Mycophenolate mofetil (dose 500mg in AM/250mg in PM) and Prednisone (dose 5 mg daily)              - Changes: No     # Infection Prophylaxis:   - PJP: Sulfa/TMP (Bactrim)     # Hypertension: Uncontrolled;   Goal BP: < 130/80              - Volume status: Euvolemic                  - Changes: Yes - restart amlodipine 5mg PO daily today. Restart lasix  40mg PO daily tomorrow         # Fever in immunocompromised patient:              -Follow-up blood cultures, urine cultures here and OSH.    -UCx and BlCx per primary team at OSH w/ klebsiella and enterococcus.               -Agree with switch to ertapenem and vancomycin for now              -renal transplant ultrasound on 9/9 negative for perinephric abscess.    -Encourage double voiding and more frequent scheduled voiding due to  distended bladder               -Hold off on viral studies at this point             #Hyponatremia:              -stable after IVF. Ok to stop IVF at this point       # Anemia:   -Evidence of iron deficiency with Tsat 9% but would hold off on IV iron with infection. Resume oral iron     # Transplant History:  Etiology " "of Kidney Failure: PKD  Tx: DDKT  Transplant: 1998 (Kidney)  Donor Type: Donation after Brain Death        Donor Class: Standard Criteria Donor     Recommendations were communicated to the primary team via this note.        Brett Henderson MD  Pager: [phone]    Interval History   -Renal txp U/S negative  -Pt with enterococcus and Klebsiella bacteremia 1/3 bottles from OSH    Review of Systems   4 point ROS was obtained and negative except as noted in the Interval History.    MEDICATIONS:    amiodarone  200 mg Oral Daily     cycloSPORINE modified  25 mg Oral BID     ertapenem (INVanz) IV  1 g Intravenous Q24H     ezetimibe  5 mg Oral At Bedtime     famotidine  20 mg Oral Daily     ferrous sulfate  324 mg Oral Daily     [Held by provider] furosemide  40 mg Oral Daily     metoprolol tartrate  12.5 mg Oral BID     mycophenolate  250 mg Oral BID IS     potassium chloride ER  20 mEq Oral Daily     predniSONE  5 mg Oral Daily     sodium chloride (PF)  3 mL Intracatheter Q8H     sulfamethoxazole-trimethoprim  1 tablet Oral Daily     vancomycin (VANCOCIN) IV  1,000 mg Intravenous Q24H     Vitamin D3  1,000 Units Oral At Bedtime     warfarin ANTICOAGULANT  2.5 mg Oral ONCE at 18:00       Warfarin Therapy Reminder         Physical Exam   Temp  Av.8  F (37.7  C)  Min: 98.6  F (37  C)  Max: 101.8  F (38.8  C)      Pulse  Av  Min: 65  Max: 74 Resp  Av.3  Min: 16  Max: 18  SpO2  Av %  Min: 86 %  Max: 99 %     BP (!) 157/79 (BP Location: Right arm)   Pulse 74   Temp 100.2  F (37.9  C) (Oral)   Resp 16   Ht 1.626 m (5' 4\")   Wt 51.3 kg (113 lb 1.6 oz)   SpO2 95%   BMI 19.41 kg/m      Admit Weight: 51.3 kg (113 lb 1.6 oz)     GENERAL APPEARANCE: alert and no distress  HENT: mouth without ulcers or lesions  LYMPHATICS: no cervical or supraclavicular nodes  RESP: lungs clear to auscultation - no rales, rhonchi or wheezes  CV: regular rhythm, normal rate, no rub, no murmur  EDEMA: no LE edema " bilaterally  ABDOMEN: soft, nondistended, nontender, bowel sounds normal  MS: extremities normal - no gross deformities noted, no evidence of inflammation in joints, no muscle tenderness  SKIN: no rash    Data   All labs reviewed by me.  CMP  Recent Labs   Lab 09/09/20 0616 09/08/20  0452    134   POTASSIUM 3.4 3.9   CHLORIDE 102 102   CO2 22 24   ANIONGAP 8 7   GLC 84 80   BUN 14 16   CR 0.83 0.83   GFRESTIMATED 64 64   GFRESTBLACK 75 75   FARHAT 8.3* 8.7   MAG 1.9  --      CBC  Recent Labs   Lab 09/09/20 0616 09/08/20  0452   HGB 9.8* 11.3*   WBC 6.2 8.1   RBC 3.34* 3.83   HCT 30.8* 36.2   MCV 92 95   MCH 29.3 29.5   MCHC 31.8 31.2*   RDW 17.2* 17.6*    153     INR  Recent Labs   Lab 09/09/20 0616 09/08/20  0452   INR 1.68* 1.53*     ABGNo lab results found in last 7 days.   Urine Studies  Recent Labs   Lab Test 09/08/20  1142   COLOR Yellow   APPEARANCE Slightly Cloudy   URINEGLC Negative   URINEBILI Negative   URINEKETONE Negative   SG 1.015   UBLD Small*   URINEPH 6.5   PROTEIN 30*   NITRITE Negative   LEUKEST Large*   RBCU 22*   WBCU >182*     Recent Labs   Lab Test 10/28/16  1453   UTPG 0.39*     PTH  No lab results found.  Iron Studies  Recent Labs   Lab Test 09/09/20  0616 10/30/18  1508   IRON 16* 41   * 435*   IRONSAT 9* 10*       IMAGING:  All imaging studies reviewed by me.    Renal txp U/S 9/9: normal but with mild pelviectasis in setting of distended bladder. Echogenic material in bladder.

## 2020-09-09 NOTE — CONSULTS
WO Nurse Inpatient Pressure Injury Assessment   Reason for consultation: Evaluate and treat bilateral IT      ASSESSMENT  Bilateral IT wound due to previous site of chronic moisture damaged skin compromised by moisture.   Status: initial assessment  Recommend provider order: None, at this time     TREATMENT PLAN  Bilateral IT wounds : Leave open to air cleanse daily with bath wipes and re consult WOC if area opens  Orders Written  WOC Nurse follow-up plan:signing off  Nursing to notify the Provider(s) and re-consult the WOC Nurse if wound(s) deteriorates or new skin concern.    Patient History  According to provider note(s):  Meliza Krishnamurthy is a 84 year old female admitted on 9/8/2020. She presents as a direct admit from [facility] due to renal transplant. Ms Faith presented to [facility] on 9/7/20 with fevers (up to 105) and found to have a UTI. She has a past medical history significant for polycystic kidney disease, renal transplant in 1998 followed at [facility], chronic immunosuppression (cyclosporine, mycophenolate, and prednisone), chronic pneumocystis prophylaxis with Bactrim, systolic (ejection fraction 49%) and diastolic congestive heart failure, carotid artery stenosis, cerebral artery aneurysm with surgical repair, atrial fibrillation, hypertension and dyslipidemia  She was recently hospitalized at [facility] with COVID-19 related pneumonia.       Objective Data  Containment of urine/stool: Incontinence Protocol and Incontinent pad in bed    Current Diet/ Nutrition:  Orders Placed This Encounter      Combination Diet Regular Diet Adult    Output:   I/O last 3 completed shifts:  In: 480 [P.O.:480]  Out: 225 [Urine:225]    Risk Assessment:   Sensory Perception: 4-->no impairment  Moisture: 3-->occasionally moist  Activity: 3-->walks occasionally  Mobility: 3-->slightly limited  Nutrition: 3-->adequate  Friction and Shear: 3-->no apparent problem  Ry Score: 19    Labs:   Recent Labs   Lab  09/09/20  0616   HGB 9.8*   INR 1.68*   WBC 6.2     Physical Exam  Skin inspection: focused buttock and IT's    Wound Location:  Bilateral IT    right    left    Date of last Photo 9/9  Wound History: pt is ambulatory at home and wears a brief for infrequent incontinence   Wound Base:  100 % intact brawny epidermis      Interventions  Current support surface: Standard  Atmos Air mattress  Current off-loading measures: Pillows  Repositioning aid: Pillows  Visual inspection of wound(s) completed   Wound Care: was done per plan of care.  Supplies: none  Educated provided: None needed  Education provided to: patient  and nurse  Discussed importance of:moisture management  Discussed plan of care with Patient and Nurse    Linda Rivera RN CWOCN

## 2020-09-09 NOTE — PLAN OF CARE
Alert and oriented x 4. Up with assist of 1 and walker. Verbalized generalized body pain with relief from Tylenol. Good appetite. Covid swab result is Negative, ALEXANDER Garza informed and discontinued precautions. PA informed pt of result. Bilateral IT noted with purple discoloration over intact skin which is unblanchable  . Pt denies pain over area. ALEXANDER Garza informed, WOCN consult in place. Pt turns to side independently in bed. On continuous tele monitor. IV antibiotics given thru PIV access. Will continue to monitor and follow plan of care

## 2020-09-09 NOTE — PROGRESS NOTES
09/09/20 1300   Quick Adds   Type of Visit Initial Occupational Therapy Evaluation   Living Environment   Lives With alone   Living Arrangements house   Home Accessibility stairs to enter home   Number of Stairs, Main Entrance 3   Transportation Anticipated car, drives self   Living Environment Comment Pt reports living alone in house, all needs met on main floor. Has 3 children that live near by that are able to assist when needed.   Self-Care   Usual Activity Tolerance moderate   Current Activity Tolerance fair   Regular Exercise Yes   Activity/Exercise Type other (see comments)  (LE ergometer)   Exercise Amount/Frequency daily   Equipment Currently Used at Home shower chair;walker, standard;grab bar, toilet;grab bar, tub/shower   Activity/Exercise/Self-Care Comment Alejandro with 4WW in the home; drives for her own groceries   Functional Level   Ambulation 1-->assistive equipment   Transferring 1-->assistive equipment   Toileting 1-->assistive equipment   Bathing 1-->assistive equipment   Dressing 1-->assistive equipment   Eating 0-->independent   Communication 0-->understands/communicates without difficulty   Cognition 0 - no cognition issues reported   Fall history within last six months no   Which of the above functional risks had a recent onset or change? ambulation;transferring;toileting;bathing;dressing   Prior Functional Level Comment Pt reports Alejandro with all ADLs/IADLs at baseline; children assist with garbage and other lifting.    General Information   Onset of Illness/Injury or Date of Surgery - Date 09/09/20   Referring Physician Abby Vora   Patient/Family Goals Statement home   Additional Occupational Profile Info/Pertinent History of Current Problem 84 year old female admitted on 9/8/2020. She presents as a direct admit from [facility] due to renal transplant. Ms Faith presented to [place] [facility] on 9/7/20 with fevers (up to 105) and found to have a UTI. She has a past medical history significant for  polycystic kidney disease, renal transplant in 1998 followed at [facility], chronic immunosuppression (cyclosporine, mycophenolate, and prednisone), chronic pneumocystis prophylaxis with Bactrim, systolic (ejection fraction 49%) and diastolic congestive heart failure, carotid artery stenosis, cerebral artery aneurysm with surgical repair, atrial fibrillation, hypertension and dyslipidemia   Precautions/Limitations fall precautions   Weight-Bearing Status - LUE full weight-bearing   Weight-Bearing Status - RUE full weight-bearing   Weight-Bearing Status - LLE full weight-bearing   Weight-Bearing Status - RLE full weight-bearing   Heart Disease Risk Factors High blood pressure;Medical history;Age   General Observations Pt supine upon arrival; pt agreeable to OT session   General Info Comments Activity order: up ad dylon prn   Cognitive Status Examination   Orientation orientation to person, place and time   Level of Consciousness alert   Follows Commands (Cognition) WNL   Memory intact   Attention No deficits were identified   Sensory Examination   Sensory Quick Adds No deficits were identified   Pain Assessment   Patient Currently in Pain No   Posture   Posture forward head position;protracted shoulders   Range of Motion (ROM)   ROM Comment B shoulders limited to ~45; otherwise BUE WFL   Strength   Strength Comments pt demonstrated at least 3+/5 BUE strength distal to shoulders   Coordination   Upper Extremity Coordination No deficits were identified   Mobility   Bed Mobility Bed mobility skill: Scooting/Bridging;Bed mobility skill: Sit to supine;Bed mobility skill: Supine to sit   Bed Mobility Skill: Scooting/Bridging   Level of [place]: Scooting/Bridging stand-by assist   Physical Assist/Nonphysical Assist: Scooting/Bridging verbal cues   Bed Mobility Skill: Sit to Supine   Level of [place]: Sit/Supine stand-by assist   Physical Assist/Nonphysical Assist: Sit/Supine supervision   Bed Mobility  Skill: Supine to Sit   Level of [place]: Supine/Sit stand-by assist   Physical Assist/Nonphysical Assist: Supine/Sit supervision   Assistive Device: Supine/Sit bedrail   Transfer Skill: Sit to Stand   Level of [place]: Sit/Stand stand-by assist   Physical Assist/Nonphysical Assist: Sit/Stand supervision   Transfer Skill: Sit to Stand full weight-bearing   Assistive Device for Transfer: Sit/Stand standard walker   Transfer Skill: Toilet Transfer   Level of [place]: Toilet moderate assist (50% patients effort)   Physical Assist/Nonphysical Assist: Toilet 1 person assist;verbal cues;set-up required   Weight-Bearing Restrictions: Toilet full weight-bearing   Assistive Device standard walker;grab bars   Toilet Transfer Skill Comments Pt has raised toilet at home   Tub/Shower Transfer   Tub/Shower Transfer Comments Pt has shower seat   Balance   Balance Comments Good seated; good ambulating in room with 2WW, but pt c/o mild dizziness throughout session   Lower Body Dressing   Level of [place]: Dress Lower Body moderate assist (50% patients effort)   Physical Assist/Nonphysical Assist: Dress Lower Body 1 person assist   Toileting   Level of [place]: Toilet independent   Grooming   Level of [place]: Grooming stand-by assist   Physical Assist/Nonphysical Assist: Grooming supervision   Instrumental Activities of Daily Living (IADL)   Previous Responsibilities meal prep;housekeeping;laundry;shopping;medication management;driving   IADL Comments family can assist more if needed; neighbors bring meals sometimes   Activities of Daily Living Analysis   Impairments Contributing to Impaired Activities of Daily Living balance impaired;ROM decreased;strength decreased   General Therapy Interventions   Planned Therapy Interventions ADL retraining;transfer training;strengthening   Clinical Impression   Criteria for Skilled Therapeutic Interventions Met yes, treatment indicated   OT Diagnosis impaired ADLs  "  Influenced by the following impairments weakness, decreased balance, decreased ROM   Assessment of Occupational Performance 3-5 Performance Deficits   Identified Performance Deficits dressing, bathing, toileting, home chores   Clinical Decision Making (Complexity) Moderate complexity   Therapy Frequency Daily   Predicted Duration of Therapy Intervention (days/wks) 5 days   Anticipated Discharge Disposition Transitional Care Facility;Home with Assist   Risks and Benefits of Treatment have been explained. Yes   Patient, Family & other staff in agreement with plan of care Yes   [facility] AM-PAC  \"6 Clicks\" Daily Activity Inpatient Short Form   1. Putting on and taking off regular lower body clothing? 3 - A Little   2. Bathing (including washing, rinsing, drying)? 3 - A Little   3. Toileting, which includes using toilet, bedpan or urinal? 3 - A Little   4. Putting on and taking off regular upper body clothing? 3 - A Little   5. Taking care of personal grooming such as brushing teeth? 3 - A Little   6. Eating meals? 4 - None   Daily Activity Raw Score (Score out of 24.Lower scores equate to lower levels of function) 19   Total Evaluation Time   Total Evaluation Time (Minutes) 8     "

## 2020-09-09 NOTE — PLAN OF CARE
5A:     Discharge Planner PT   Patient plan for discharge: Home with family assist  Current status: Evaluation completed, treatment initiated. Pt alert and oriented, VSS upon initiation. Pt SBA for bed mobility from supine to sitting EOB. CGA for sit<>stand transfers & transfer to/from commode. Pt ambulated 25' x 2 in room with CGA and FWW, demonstrated slowed gait speed with short step length, no overt LOB identified, limited by fatigue/weakness. Pt instructed in proximal and seated LE strengthening exercises to promote functional mobility.  Barriers to return to prior living situation: Medical status, functional mobility, level of assistance  Recommendations for discharge: TCU if discharged today  Rationale for recommendations: Pt lives alone & is currently below baseline level of function, would benefit from rehab stay to improve LE strength and functional mobility in order to return home safely. Pt currently declining TCU stay, may be appropriate for home w/assist pending IP progress.        Entered by: Deny Krishnamurthy 09/09/2020 9:29 AM

## 2020-09-09 NOTE — PROGRESS NOTES
Notified by KULDIP Urrutia at [facility] that pt COVID swab from 9/7 resulted as NOT DETECTED. Oncoming nurse mad aware.

## 2020-09-09 NOTE — PROGRESS NOTES
09/09/20 0801   Quick Adds   Type of Visit Initial PT Evaluation   Living Environment   Lives With alone   Living Arrangements house   Home Accessibility stairs to enter home   Number of Stairs, Main Entrance 3   Stair Railings, Main Entrance railings on both sides of stairs   Transportation Anticipated car, drives self   Living Environment Comment Pt reports living alone in house, all needs met on main floor. Has 3 children that live near by that are able to assist when needed.   Self-Care   Usual Activity Tolerance moderate   Current Activity Tolerance fair   Regular Exercise Yes   Activity/Exercise Type other (see comments)  (Uses LE ergometer daily)   Exercise Amount/Frequency daily   Equipment Currently Used at Home shower chair;walker, standard;grab bar, toilet;grab bar, tub/shower   Activity/Exercise/Self-Care Comment Pt reports being MOD I for all daily needs/cares at baseline. Reports her children help her with garbage management and any any lifting around the house.   Functional Level Prior   Ambulation 1-->assistive equipment   Transferring 1-->assistive equipment   Toileting 1-->assistive equipment   Bathing 1-->assistive equipment   Communication 0-->understands/communicates without difficulty   Swallowing 0-->swallows foods/liquids without difficulty   Cognition 0 - no cognition issues reported   Fall history within last six months no   Which of the above functional risks had a recent onset or change? ambulation;transferring   Prior Functional Level Comment Pt reports using FWW for daily mobility within the home. Reports having supportive neighbors and children that assist her when needed.    General Information   Onset of Illness/Injury or Date of Surgery - Date 09/08/20   Referring Physician Abby Will MD    Patient/Family Goals Statement Improve LE strength   Pertinent History of Current Problem (include personal factors and/or comorbidities that impact the POC) Per chart: Meliza Zaidi  Yessy is a 84 year old female admitted on 9/8/2020. She presents as a direct admit from [facility] due to renal transplant. Ms Faith presented to [place] [facility] on 9/7/20 with fevers (up to 105) and found to have a UTI. She has a past medical history significant for polycystic kidney disease, renal transplant in 1998 followed at [facility], chronic immunosuppression (cyclosporine, mycophenolate, and prednisone), chronic pneumocystis prophylaxis with Bactrim, systolic (ejection fraction 49%) and diastolic congestive heart failure, carotid artery stenosis, cerebral artery aneurysm with surgical repair, atrial fibrillation, hypertension and dyslipidemia   Precautions/Limitations fall precautions   General Observations PIV, hearing aid   General Info Comments Activity: Up ad dylon   Cognitive Status Examination   Orientation orientation to person, place and time   Level of Consciousness alert   Follows Commands and Answers Questions 100% of the time   Personal Safety and Judgment intact   Memory intact   Pain Assessment   Patient Currently in Pain No   Integumentary/Edema   Integumentary/Edema no deficits were identifed   Posture    Posture Forward head position;Protracted shoulders   Range of Motion (ROM)   ROM Comment WFL   Strength   Strength Comments BLE strength 3+/5 per observation   Bed Mobility   Bed Mobility Comments SBA for supine<>sitting EOB   Transfer Skills   Transfer Comments CGA for sit<>stand   Gait   Gait Comments CGA for ambulation w/FWW   Sensory Examination   Sensory Perception no deficits were identified   General Therapy Interventions   Planned Therapy Interventions balance training;gait training;strengthening;progressive activity/exercise;home program guidelines   Clinical Impression   Criteria for Skilled Therapeutic Intervention yes, treatment indicated   PT Diagnosis Impaired functional mobilty   Influenced by the following impairments weakness, fatigue   Functional limitations due to  "impairments Reduced IND in gait/transfers   Clinical Presentation Stable/Uncomplicated   Clinical Presentation Rationale Pt presentation, clinical experience   Clinical Decision Making (Complexity) Low complexity   Therapy Frequency 5x/week   Predicted Duration of Therapy Intervention (days/wks) 1 week   Anticipated Discharge Disposition Transitional Care Facility   Risk & Benefits of therapy have been explained Yes   Patient, Family & other staff in agreement with plan of care Yes   Clinical Impression Comments Evaluation completed, treatment initiated   [facility] TM \"6 Clicks\"   2016, Trustees of [facility], under license to [employer].  All rights reserved.   6 Clicks Short Forms Basic Mobility Inpatient Short Form   [facility] AM-PAC  \"6 Clicks\" V.2 Basic Mobility Inpatient Short Form   1. Turning from your back to your side while in a flat bed without using bedrails? 4 - None   2. Moving from lying on your back to sitting on the side of a flat bed without using bedrails? 4 - None   3. Moving to and from a bed to a chair (including a wheelchair)? 3 - A Little   4. Standing up from a chair using your arms (e.g., wheelchair, or bedside chair)? 3 - A Little   5. To walk in hospital room? 3 - A Little   6. Climbing 3-5 steps with a railing? 2 - A Lot   Basic Mobility Raw Score (Score out of 24.Lower scores equate to lower levels of function) 19   Total Evaluation Time   Total Evaluation Time (Minutes) 10     "

## 2020-09-09 NOTE — PLAN OF CARE
Time  7741-8020    Pt alert and oriented x4 but forgetful at times. Pt is hard of hearing. VSS on RA except mild htn up to 158/74. 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  Pt did spike a temp of 101.8 this afternoon. MD notified. IV abx switched from Zosyn to ertapenem and Vanco and blood cultures were drawn. Pt is intermittently incontinent of urine and the urine is quite cloudy. U/A collected, cultures still pending. Pt has a poor appetite on regular diet. Pt is up with AO1, gait belt to bedside commode. Bilateral leg wounds are scabbed over and open to air. Bilateral bruising is present over IT on buttocks. MD paged for WOC consult.  Lawton Allie called this evening to inform RN of negative COVID result. Oncoming nurse notified. Plan for continued IV abx,and monitoring of cultures.

## 2020-09-09 NOTE — PROGRESS NOTES
Brief Care Coordination Note    Per team, patient will probably discharge on IV AB. Referral made to [facility] who will complete benefit check and discuss choice with patient.    Addendum: Per [facility] pt does not have coverage for IV ABX in the home. OOP cost for supplies and  IV erta Q24 is $105.73 daily  And for IV vanco q24 is $51.72    Team updated with costs, [facility] will approach patient with possible options for IV AB.     Aleida Higgins RN, [place]  Float Care Coordinator  Pager: [phone]

## 2020-09-09 NOTE — PROGRESS NOTES
Brief Medicine Note    Contacted by nursing regarding negative COVID swab from [facility]. Patient recently diagnosed with COVID 19 pneumonia on 8/15. No longer having any respiratory symptoms, denying any SOB, or cough. Moderately immunocompromised with renal transplant. No tachypnea or hypoxia. Patient is >20 days from positive swab and patient had a recent negative COVID swab on 8/25. Patient is febrile, but likely due to UTI. Will remove COVID precautions.       Kayla Burton PA-C  Hospitalist Service  Pager [phone]

## 2020-09-09 NOTE — PROGRESS NOTES
"                              Internal Medicine Progress Note - Gold Service  Meliza Krishnamurthy MRN: [id number]  Age: 84 year old, : 1935  Date: 2020         Interval History:     No acute events overnight. Continues to be febrile.   Doing well this AM. Continues to feel fatigued overall with poor appetite. Had 1100 ml urine retained.     Last 24 hr care team notes reviewed.     ROS:  4 point ROS including Respiratory, CV, GI and , is negative other than above.     PHYSICAL EXAM    Vital signs:  Temp: 100.2  F (37.9  C) Temp src: Oral BP: (!) 157/79 Pulse: 74   Resp: 16 SpO2: 95 % O2 Device: None (Room air) Oxygen Delivery: 1 LPM Height: 162.6 cm (5' 4\") Weight: 51.3 kg (113 lb 1.6 oz)  Estimated body mass index is 19.41 kg/m  as calculated from the following:    Height as of this encounter: 1.626 m (5' 4\").    Weight as of this encounter: 51.3 kg (113 lb 1.6 oz).      Intake/Output Summary (Last 24 hours) at 2020 08  Last data filed at 2020  Gross per 24 hour   Intake 480 ml   Output 225 ml   Net 255 ml     GEN: NAD, frail looking lady, pleasant and cooperative , AAox3  HEENT: NC/AT , pale conjunctiva, anicteric sclera, EOMI, PERRL, MMM  Neck: trachea midline, no lymphadenopathy, JVD ~8   CV: RRR, nl S1/S2 no mumurs  PULM: symmetric chest rise, no accessory muscle use   Abdomen: soft, non tender/distented , no HSM, mesh palpabtable   EXTREMITIES: warm, no pedal edema  SKIN: few LE ulcers with scabs and surrounding erythema   NEURO: MS: AAOx3 - no focal deficit   Psycho: good mood     DIAGNOSTIC STUDIES  I reviewed all medications, laboratory results, and imaging over the past 24 hours. Notable for;   ROUTINE LABS:   Cr 0.83  WBC 6.2  Hgb 9.7  ---  INR 1.68   ---  Iron sat 9%    MICROBIOLOGY  OSH   Blood culture  (OSH): E.faecalis 1/3 bottles - Klebsiella 2/3 bottles   Urine  (OSH): pseudomonas   -----  Blood culture : NGTD  Urine culture : pseudomonas "     IMAGING  Renal US:  1.  Mild pelvocaliectasis in the setting of overdistended bladder,  otherwise normal transplant kidney ultrasound. No perinephric abscess  2.  Mucosal thickening of the urinary bladder with echogenic particles  in the urine is consistent with the patient's history of UTI.      Assessment and Plan:     Date of admission: 9/8/2020    Meliza Krishnamurthy is a 84 year old female admitted on 9/8/2020. She presents as a direct admit from [facility] due to renal transplant. Ms Faith presented to [place] ER on 9/7/20 with fevers (up to 105) and found to have a UTI. She has a past medical history significant for polycystic kidney disease, renal transplant in 1998 followed at [facility], chronic immunosuppression (cyclosporine, mycophenolate, and prednisone), chronic pneumocystis prophylaxis with Bactrim, systolic (ejection fraction 49%) and diastolic congestive heart failure, carotid artery stenosis, cerebral artery aneurysm with surgical repair, atrial fibrillation, hypertension and dyslipidemia  She was recently hospitalized at [place] with COVID-19 related pneumonia.       PLAN:  Today:  - Iron studies ---> start venofer   - PT to see   - Follow up [place] culture results  ---> Broaden to Meropenem   - Stop Amlodipine/Pravastatin   - Straight catheterization     # Klebsiella and Enterococcus blood stream infection possibly from a urinary source   # Recent Klebsiella bacteremia treated with oral B-lactam   Presented with sepsis. Blood culture from OSH growing Enterococcus and Klebsiella in 1/3 bottles. Most likely from a urinary source based on UA however can not rule out other source for GPC (skin translocation, has a surgical mesh). Repeat cultures here with NGTD from blood and pseudomonas from urine.     - Broaden to Meropenem eand Vancomycin   - Will consult ID in the AM once culture results finalized to evaluate for source     #PCKD S/p renal transplant 1998   - Renal transplant  consulted appreciate inpute  - Renal US with no perinephric abscess   *IS:   - cyclosporin 25 mg twice daily  - mycophenolate 250 twice daily  - prednisone 5 mg daily  *PPX:   - Bactrim prophylaxis     # HFrEF 2/2 ICM vs NICM   #Multivalvular pathology (AI,MR,TR)  Last TTE on 6/24/2020 shows EF of 49% inferior wall motion abnormality. No ischemic work up on file. Will defer for now and will likely need cardiology follow up outpatient.   - AP: none   - BB: metoprolol  - Statin: on ezetimibe   - SCD PPX: EF>35%   - Diuretics: hold home lasix for now     # Atrial fibrillation  - RC: metoprolol 12.5 mg twice daily  - RyC: amiodarone 200 mg daily   - AC: Warfarin goal 2-3     # HTN  # HDL   as above.     # Iron deficiency and chronic disease Anemia  Start IV iron in the AM    Other notable conditions:  # LE discoloration and scattered abrasions  # Cerebral artery aneurysm s/p surgical repair: neurologically intact      Diet: Reguar  DVT Prophylaxis: Warfarin  Sams Catheter: not present  Code Status: full code      Consulting Services:   Plan for ID in the AM   Transplant nephrology     Disposition Plan   Expected discharge: 4 - 7 days, recommended to transitional care unit once antibiotic plan established.    Lines:  PIVs    Patient care plan discussed beside with patient, RN and patient's family .     Avery Vora MD  Internal Medicine Hospitalist & Staff Physician  [facility]  Pager: [phone]    Team: Brendan Mann   Page Cross Cover between 5pm-8am: pager [phone] (Johnathan), if no response then page job code [phone].

## 2020-09-10 ENCOUNTER — APPOINTMENT (OUTPATIENT)
Dept: GENERAL RADIOLOGY | Facility: CLINIC | Age: 85
DRG: 698 | End: 2020-09-10
Attending: STUDENT IN AN ORGANIZED HEALTH CARE EDUCATION/TRAINING PROGRAM
Payer: MEDICARE

## 2020-09-10 ENCOUNTER — APPOINTMENT (OUTPATIENT)
Dept: PHYSICAL THERAPY | Facility: CLINIC | Age: 85
DRG: 698 | End: 2020-09-10
Attending: INTERNAL MEDICINE
Payer: MEDICARE

## 2020-09-10 LAB
ALBUMIN SERPL-MCNC: 2.3 G/DL (ref 3.4–5)
ALP SERPL-CCNC: 83 U/L (ref 40–150)
ALT SERPL W P-5'-P-CCNC: 52 U/L (ref 0–50)
ANION GAP SERPL CALCULATED.3IONS-SCNC: 7 MMOL/L (ref 3–14)
AST SERPL W P-5'-P-CCNC: 40 U/L (ref 0–45)
BILIRUB SERPL-MCNC: 0.7 MG/DL (ref 0.2–1.3)
BUN SERPL-MCNC: 14 MG/DL (ref 7–30)
CALCIUM SERPL-MCNC: 8.4 MG/DL (ref 8.5–10.1)
CHLORIDE SERPL-SCNC: 104 MMOL/L (ref 94–109)
CO2 SERPL-SCNC: 23 MMOL/L (ref 20–32)
CREAT SERPL-MCNC: 0.78 MG/DL (ref 0.52–1.04)
ERYTHROCYTE [DISTWIDTH] IN BLOOD BY AUTOMATED COUNT: 17 % (ref 10–15)
GFR SERPL CREATININE-BSD FRML MDRD: 70 ML/MIN/{1.73_M2}
GLUCOSE SERPL-MCNC: 75 MG/DL (ref 70–99)
HCT VFR BLD AUTO: 32.4 % (ref 35–47)
HGB BLD-MCNC: 10.3 G/DL (ref 11.7–15.7)
INR PPP: 1.92 (ref 0.86–1.14)
MCH RBC QN AUTO: 29.8 PG (ref 26.5–33)
MCHC RBC AUTO-ENTMCNC: 31.8 G/DL (ref 31.5–36.5)
MCV RBC AUTO: 94 FL (ref 78–100)
PLATELET # BLD AUTO: 183 10E9/L (ref 150–450)
POTASSIUM SERPL-SCNC: 3.9 MMOL/L (ref 3.4–5.3)
PROT SERPL-MCNC: 5 G/DL (ref 6.8–8.8)
RBC # BLD AUTO: 3.46 10E12/L (ref 3.8–5.2)
SODIUM SERPL-SCNC: 134 MMOL/L (ref 133–144)
WBC # BLD AUTO: 5.4 10E9/L (ref 4–11)

## 2020-09-10 PROCEDURE — 40000141 ZZH STATISTIC PERIPHERAL IV START W/O US GUIDANCE

## 2020-09-10 PROCEDURE — 99207 ZZC CDG-CUT & PASTE-POTENTIAL IMPACT ON LEVEL: CPT | Performed by: STUDENT IN AN ORGANIZED HEALTH CARE EDUCATION/TRAINING PROGRAM

## 2020-09-10 PROCEDURE — 25000132 ZZH RX MED GY IP 250 OP 250 PS 637: Mod: GY | Performed by: NURSE PRACTITIONER

## 2020-09-10 PROCEDURE — 85027 COMPLETE CBC AUTOMATED: CPT | Performed by: NURSE PRACTITIONER

## 2020-09-10 PROCEDURE — 97530 THERAPEUTIC ACTIVITIES: CPT | Mod: GP

## 2020-09-10 PROCEDURE — 12000001 ZZH R&B MED SURG/OB UMMC

## 2020-09-10 PROCEDURE — 36569 INSJ PICC 5 YR+ W/O IMAGING: CPT

## 2020-09-10 PROCEDURE — 25000132 ZZH RX MED GY IP 250 OP 250 PS 637: Mod: GY | Performed by: STUDENT IN AN ORGANIZED HEALTH CARE EDUCATION/TRAINING PROGRAM

## 2020-09-10 PROCEDURE — 97116 GAIT TRAINING THERAPY: CPT | Mod: GP

## 2020-09-10 PROCEDURE — 25000131 ZZH RX MED GY IP 250 OP 636 PS 637: Mod: GY | Performed by: NURSE PRACTITIONER

## 2020-09-10 PROCEDURE — 36415 COLL VENOUS BLD VENIPUNCTURE: CPT | Performed by: NURSE PRACTITIONER

## 2020-09-10 PROCEDURE — 40000986 XR CHEST PORT 1 VW

## 2020-09-10 PROCEDURE — 25000128 H RX IP 250 OP 636: Performed by: STUDENT IN AN ORGANIZED HEALTH CARE EDUCATION/TRAINING PROGRAM

## 2020-09-10 PROCEDURE — 80053 COMPREHEN METABOLIC PANEL: CPT | Performed by: NURSE PRACTITIONER

## 2020-09-10 PROCEDURE — 25000125 ZZHC RX 250: Performed by: NURSE PRACTITIONER

## 2020-09-10 PROCEDURE — 85610 PROTHROMBIN TIME: CPT | Performed by: NURSE PRACTITIONER

## 2020-09-10 PROCEDURE — 27211389 ZZ H KIT, 5 FR DL BIOFLO OPEN ENDED PICC

## 2020-09-10 PROCEDURE — 25800030 ZZH RX IP 258 OP 636: Performed by: STUDENT IN AN ORGANIZED HEALTH CARE EDUCATION/TRAINING PROGRAM

## 2020-09-10 PROCEDURE — 99233 SBSQ HOSP IP/OBS HIGH 50: CPT | Performed by: STUDENT IN AN ORGANIZED HEALTH CARE EDUCATION/TRAINING PROGRAM

## 2020-09-10 RX ORDER — FUROSEMIDE 10 MG/ML
20 INJECTION INTRAMUSCULAR; INTRAVENOUS ONCE
Status: DISCONTINUED | OUTPATIENT
Start: 2020-09-10 | End: 2020-09-10

## 2020-09-10 RX ORDER — HEPARIN SODIUM,PORCINE 10 UNIT/ML
5-10 VIAL (ML) INTRAVENOUS EVERY 24 HOURS
Status: DISCONTINUED | OUTPATIENT
Start: 2020-09-10 | End: 2020-09-12 | Stop reason: HOSPADM

## 2020-09-10 RX ORDER — LIDOCAINE 40 MG/G
CREAM TOPICAL
Status: DISCONTINUED | OUTPATIENT
Start: 2020-09-10 | End: 2020-09-10

## 2020-09-10 RX ORDER — AMOXICILLIN 250 MG
1 CAPSULE ORAL 2 TIMES DAILY
Status: DISCONTINUED | OUTPATIENT
Start: 2020-09-10 | End: 2020-09-12 | Stop reason: HOSPADM

## 2020-09-10 RX ORDER — WARFARIN SODIUM 2.5 MG/1
2.5 TABLET ORAL
Status: COMPLETED | OUTPATIENT
Start: 2020-09-10 | End: 2020-09-10

## 2020-09-10 RX ORDER — FUROSEMIDE 10 MG/ML
20 INJECTION INTRAMUSCULAR; INTRAVENOUS ONCE
Status: COMPLETED | OUTPATIENT
Start: 2020-09-10 | End: 2020-09-10

## 2020-09-10 RX ORDER — HEPARIN SODIUM,PORCINE 10 UNIT/ML
5-10 VIAL (ML) INTRAVENOUS
Status: DISCONTINUED | OUTPATIENT
Start: 2020-09-10 | End: 2020-09-12 | Stop reason: HOSPADM

## 2020-09-10 RX ORDER — HEPARIN SODIUM,PORCINE 10 UNIT/ML
2-5 VIAL (ML) INTRAVENOUS
Status: DISCONTINUED | OUTPATIENT
Start: 2020-09-10 | End: 2020-09-12 | Stop reason: HOSPADM

## 2020-09-10 RX ORDER — FUROSEMIDE 10 MG/ML
40 INJECTION INTRAMUSCULAR; INTRAVENOUS ONCE
Status: COMPLETED | OUTPATIENT
Start: 2020-09-10 | End: 2020-09-10

## 2020-09-10 RX ORDER — POLYETHYLENE GLYCOL 3350 17 G/17G
17 POWDER, FOR SOLUTION ORAL DAILY
Status: DISCONTINUED | OUTPATIENT
Start: 2020-09-10 | End: 2020-09-12 | Stop reason: HOSPADM

## 2020-09-10 RX ADMIN — WARFARIN SODIUM 2.5 MG: 2.5 TABLET ORAL at 17:56

## 2020-09-10 RX ADMIN — IRON SUCROSE 200 MG: 20 INJECTION, SOLUTION INTRAVENOUS at 11:15

## 2020-09-10 RX ADMIN — POLYETHYLENE GLYCOL 3350 17 G: 17 POWDER, FOR SOLUTION ORAL at 11:15

## 2020-09-10 RX ADMIN — POTASSIUM CHLORIDE 20 MEQ: 750 TABLET, EXTENDED RELEASE ORAL at 08:18

## 2020-09-10 RX ADMIN — DOCUSATE SODIUM 50 MG AND SENNOSIDES 8.6 MG 1 TABLET: 8.6; 5 TABLET, FILM COATED ORAL at 11:15

## 2020-09-10 RX ADMIN — FUROSEMIDE 20 MG: 10 INJECTION, SOLUTION INTRAVENOUS at 15:20

## 2020-09-10 RX ADMIN — Medication 12.5 MG: at 20:56

## 2020-09-10 RX ADMIN — CEFEPIME HYDROCHLORIDE 2 G: 2 INJECTION, POWDER, FOR SOLUTION INTRAVENOUS at 09:29

## 2020-09-10 RX ADMIN — VITAMIN D, TAB 1000IU (100/BT) 1000 UNITS: 25 TAB at 22:06

## 2020-09-10 RX ADMIN — CEFEPIME HYDROCHLORIDE 2 G: 2 INJECTION, POWDER, FOR SOLUTION INTRAVENOUS at 00:00

## 2020-09-10 RX ADMIN — FUROSEMIDE 40 MG: 10 INJECTION, SOLUTION INTRAVENOUS at 11:15

## 2020-09-10 RX ADMIN — SULFAMETHOXAZOLE AND TRIMETHOPRIM 1 TABLET: 400; 80 TABLET ORAL at 08:17

## 2020-09-10 RX ADMIN — MYCOPHENOLATE MOFETIL 250 MG: 250 CAPSULE ORAL at 08:18

## 2020-09-10 RX ADMIN — Medication 5 ML: at 22:07

## 2020-09-10 RX ADMIN — Medication 5 MG: at 22:06

## 2020-09-10 RX ADMIN — CYCLOSPORINE 25 MG: 25 CAPSULE, LIQUID FILLED ORAL at 08:18

## 2020-09-10 RX ADMIN — Medication 12.5 MG: at 08:17

## 2020-09-10 RX ADMIN — VANCOMYCIN HYDROCHLORIDE 1000 MG: 1 INJECTION, SOLUTION INTRAVENOUS at 17:08

## 2020-09-10 RX ADMIN — FAMOTIDINE 20 MG: 20 TABLET ORAL at 08:17

## 2020-09-10 RX ADMIN — MYCOPHENOLATE MOFETIL 250 MG: 250 CAPSULE ORAL at 17:56

## 2020-09-10 RX ADMIN — AMIODARONE HYDROCHLORIDE 200 MG: 200 TABLET ORAL at 08:18

## 2020-09-10 RX ADMIN — MEROPENEM 1 G: 1 INJECTION, POWDER, FOR SOLUTION INTRAVENOUS at 00:16

## 2020-09-10 RX ADMIN — PREDNISONE 5 MG: 5 TABLET ORAL at 08:18

## 2020-09-10 RX ADMIN — CYCLOSPORINE 25 MG: 25 CAPSULE, LIQUID FILLED ORAL at 20:56

## 2020-09-10 RX ADMIN — FERROUS SULFATE TAB 325 MG (65 MG ELEMENTAL FE) 324 MG: 325 (65 FE) TAB at 08:17

## 2020-09-10 RX ADMIN — LIDOCAINE HYDROCHLORIDE 1 ML: 20 INJECTION, SOLUTION INFILTRATION; PERINEURAL at 19:34

## 2020-09-10 ASSESSMENT — ACTIVITIES OF DAILY LIVING (ADL)
ADLS_ACUITY_SCORE: 21

## 2020-09-10 NOTE — PLAN OF CARE
Time  9859-0651    Pt alert and oriented x4, VSS on RA except mild htn up to 150/76. Tmax of 99.4 this shift. Pt denies pain and nausea. Pt broadened to Meropenem and Vanc given + blood cultures from OSH. Urine culture also growing pseudomonas. Pt had renal ultra sound today which showed no abscess, though bladder was distended. Pt was subsequently straight cathed for 1300 mL of urine. Pt did have a continent and incontinent episode of urine earlier in the day (prior to straight cathing). Will continue to bladder scan PRN. WOC consulted for bruising over IT bands. No new orders as it is not felt to be pressure realted. Mepilex applied to scabbing wounds on L leg. Pt placed on pulsate. Pt is up with assist of 1 to bedside commode. Appetite is fair on regular diet. Plan is for continued IV abx, IV iron infusion tomorrow AM, and continued monitoring of cultures.

## 2020-09-10 NOTE — PROGRESS NOTES
"                              Internal Medicine Progress Note - Gold Service  Meliza Krishnamurthy MRN: [id number]  Age: 84 year old, : 1935  Date: September 10, 2020         Interval History:     No acute events overnight. Ongoing urinary retention overnight.   Doing well this AM feels she has more energy.     Last 24 hr care team notes reviewed.     ROS:  4 point ROS including Respiratory, CV, GI and , is negative other than above.     PHYSICAL EXAM    Vital signs:  Temp: 97  F (36.1  C) Temp src: Oral BP: 138/75 Pulse: 59   Resp: 16 SpO2: 99 % O2 Device: None (Room air) Oxygen Delivery: 1 LPM Height: 162.6 cm (5' 4\") Weight: 51.3 kg (113 lb 1.6 oz)  Estimated body mass index is 19.41 kg/m  as calculated from the following:    Height as of this encounter: 1.626 m (5' 4\").    Weight as of this encounter: 51.3 kg (113 lb 1.6 oz).      Intake/Output Summary (Last 24 hours) at 2020 0821  Last data filed at 2020  Gross per 24 hour   Intake 480 ml   Output 225 ml   Net 255 ml     GEN: NAD, frail looking lady, pleasant and cooperative , AAox3  HEENT: NC/AT , pale conjunctiva, anicteric sclera, EOMI, PERRL, MMM  Neck: trachea midline, no lymphadenopathy, JVD ~8   CV: RRR, nl S1/S2 no mumurs  PULM: symmetric chest rise, no accessory muscle use   Abdomen: soft, non tender/distented , no HSM, mesh palpabtable   EXTREMITIES: warm, no pedal edema  SKIN: few LE ulcers with scabs and surrounding erythema   NEURO: MS: AAOx3 - no focal deficit   Psycho: good mood     DIAGNOSTIC STUDIES  I reviewed all medications, laboratory results, and imaging over the past 24 hours. Notable for;   ROUTINE LABS:   Na 134  --  WBC 5.4   Hgb 10.3   ---  Iron sat 9%    MICROBIOLOGY  OSH   Blood culture  (OSH): E.faecalis 1/3 bottles - Klebsiella 2/3 bottles   Urine  (OSH): pseudomonas   -----  Blood culture : NGTD  Urine culture : pseudomonas , Bari I - Zosyn S - Ceftaz S     IMAGING  Renal US:  1.  Mild " pelvocaliectasis in the setting of overdistended bladder,  otherwise normal transplant kidney ultrasound. No perinephric abscess  2.  Mucosal thickening of the urinary bladder with echogenic particles  in the urine is consistent with the patient's history of UTI.      Assessment and Plan:     Date of admission: 9/8/2020    Meliza Krishnamurthy is a 84 year old female admitted on 9/8/2020. She presents as a direct admit from [facility] due to renal transplant. Ms Faith presented to [place] ER on 9/7/20 with fevers (up to 105) and found to have a UTI. She has a past medical history significant for polycystic kidney disease, renal transplant in 1998 followed at [facility], chronic immunosuppression (cyclosporine, mycophenolate, and prednisone), chronic pneumocystis prophylaxis with Bactrim, systolic (ejection fraction 49%) and diastolic congestive heart failure, carotid artery stenosis, cerebral artery aneurysm with surgical repair, atrial fibrillation, hypertension and dyslipidemia  She was recently hospitalized at [place] with COVID-19 related pneumonia.       PLAN:  Today:  - Start venofer   - ID Consult placed   - transition back to cefepime   - Diurese with IV lasix 40/20 mg   - will contact urology in the AM     # Klebsiella and Enterococcus blood stream infection possibly from a urinary source   # Recent Klebsiella bacteremia treated with oral B-lactam   Presented with sepsis. Blood culture from OSH growing Enterococcus and Klebsiella in 1/3 bottles. Most likely from a urinary source based on UA however can not rule out other source for GPC (skin translocation, has a surgical mesh). Repeat cultures here with NGTD from blood and pseudomonas from urine.     - Broaden to Meropenem eand Vancomycin ---> transition to Cefepime and vancomycin based on S  - ID consulted appreciate input --> no further imaging for source     #PCKD S/p renal transplant 1998   - Renal transplant consulted appreciate inpute  -  Renal US with no perinephric abscess   *IS:   - cyclosporin 25 mg twice daily  - mycophenolate 250 twice daily  - prednisone 5 mg daily  *PPX:   - Bactrim prophylaxis     # HFrEF 2/2 ICM vs NICM   #Multivalvular pathology (AI,MR,TR)  Last TTE on 6/24/2020 shows EF of 49% inferior wall motion abnormality. No ischemic work up on file. Will defer for now and will likely need cardiology follow up outpatient.   - AP: none   - BB: metoprolol  - Statin: on ezetimibe   - SCD PPX: EF>35%   - Diuretics: hold home lasix for now     # Atrial fibrillation  - RC: metoprolol 12.5 mg twice daily  - RyC: amiodarone 200 mg daily   - AC: Warfarin goal 2-3     # HTN  # HDL   as above.     # Iron deficiency and chronic disease Anemia  Start IV iron in the AM    Other notable conditions:  # LE discoloration and scattered abrasions  # Cerebral artery aneurysm s/p surgical repair: neurologically intact      Diet: Reguar  DVT Prophylaxis: Warfarin  Sams Catheter: not present  Code Status: full code      Consulting Services:   Plan for ID in the AM   Transplant nephrology     Disposition Plan   Expected discharge: 4 - 7 days, recommended to transitional care unit once antibiotic plan established.    Lines:  PIVs    Patient care plan discussed beside with patient, RN and patient's family .     Avery Vora MD  Internal Medicine Hospitalist & Staff Physician  [facility]  Pager: [phone]    Team: Brendan Mann   Page Cross Cover between 5pm-8am: pager [phone] (Johnathan), if no response then page job code [phone].

## 2020-09-10 NOTE — CONSULTS
SOLID ORGAN INFECTIOUS DISEASES CONSULTATION     Patient:  Meliza Krishnamurthy   YOB: 1935  Date of Visit:  09/10/2020  Date of Admission: 9/8/2020  Consult Requester:Abby Will MD          Assessment and Recommendations:   Recommendations:  - Continue cefepime and IV Vanco to cover organisms isolated in blood and urine.  - Anticipate she may need a prolonged duration of IV antibiotic therapy to treat bacteremia and to cover for potential transplant pyelonephritis.   - Called lab to add on Bactrim sensitivities for Pseudomonas. Will continues to follow for results.    Thank you for the consult. Transplant ID will continue to follow with you.     Iesha Kan PA-C   Infectious Diseases  Pager: [phone]  09/10/2020    Assessment:  Meliza Krishnamurthy is a 84 year old female with PMH of polycystic kidney disease s/p right renal transplant with no h/o rejection (on cyclosporine, MMF, pred 5), HFrEF, Afib, CAD, and recent COVID-19 infection who presented to the [facility] ED on 9/7 with fever and dizziness.    # K. pneumoniae, E. faecalis bacteremia  # h/o Klebsiella bacteremia  Initially detected during [place] admission with 1/3 BC from 8/16 positive on Day 2. Sensitivities show it was pan-sensitive. She was started on meropenem 8/18, then changed to Zosyn 8/20, and discharged with cefdinir with end date of 8/31/20. Patient reports she completed this course of antibiotics, however, an oral cephalosporin may not have been adequate to treat bacteremia and she now has recurrent or persistent Klebsiella bacteremia from [facility] BC. Neither Klebsiella nor E faecalis have high resistance patterns on susceptibilities. For now would continue cefepime and Vancomycin.    # Pseudomonas UTI/bacteruia  Patient reports she is usually able to tell when she has a UTI as her urine becomes more cloudy. She denies recent changes to the appearance of her urine as well as no dysuria, urgency, or increased  frequency. She does have dirty appearing urine collected here and at OSH. UC from OSH is growing Pseudomonas as is the UC collected on admission. Would continue cefepime to cover Pseudmonas for now.    # Kidney transplant, right (1998)  # Polycystic kidney disease  No h/o rejection. Currently on cyclosporine, MMF, and prednisone 5 mg daily.    # Intermittently prolonged QTc  Most recently EKG shows QTc of 515. Avoid QTc prolonging medications when possible.    Previous ID Issues:  - COVID-19 infection. Tested positive 8/15/20, admitted to [place] 8/16/20-8/21/20. Received dexamethasone daily and remdesivir 8/18-8/21.  - Leg wound. Patient reports she developed leg wound while potting plants early this summer. Wound is improving slowly over the last few months. 7/7/20 leg wound culture grew K pneumoniae (R to ampicillin and Bactrim). She received a 5 day course of doxycycline. She has previously had Pseudomonas cultured from contralateral leg 6/28/2019.  - Bacteruria/UTI. Has multiple urine cultures with mainly E coli and Klebsiella over last 4 years.    Other ID issues:  - QTc interval:  515msec 9/8  - Bacterial prophylaxis:  none needed  - Pneumocystis prophylaxis:  Bactrim  - Viral serostatus & prophylaxis:  CMV D+  - Fungal prophylaxis:  none needed  - Immunization status:  Up to date  - Gamma globulin status:  no IGG level in our EMR  - Isolation status: none           History of Present Illness:   Transplants:  1/6/1998 (Kidney), Postoperative day:  8283     Meliza Krishnamurthy is a 84 year old female with PMH of polycystic kidney disease s/p right renal transplant with no h/o rejection (on cyclosporine, MMF, pred 5), HFrEF, Afib, CAD, and recent COVID-19 infection who presented to the [facility] ED on 9/7 with fever and dizziness.    UA revealed large leukocyte esterase with 51-11 WBC, 3-10 RBC, and bacteria. UC is growing Pseudomonas. Of note, she has had 1 other recent epiodes of bacteruria on 7/1/2020,  which was cultured as Klebsiella pneumoniae (R to ampicillin, Bactrim and I to macrobid). She received Keflex for 5 days. BC were also collected and are growing E faecalis and Klebsiella. She received Zosyn in the ED and was then transferred to [facility] for further management.     Here UA showed large LE, >182 WBC, 22 RBC and few bacteria. UC grew Pseudomonas. BC were collected and thus far are without growth. Antibiotics were transitioned to ertapenem and Vancomycin. CXR showed streaky perihilar and bibasilar opacities. US renal was completed and did not show evidence of perinephric abscess.     Meliza reports feeling dizzy and weak on Monday. She called her daughter-in-law to take her to the ED. She denies having fevers at home. Per chart notes she apparently had a fever of up to 105, and a Tmax in our hospital of 101.8. She has been afebrile since . She denies noticing cloudy urine recently.           Review of Systems:   10 point review of systems was negative except for noted as above in HPI.         Past Medical History:     Past Medical History:   Diagnosis Date     -donor kidney transplant     one rejection      Polycystic kidney disease      S/P cerebral aneurysm repair      S/P hip replacement 2010    multiple revisions     Skin cancer of face             Past Surgical History:   Per review of CareEverywhere:    Renal transplant in  for which she is followed at [facility]  Cerebral artery aneurysm with surgical repair  Left total hip arthroplasty  Right hip ORIF  Left total shoulder arthroplasty  Appendectomy  Cholecystectomy  Cataract repair  Vaginal hysterectomy  Amputation of toes.           Family History:   Per review of CareEverywhere:  Ovarian cancer  Colorectal cancer  Breast cancer  Polycystic kidney disease  Prostate cancer           Social History:     Social History     Tobacco Use     Smoking status: Never Smoker     Smokeless tobacco: Never Used   Substance  Use Topics     Alcohol use: Yes     Comment: occ wine     History   Sexual Activity     Sexual activity: Not on file            Current Medications:       amiodarone  200 mg Oral Daily     ceFEPIme (MAXIPIME) IV  2 g Intravenous Q12H     cycloSPORINE modified  25 mg Oral BID     ezetimibe  5 mg Oral At Bedtime     famotidine  20 mg Oral Daily     ferrous sulfate  324 mg Oral Daily     [Held by provider] furosemide  40 mg Oral Daily     iron sucrose (VENOFER) intermittent infusion (200 mg)  200 mg Intravenous Q24H     metoprolol tartrate  12.5 mg Oral BID     mycophenolate  250 mg Oral BID IS     potassium chloride ER  20 mEq Oral Daily     predniSONE  5 mg Oral Daily     sodium chloride (PF)  3 mL Intracatheter Q8H     sulfamethoxazole-trimethoprim  1 tablet Oral Daily     vancomycin (VANCOCIN) IV  1,000 mg Intravenous Q24H     Vitamin D3  1,000 Units Oral At Bedtime     warfarin ANTICOAGULANT  2.5 mg Oral ONCE at 18:00            Allergies:   No Known Allergies         Physical Exam:   Vitals were reviewed  Temp:  [97  F (36.1  C)-99.4  F (37.4  C)] 97  F (36.1  C)  Pulse:  [59-75] 59  Resp:  [16] 16  BP: (122-150)/(61-76) 138/75  SpO2:  [96 %-99 %] 99 %    Vitals:    09/08/20 0330   Weight: 51.3 kg (113 lb 1.6 oz)       Constitutional: Elderly adult female seen sitting in chair, in NAD. Awake, alert, interactive.  HEENT: NC/AT, EOMI, sclera clear, conjunctiva normal, OP with MMM  Respiratory: No increased work of breathing, CTAB, no crackles or wheezing.  Cardiovascular: RRR, no murmur noted. No peripheral edema.  GI: Normal bowel sounds, soft, non-distended and non-tender.  Skin: Warm, dry. Appears thin with multiple scattered bruises on extremities. 3 large mepilex bandages on LLE.  Musculoskeletal: Extremities grossly normal, non-tender, no edema.   Neurologic: A&O. Answers questions appropriately, speech normal. Moves all extremities spontaneously.  Neuropsychiatric: Calm. Affect appropriate to  situation.           Laboratory Data:     Microbiology:  Culture Micro   Date Value Ref Range Status   09/08/2020 No growth after 2 days  Preliminary   09/08/2020 No growth after 2 days  Preliminary   09/08/2020 >100,000 colonies/mL  Pseudomonas aeruginosa   (A)  Final     Blood Culture 8/16  Klebsiella pneumoniae        Urine Culture 7/1/2020  Klebsiella pneumoniae        Metabolic Studies       Recent Labs   Lab Test 09/10/20  0650 09/09/20  2247 09/09/20  0616 09/08/20  0452 12/31/19  1101 10/28/16  1453    132* 133 134 139 138   POTASSIUM 3.9 3.9 3.4 3.9 3.8 4.8   CHLORIDE 104 102 102 102 108 104   CO2 23 22 22 24 26 28   ANIONGAP 7 8 8 7 5 6   BUN 14 16 14 16 16 19   CR 0.78 0.78 0.83 0.83 0.76 0.67   GFRESTIMATED 70 70 64 64 71 84   GLC 75 125* 84 80 71 98   FARHAT 8.4* 8.3* 8.3* 8.7 9.6 9.5   MAG  --   --  1.9  --   --   --        Hepatic Studies    Recent Labs   Lab Test 09/10/20  0650   BILITOTAL 0.7   ALKPHOS 83   ALBUMIN 2.3*   AST 40   ALT 52*       Hematology Studies      Recent Labs   Lab Test 09/10/20  0650 09/09/20  0616 09/08/20  0452 10/28/16  1453   WBC 5.4 6.2 8.1 5.8   HGB 10.3* 9.8* 11.3* 12.1   HCT 32.4* 30.8* 36.2 37.9    162 153 273       Urine Studies     Recent Labs   Lab Test 09/08/20  1142   URINEPH 6.5   NITRITE Negative   LEUKEST Large*   WBCU >182*            Imaging:   EXAMINATION: US RENAL TRANSPLANT,  9/9/2020 10:53 AM                                 IMPRESSION:   1.  Mild pelvocaliectasis in the setting of overdistended bladder,  otherwise normal transplant kidney ultrasound. No perinephric abscess  2.  Mucosal thickening of the urinary bladder with echogenic particles  in the urine is consistent with the patient's history of UTI.    Exam: XR CHEST PORT 1 VW, 9/8/2020 11:18 AM                                         Impression: Streaky perihilar and bibasilar opacities may represent  atelectasis/scarring in the setting of low lung volumes, though  infection is not  excluded.

## 2020-09-10 NOTE — PROGRESS NOTES
[facility], [place]   Transplant Nephrology Progress Note  Date of Admission:  9/8/2020  Today's Date: 09/10/2020    Recommendations:  -Agree with broadening to vanc/rosa isela given pseudomonas in the urine cx here. Awaiting sensitivities for here and OSH to determine antibiotic plan.   -Restart lasix 40mg PO daily  -Ok to hold amlodipine given controlled BP  -recommend urology consult for urinary retention and recommend starting scheduled straight cath q8h as this is the likely etiology of pyelonephritis  -She likely will need to go home on CIC if she can do this    Assessment & Plan      # DDKT: stable, TAZ on presentation to OSH w/ Scr of 1, now 0.8              - Baseline Cr ~ 0.8              - Proteinuria: Normal (<0.2 grams)              - Date DSA Last Checked: Oct/2018      Latest DSA: Significant DSA  (>3000 mfi) to multiple class I antigens               - BK Viremia: No              - Kidney Tx Biopsy: No        # Immunosuppression: Cyclosporine (goal 50-75), Mycophenolate mofetil (dose 500mg in AM/250mg in PM) and Prednisone (dose 5 mg daily)              - Changes: No     # Infection Prophylaxis:   - PJP: Sulfa/TMP (Bactrim)     # Hypertension: Uncontrolled;   Goal BP: < 130/80              - Volume status: Euvolemic                  - Changes: Yes - Restart lasix 40mg PO daily today. Hold amlodipine due to controlled BP on metoprolol 12.5mg PO BID        # Fever in immunocompromised patient:              -Follow-up blood cultures, urine cultures here and OSH.    -UCx and BlCx per primary team at OSH w/ klebsiella and enterococcus. UCx here with pseudomonas              -Agree with switch to meropenem and vancomycin for now              -renal transplant ultrasound on 9/9 negative for perinephric abscess.    -She has urinary retention with 1.3L straight cath yesterday and 300cc straight cath. Recommend urology consult and scheduling straight cath q8h. She may need to perform CIC  at home.      #Hyponatremia:              -stable after IVF. Stop IVF       # Anemia:   -Evidence of iron deficiency with Tsat 9%. Given that she is on IV abx ok to give IV iron today     # Transplant History:  Etiology of Kidney Failure: PKD  Tx: DDKT  Transplant: 1998 (Kidney)  Donor Type: Donation after Brain Death        Donor Class: Standard Criteria Donor     Recommendations were communicated to the primary team via this note.        Brett Henderson MD  Pager: [phone]    Interval History   -Pseudomonas found in urine. Changed erta to rosa isela  -1.3L and 300mL straight caths yesterday    Review of Systems   4 point ROS was obtained and negative except as noted in the Interval History.    MEDICATIONS:    amiodarone  200 mg Oral Daily     ceFEPIme (MAXIPIME) IV  2 g Intravenous Q12H     cycloSPORINE modified  25 mg Oral BID     ezetimibe  5 mg Oral At Bedtime     famotidine  20 mg Oral Daily     ferrous sulfate  324 mg Oral Daily     furosemide  20 mg Intravenous Once     furosemide  40 mg Intravenous Once     [Held by provider] furosemide  40 mg Oral Daily     iron sucrose (VENOFER) intermittent infusion (200 mg)  200 mg Intravenous Q24H     metoprolol tartrate  12.5 mg Oral BID     mycophenolate  250 mg Oral BID IS     polyethylene glycol  17 g Oral Daily     potassium chloride ER  20 mEq Oral Daily     predniSONE  5 mg Oral Daily     senna-docusate  1 tablet Oral BID     sodium chloride (PF)  3 mL Intracatheter Q8H     sulfamethoxazole-trimethoprim  1 tablet Oral Daily     vancomycin (VANCOCIN) IV  1,000 mg Intravenous Q24H     Vitamin D3  1,000 Units Oral At Bedtime     warfarin ANTICOAGULANT  2.5 mg Oral ONCE at 18:00       Warfarin Therapy Reminder         Physical Exam   Temp  Av.8  F (37.7  C)  Min: 98.6  F (37  C)  Max: 101.8  F (38.8  C)      Pulse  Av  Min: 65  Max: 74 Resp  Av.3  Min: 16  Max: 18  SpO2  Av %  Min: 86 %  Max: 99 %     /75 (BP Location: Right arm)   Pulse 59    "Temp 97  F (36.1  C) (Oral)   Resp 16   Ht 1.626 m (5' 4\")   Wt 51.3 kg (113 lb 1.6 oz)   SpO2 99%   BMI 19.41 kg/m      Admit Weight: 51.3 kg (113 lb 1.6 oz)     GENERAL APPEARANCE: alert and no distress  HENT: mouth without ulcers or lesions  LYMPHATICS: no cervical or supraclavicular nodes  RESP: lungs clear to auscultation - no rales, rhonchi or wheezes  CV: regular rhythm, normal rate, no rub, no murmur  EDEMA: no LE edema bilaterally  ABDOMEN: soft, nondistended, nontender, bowel sounds normal  MS: extremities normal - no gross deformities noted, no evidence of inflammation in joints, no muscle tenderness  SKIN: no rash    Data   All labs reviewed by me.  CMP  Recent Labs   Lab 09/10/20  0650 09/09/20 2247 09/09/20 0616 09/08/20  0452    132* 133 134   POTASSIUM 3.9 3.9 3.4 3.9   CHLORIDE 104 102 102 102   CO2 23 22 22 24   ANIONGAP 7 8 8 7   GLC 75 125* 84 80   BUN 14 16 14 16   CR 0.78 0.78 0.83 0.83   GFRESTIMATED 70 70 64 64   GFRESTBLACK 81 81 75 75   FARHAT 8.4* 8.3* 8.3* 8.7   MAG  --   --  1.9  --    PROTTOTAL 5.0*  --   --   --    ALBUMIN 2.3*  --   --   --    BILITOTAL 0.7  --   --   --    ALKPHOS 83  --   --   --    AST 40  --   --   --    ALT 52*  --   --   --      CBC  Recent Labs   Lab 09/10/20  0650 09/09/20  0616 09/08/20  0452   HGB 10.3* 9.8* 11.3*   WBC 5.4 6.2 8.1   RBC 3.46* 3.34* 3.83   HCT 32.4* 30.8* 36.2   MCV 94 92 95   MCH 29.8 29.3 29.5   MCHC 31.8 31.8 31.2*   RDW 17.0* 17.2* 17.6*    162 153     INR  Recent Labs   Lab 09/10/20  0650 09/09/20  0616 09/08/20  0452   INR 1.92* 1.68* 1.53*     ABGNo lab results found in last 7 days.   Urine Studies  Recent Labs   Lab Test 09/08/20  1142   COLOR Yellow   APPEARANCE Slightly Cloudy   URINEGLC Negative   URINEBILI Negative   URINEKETONE Negative   SG 1.015   UBLD Small*   URINEPH 6.5   PROTEIN 30*   NITRITE Negative   LEUKEST Large*   RBCU 22*   WBCU >182*     Recent Labs   Lab Test 10/28/16  1453   UTPG 0.39* "     PTH  No lab results found.  Iron Studies  Recent Labs   Lab Test 09/09/20  0616 10/30/18  1508   IRON 16* 41   * 435*   IRONSAT 9* 10*       IMAGING:  All imaging studies reviewed by me.    Renal txp U/S 9/9: normal but with mild pelviectasis in setting of distended bladder. Echogenic material in bladder.

## 2020-09-10 NOTE — PROGRESS NOTES
Brief Medicine Note    Contacted by nursing regarding patient having a bladder scan for 267cc and wondering if this meets threshold for straight cath. Discussed no indication for straigh cath at this time and to encourage urination and straigh cath if PVR >350cc. Of note, she had a straigh cath for 1300cc today and has had 1500cc UOP today. BMP obtained to ensure Creatinine is stable given no urination since last straigh cath and Creatinine stable. Is eating and drinking adequate fluids per RN. Discussed with RN to encourage fluids and continue to monitor. Please notify medicine provider on call overnight with any additional questions or concerns.     Veronica Solomon PA-C  Hospitalist Service  Pager [phone]

## 2020-09-10 NOTE — PROGRESS NOTES
Social Work: Assessment with Discharge Plan    Patient Name:  Raudel Suazo  :  1935  Age:  84 year old  MRN:  [id number]  Completed assessment with:  Patient, pt's sister Nevaeh    Presenting Information   Reason for Referral:  Discharge plan  Date of Intake:  September 10, 2020  Referral Source:  Physician  Decision Maker:  Patient  Alternate Decision Maker:  Per [facility] policy, pt's children  Health Care Directive:  None on file  Living Situation:  House  Previous Functional Status:  Independent  Patient and family understanding of hospitalization:  Not discussed  Cultural/Language/Spiritual Considerations:  None identified  Adjustment to Illness:  Appropriate    Physical Health  Reason for Admission:  No diagnosis found.  Services Needed/Recommended:  TCU    Mental Health/Chemical Dependency  Diagnosis:  None identified  Support/Services in Place:  N/A  Services Needed/Recommended:  N/A    Support System  Significant relationship at present time:  None identified  Family of origin is available for support:  Yes, children live nearby, sister at bedside (lives in [place])  Other support available:  Pt notes friends/neighbors who are supportive  Gaps in support system:  None identified  Patient is caregiver to:  None     Provider Information   Primary Care Physician:  Handy Carrillo   [phone]   Clinic:  [address]  / [facility]      :  N/A    Financial   Income Source:  Not discussed  Financial Concerns:  None identified  Insurance:    Payor/Plan Subscriber Name Rel Member # Group #   MEDICARE - MEDICARE RAUDEL SUAZO [facility] [id number]       ATTN CLAIMS, [address]   BCBS - [place] RAUDEL SUAZO  [id number]      [address] [address]       Discharge Plan   Patient and family discharge goal:  TCU/swing bed  Provided education on discharge plan:  YES  Patient agreeable to discharge plan:  YES  A list of Medicare Certified Facilities with  associated star ratings was provided to the patient and/or family to encourage patient choice. Patient's choices for facility are:  Pt requesting to d/c to [facility] for rehab- unclear if hospital has separate rehab center or if this will be a swing bed  Will NH provide Skilled rehabilitation or complex medical:  YES  General information regarding anticipated insurance coverage and possible out of pocket cost was discussed. Patient and patient's family are aware patient may incur the cost of transportation to the facility, pending insurance payment: YES  Barriers to discharge:  Medical stability    Discharge Recommendations   Anticipated Disposition:  Facility:  TCU vs swing bed  Transportation Needs:  Pt states family or friends can transport her at d/c  Name of Transportation Company and Phone:  N/A    Additional comments   Pt is an 84-year-old female transferred from [facility] admitted to [facility] 9/8/20 with UTI. TCU is recommended at discharge. Pt will require IV abx at discharge- awaiting final abx plan.     Pt still listed as on COVID precautions in Epic system- discussed with bedside RN and confirmed pt is not actually on COVID precautions, had negative test from [facility] 9/7. Met with pt and pt's sister Nevaeh at bedside. Discussed recommendation for TCU placement. Pt agreeable with this. Pt states she would like to go to [facility] in [place]. She states the hospital has an attached rehab center. Rehab center not appearing on Medicare.gov list, discussed that this may be a swing bed and will look into this.    Called [facility] (ph [phone]).  states they have swing beds. Was transferred to a  [facility] and left  inquiring about their rehab program/s and requesting fax number for referral.    ARINA Leon, [facility]    [facility]  Pager [phone]  Phone [phone]    Addendum 11:10am:  Received call from Swetha with [facility], she states they have a swing bed unit attached to the hospital and can review referral. Faxed referral to [phone]. Updated pt at bedside.    Addendum 2:30pm: Received call from Swetha with [facility]. She states pt looks acceptable clinically, but waiting on final ID plan to ensure they can accommodate the IV abx. Per primary team pt will be medically ready for d/c tomorrow. Swetha states admissions are full tomorrow but they can look at admitting pt this weekend or Monday.    Addendum 2:38pm: Faxed today's ID note to [facility].    Addendum 4:01pm: Received VM from Swetha with [facility] swing bed- pt accepted, likely for Saturday.

## 2020-09-10 NOTE — PLAN OF CARE
Discharge Planner PT / 5A   Patient plan for discharge: Rehab.  Current status: Pt completes supine > sit with CGA and use of bedrail. Pt transfers sit <> stand from EOB/chair with CGA. Pt ambulates ~150ft with FWW and CGA - demonstrates decreased gait speed with small shuffled steps. VSS on RA.  Barriers to return to prior living situation: Medical status, current mobility / level of A, lives alone, [place] home.  Recommendations for discharge: TCU.  Rationale for recommendations: Pt below functional baseline and live alone. Pt will require continued skilled therapy to maximize strength, balance, activity tolerance, and mobility safety/IND.

## 2020-09-10 NOTE — PROVIDER NOTIFICATION
Informed PA [place] of bladder scan of 267 ml at HS. No complaints of bladder distention. Told to hold off with straight cath unless bladder scan is 350 ml. STAT blood draw for BMP was ordered

## 2020-09-10 NOTE — PLAN OF CARE
5A OT: Cancel     First attempt pt declined in anticipation for lunch. Second attempt with PT. Will cancel today and reschedule for tomorrow.

## 2020-09-10 NOTE — PLAN OF CARE
Alert and oriented x 4. Complained of back pain with relief from Tylenol. Up with assist of 1. No voluntary voiding noted this shift. Denies feeling distended and denies urge to void. ALEXANDER Solomon informed. Bladder scanned at midnight with 400 ml result. Straight cathed with 300 ml output. Text paged [employer].  IV antibiotic given overnight. Turned and repositioned in bed. On continuous tele monitor. Will continue to monitor and follow plan of care.

## 2020-09-11 ENCOUNTER — APPOINTMENT (OUTPATIENT)
Dept: OCCUPATIONAL THERAPY | Facility: CLINIC | Age: 85
DRG: 698 | End: 2020-09-11
Attending: INTERNAL MEDICINE
Payer: MEDICARE

## 2020-09-11 ENCOUNTER — APPOINTMENT (OUTPATIENT)
Dept: PHYSICAL THERAPY | Facility: CLINIC | Age: 85
DRG: 698 | End: 2020-09-11
Attending: INTERNAL MEDICINE
Payer: MEDICARE

## 2020-09-11 LAB
ANION GAP SERPL CALCULATED.3IONS-SCNC: 7 MMOL/L (ref 3–14)
BACTERIA SPEC CULT: ABNORMAL
BUN SERPL-MCNC: 14 MG/DL (ref 7–30)
CALCIUM SERPL-MCNC: 8.4 MG/DL (ref 8.5–10.1)
CHLORIDE SERPL-SCNC: 104 MMOL/L (ref 94–109)
CO2 SERPL-SCNC: 23 MMOL/L (ref 20–32)
CREAT SERPL-MCNC: 0.76 MG/DL (ref 0.52–1.04)
ERYTHROCYTE [DISTWIDTH] IN BLOOD BY AUTOMATED COUNT: 16.7 % (ref 10–15)
GFR SERPL CREATININE-BSD FRML MDRD: 71 ML/MIN/{1.73_M2}
GLUCOSE SERPL-MCNC: 76 MG/DL (ref 70–99)
HCT VFR BLD AUTO: 32.5 % (ref 35–47)
HGB BLD-MCNC: 10.5 G/DL (ref 11.7–15.7)
INR PPP: 1.95 (ref 0.86–1.14)
Lab: ABNORMAL
MAGNESIUM SERPL-MCNC: 1.9 MG/DL (ref 1.6–2.3)
MCH RBC QN AUTO: 29.3 PG (ref 26.5–33)
MCHC RBC AUTO-ENTMCNC: 32.3 G/DL (ref 31.5–36.5)
MCV RBC AUTO: 91 FL (ref 78–100)
PLATELET # BLD AUTO: 234 10E9/L (ref 150–450)
POTASSIUM SERPL-SCNC: 3.4 MMOL/L (ref 3.4–5.3)
RBC # BLD AUTO: 3.58 10E12/L (ref 3.8–5.2)
SODIUM SERPL-SCNC: 134 MMOL/L (ref 133–144)
SPECIMEN SOURCE: ABNORMAL
VANCOMYCIN SERPL-MCNC: 11.4 MG/L
WBC # BLD AUTO: 4 10E9/L (ref 4–11)

## 2020-09-11 PROCEDURE — 36592 COLLECT BLOOD FROM PICC: CPT | Performed by: STUDENT IN AN ORGANIZED HEALTH CARE EDUCATION/TRAINING PROGRAM

## 2020-09-11 PROCEDURE — 25800030 ZZH RX IP 258 OP 636: Performed by: STUDENT IN AN ORGANIZED HEALTH CARE EDUCATION/TRAINING PROGRAM

## 2020-09-11 PROCEDURE — G0463 HOSPITAL OUTPT CLINIC VISIT: HCPCS | Mod: 25

## 2020-09-11 PROCEDURE — 36415 COLL VENOUS BLD VENIPUNCTURE: CPT | Performed by: NURSE PRACTITIONER

## 2020-09-11 PROCEDURE — 25000131 ZZH RX MED GY IP 250 OP 636 PS 637: Mod: GY | Performed by: NURSE PRACTITIONER

## 2020-09-11 PROCEDURE — 25000132 ZZH RX MED GY IP 250 OP 250 PS 637: Mod: GY | Performed by: NURSE PRACTITIONER

## 2020-09-11 PROCEDURE — 99233 SBSQ HOSP IP/OBS HIGH 50: CPT | Performed by: STUDENT IN AN ORGANIZED HEALTH CARE EDUCATION/TRAINING PROGRAM

## 2020-09-11 PROCEDURE — 85027 COMPLETE CBC AUTOMATED: CPT | Performed by: NURSE PRACTITIONER

## 2020-09-11 PROCEDURE — 80048 BASIC METABOLIC PNL TOTAL CA: CPT | Performed by: NURSE PRACTITIONER

## 2020-09-11 PROCEDURE — 97530 THERAPEUTIC ACTIVITIES: CPT | Mod: GO

## 2020-09-11 PROCEDURE — 99207 ZZC CDG-CUT & PASTE-POTENTIAL IMPACT ON LEVEL: CPT | Performed by: STUDENT IN AN ORGANIZED HEALTH CARE EDUCATION/TRAINING PROGRAM

## 2020-09-11 PROCEDURE — 25000128 H RX IP 250 OP 636: Performed by: STUDENT IN AN ORGANIZED HEALTH CARE EDUCATION/TRAINING PROGRAM

## 2020-09-11 PROCEDURE — 85610 PROTHROMBIN TIME: CPT | Performed by: NURSE PRACTITIONER

## 2020-09-11 PROCEDURE — 83735 ASSAY OF MAGNESIUM: CPT | Performed by: NURSE PRACTITIONER

## 2020-09-11 PROCEDURE — 25000132 ZZH RX MED GY IP 250 OP 250 PS 637: Mod: GY | Performed by: STUDENT IN AN ORGANIZED HEALTH CARE EDUCATION/TRAINING PROGRAM

## 2020-09-11 PROCEDURE — 12000001 ZZH R&B MED SURG/OB UMMC

## 2020-09-11 PROCEDURE — 97116 GAIT TRAINING THERAPY: CPT | Mod: GP

## 2020-09-11 PROCEDURE — 97110 THERAPEUTIC EXERCISES: CPT | Mod: GO

## 2020-09-11 PROCEDURE — 97530 THERAPEUTIC ACTIVITIES: CPT | Mod: GP

## 2020-09-11 PROCEDURE — 80202 ASSAY OF VANCOMYCIN: CPT | Performed by: STUDENT IN AN ORGANIZED HEALTH CARE EDUCATION/TRAINING PROGRAM

## 2020-09-11 PROCEDURE — 97602 WOUND(S) CARE NON-SELECTIVE: CPT

## 2020-09-11 RX ORDER — MAGNESIUM SULFATE HEPTAHYDRATE 40 MG/ML
2 INJECTION, SOLUTION INTRAVENOUS ONCE
Status: COMPLETED | OUTPATIENT
Start: 2020-09-11 | End: 2020-09-11

## 2020-09-11 RX ORDER — FUROSEMIDE 10 MG/ML
40 INJECTION INTRAMUSCULAR; INTRAVENOUS ONCE
Status: COMPLETED | OUTPATIENT
Start: 2020-09-11 | End: 2020-09-11

## 2020-09-11 RX ORDER — WARFARIN SODIUM 3 MG/1
3 TABLET ORAL
Status: COMPLETED | OUTPATIENT
Start: 2020-09-11 | End: 2020-09-11

## 2020-09-11 RX ADMIN — POLYETHYLENE GLYCOL 3350 17 G: 17 POWDER, FOR SOLUTION ORAL at 09:21

## 2020-09-11 RX ADMIN — PREDNISONE 5 MG: 5 TABLET ORAL at 09:14

## 2020-09-11 RX ADMIN — POTASSIUM CHLORIDE 20 MEQ: 750 TABLET, EXTENDED RELEASE ORAL at 09:13

## 2020-09-11 RX ADMIN — VANCOMYCIN HYDROCHLORIDE 1250 MG: 10 INJECTION, POWDER, LYOPHILIZED, FOR SOLUTION INTRAVENOUS at 22:09

## 2020-09-11 RX ADMIN — CYCLOSPORINE 25 MG: 25 CAPSULE, LIQUID FILLED ORAL at 20:35

## 2020-09-11 RX ADMIN — SULFAMETHOXAZOLE AND TRIMETHOPRIM 1 TABLET: 400; 80 TABLET ORAL at 09:13

## 2020-09-11 RX ADMIN — WARFARIN SODIUM 3 MG: 3 TABLET ORAL at 20:36

## 2020-09-11 RX ADMIN — Medication 12.5 MG: at 09:14

## 2020-09-11 RX ADMIN — FUROSEMIDE 40 MG: 10 INJECTION, SOLUTION INTRAVENOUS at 11:08

## 2020-09-11 RX ADMIN — FAMOTIDINE 20 MG: 20 TABLET ORAL at 09:14

## 2020-09-11 RX ADMIN — CYCLOSPORINE 25 MG: 25 CAPSULE, LIQUID FILLED ORAL at 09:14

## 2020-09-11 RX ADMIN — FERROUS SULFATE TAB 325 MG (65 MG ELEMENTAL FE) 325 MG: 325 (65 FE) TAB at 09:13

## 2020-09-11 RX ADMIN — VITAMIN D, TAB 1000IU (100/BT) 1000 UNITS: 25 TAB at 22:10

## 2020-09-11 RX ADMIN — MAGNESIUM SULFATE IN WATER 2 G: 40 INJECTION, SOLUTION INTRAVENOUS at 11:08

## 2020-09-11 RX ADMIN — Medication 12.5 MG: at 20:35

## 2020-09-11 RX ADMIN — MYCOPHENOLATE MOFETIL 250 MG: 250 CAPSULE ORAL at 18:50

## 2020-09-11 RX ADMIN — DOCUSATE SODIUM 50 MG AND SENNOSIDES 8.6 MG 1 TABLET: 8.6; 5 TABLET, FILM COATED ORAL at 20:35

## 2020-09-11 RX ADMIN — IRON SUCROSE 200 MG: 20 INJECTION, SOLUTION INTRAVENOUS at 09:11

## 2020-09-11 RX ADMIN — MYCOPHENOLATE MOFETIL 250 MG: 250 CAPSULE ORAL at 09:13

## 2020-09-11 RX ADMIN — Medication 5 ML: at 20:36

## 2020-09-11 RX ADMIN — CEFEPIME HYDROCHLORIDE 2 G: 2 INJECTION, POWDER, FOR SOLUTION INTRAVENOUS at 13:06

## 2020-09-11 RX ADMIN — AMIODARONE HYDROCHLORIDE 200 MG: 200 TABLET ORAL at 09:13

## 2020-09-11 RX ADMIN — Medication 5 MG: at 22:09

## 2020-09-11 ASSESSMENT — ACTIVITIES OF DAILY LIVING (ADL)
ADLS_ACUITY_SCORE: 21
ADLS_ACUITY_SCORE: 22
ADLS_ACUITY_SCORE: 21

## 2020-09-11 NOTE — PROCEDURES
[facility], [place]    Double Lumen PICC Placement    Date/Time: 9/10/2020 8:16 PM  Performed by: Ethel Oconnell RN  Authorized by: Abby Will MD   Indications: Antibiotics.    UNIVERSAL PROTOCOL   Site Marked: Yes  Prior Images Obtained and Reviewed:  Yes  Required items: Required blood products, implants, devices and special equipment available    Patient identity confirmed:  Verbally with patient and arm band  NA - No sedation, light sedation, or local anesthesia  Confirmation Checklist:  Patient's identity using two indicators, relevant allergies, procedure was appropriate and matched the consent or emergent situation and correct equipment/implants were available  Time out: Immediately prior to the procedure a time out was called    Universal Protocol: the Joint Commission Universal Protocol was followed    Preparation: Patient was prepped and draped in usual sterile fashion           ANESTHESIA    Local Anesthetic: Lidocaine 1% without epinephrine  Anesthetic Total (mL):  2      SEDATION    Patient Sedated: No        Preparation: skin prepped with ChloraPrep  Skin prep agent: skin prep agent completely dried prior to procedure  Sterile barriers: maximum sterile barriers were used: cap, mask, sterile gown, sterile gloves, and large sterile sheet  Hand hygiene: hand hygiene performed prior to central venous catheter insertion  Type of line used: Power PICC  Catheter type: double lumen  Catheter size: 5 Fr  Brand: Bard  Placement method: ultrasound, MST, venipuncture and tip confirmation system  Number of attempts: 1  Successful placement: yes  Orientation: right  Location: basilic vein (Vein Diameter 0.56)  Arm circumference: adults 10 cm  Extremity circumference: 27  Visible catheter length: 3  Total catheter length: 39  Dressing and securement: blood removed, blood cleaned with CHG, chlorhexidine disc applied, occlusive dressing applied, statlock and dressing  applied  Post procedure assessment: blood return through all ports and no pneumothorax on x-ray  PROCEDURE   Patient Tolerance:  Patient tolerated the procedure well with no immediate complications

## 2020-09-11 NOTE — PROGRESS NOTES
[facility], [place]   Transplant Nephrology Progress Note  Date of Admission:  9/8/2020  Today's Date: 09/11/2020    Recommendations:  -please obtain urology consult for urinary retention and recommend starting scheduled straight cath q8h as this is the likely etiology of pyelonephritis  -She likely will need to go home on CIC if she can do this   -Agree with transition to vanc/cefepime with noted sensitivities     Assessment & Plan      # DDKT: stable, TAZ on presentation to OSH w/ Scr of 1, now 0.8              - Baseline Cr ~ 0.8              - Proteinuria: Normal (<0.2 grams)              - Date DSA Last Checked: Oct/2018      Latest DSA: Significant DSA  (>3000 mfi) to multiple class I antigens               - BK Viremia: No              - Kidney Tx Biopsy: No        # Immunosuppression: Cyclosporine (goal 50-75), Mycophenolate mofetil (dose 500mg in AM/250mg in PM) and Prednisone (dose 5 mg daily)              - Changes: No     # Infection Prophylaxis:   - PJP: Sulfa/TMP (Bactrim)     # Hypertension: controlled;   Goal BP: < 130/80              - Volume status: Euvolemic                  - Changes: Yes - Recommend restarting lasix 40mg PO daily today. Hold amlodipine due to controlled BP on metoprolol 12.5mg PO BID        # Fever in immunocompromised patient:              -UCx with pseudomonas and BlCx with enterococcus faecalis and Klebsiella. Currently on vanc/cefepime due to sensitivities. Recommend 14d course. PICC line placed 9/10. ID consulted               -renal transplant ultrasound on 9/9 negative for perinephric abscess.    -Obtain urology consult and schedule straight cath q8h. She may need to  perform CIC at home.      #Hyponatremia:              -stable after IVF. Stopped IVF       # Anemia:   -Evidence of iron deficiency with Tsat 9%. Reciving IV venofer     # Transplant History:  Etiology of Kidney Failure: PKD  Tx: DDKT  Transplant: 1/6/1998 (Kidney)  Donor Type:  "Donation after Brain Death        Donor Class: Standard Criteria Donor     Recommendations were communicated to the primary team via this note.        Brett Henderson MD  Pager: [phone]    Interval History   -Switched to vanc/cefepime for BlCx with klebsiella and enterococcus, UCx with pseudomonas.   -Diuresed yesterday with IV lasix    Review of Systems   4 point ROS was obtained and negative except as noted in the Interval History.    MEDICATIONS:    amiodarone  200 mg Oral Daily     ceFEPIme (MAXIPIME) IV  2 g Intravenous Q12H     cycloSPORINE modified  25 mg Oral BID     ezetimibe  5 mg Oral At Bedtime     famotidine  20 mg Oral Daily     ferrous sulfate  324 mg Oral Daily     furosemide  40 mg Intravenous Once     [Held by provider] furosemide  40 mg Oral Daily     heparin lock flush  5-10 mL Intracatheter Q24H     iron sucrose (VENOFER) intermittent infusion (200 mg)  200 mg Intravenous Q24H     magnesium sulfate  2 g Intravenous Once     metoprolol tartrate  12.5 mg Oral BID     mycophenolate  250 mg Oral BID IS     polyethylene glycol  17 g Oral Daily     potassium chloride ER  20 mEq Oral Daily     predniSONE  5 mg Oral Daily     senna-docusate  1 tablet Oral BID     sodium chloride (PF)  3 mL Intracatheter Q8H     sulfamethoxazole-trimethoprim  1 tablet Oral Daily     vancomycin (VANCOCIN) IV  1,000 mg Intravenous Q24H     Vitamin D3  1,000 Units Oral At Bedtime     warfarin ANTICOAGULANT  3 mg Oral ONCE at 18:00       Warfarin Therapy Reminder         Physical Exam   Temp  Av.8  F (37.7  C)  Min: 98.6  F (37  C)  Max: 101.8  F (38.8  C)      Pulse  Av  Min: 65  Max: 74 Resp  Av.3  Min: 16  Max: 18  SpO2  Av %  Min: 86 %  Max: 99 %     BP (!) 140/76   Pulse 71   Temp 97.5  F (36.4  C) (Oral)   Resp 16   Ht 1.626 m (5' 4\")   Wt 51.3 kg (113 lb 1.6 oz)   SpO2 98%   BMI 19.41 kg/m      Admit Weight: 51.3 kg (113 lb 1.6 oz)     GENERAL APPEARANCE: alert and no distress  HENT: mouth " without ulcers or lesions  LYMPHATICS: no cervical or supraclavicular nodes  RESP: lungs clear to auscultation - no rales, rhonchi or wheezes  CV: regular rhythm, normal rate, no rub, no murmur  EDEMA: no LE edema bilaterally  ABDOMEN: soft, nondistended, nontender, bowel sounds normal  MS: extremities normal - no gross deformities noted, no evidence of inflammation in joints, no muscle tenderness  SKIN: no rash    Data   All labs reviewed by me.  CMP  Recent Labs   Lab 09/11/20  0724 09/10/20  0650 09/09/20 2247 09/09/20 0616    134 132* 133   POTASSIUM 3.4 3.9 3.9 3.4   CHLORIDE 104 104 102 102   CO2 23 23 22 22   ANIONGAP 7 7 8 8   GLC 76 75 125* 84   BUN 14 14 16 14   CR 0.76 0.78 0.78 0.83   GFRESTIMATED 71 70 70 64   GFRESTBLACK 83 81 81 75   FARHAT 8.4* 8.4* 8.3* 8.3*   MAG 1.9  --   --  1.9   PROTTOTAL  --  5.0*  --   --    ALBUMIN  --  2.3*  --   --    BILITOTAL  --  0.7  --   --    ALKPHOS  --  83  --   --    AST  --  40  --   --    ALT  --  52*  --   --      CBC  Recent Labs   Lab 09/11/20  0724 09/10/20  0650 09/09/20 0616 09/08/20  0452   HGB 10.5* 10.3* 9.8* 11.3*   WBC 4.0 5.4 6.2 8.1   RBC 3.58* 3.46* 3.34* 3.83   HCT 32.5* 32.4* 30.8* 36.2   MCV 91 94 92 95   MCH 29.3 29.8 29.3 29.5   MCHC 32.3 31.8 31.8 31.2*   RDW 16.7* 17.0* 17.2* 17.6*    183 162 153     INR  Recent Labs   Lab 09/11/20  0724 09/10/20  0650 09/09/20  0616 09/08/20  0452   INR 1.95* 1.92* 1.68* 1.53*     ABGNo lab results found in last 7 days.   Urine Studies  Recent Labs   Lab Test 09/08/20  1142   COLOR Yellow   APPEARANCE Slightly Cloudy   URINEGLC Negative   URINEBILI Negative   URINEKETONE Negative   SG 1.015   UBLD Small*   URINEPH 6.5   PROTEIN 30*   NITRITE Negative   LEUKEST Large*   RBCU 22*   WBCU >182*     Recent Labs   Lab Test 10/28/16  1453   UTPG 0.39*     PTH  No lab results found.  Iron Studies  Recent Labs   Lab Test 09/09/20  0616 10/30/18  1508   IRON 16* 41   * 435*   IRONSAT 9* 10*        IMAGING:  All imaging studies reviewed by me.    Renal txp U/S 9/9: normal but with mild pelviectasis in setting of distended bladder. Echogenic material in bladder.

## 2020-09-11 NOTE — CONSULTS
WO Nurse Inpatient Wound Assessment   Reason for consultation: Evaluate and treat  Left lower extremity wounds    Assessment  Chronic Left lower extremity wounds originally due to Trauma in an immunosuppressed patient   Status: initial assessment    Treatment Plan  LLE  wounds: Daily    -Cleanse the wound with MicroKlenz moistened gauze, pat dry.  -Apply a nickel size thickness of Medihoney to the wound bases.  Cover the wounds with Primapore dressings     If dressings are dry when removed, Change dressing changed to every other day and as needed       Orders Written  WOC Nurse follow-up plan:weekly  Nursing to notify the Provider(s) and re-consult the WOC Nurse if wound(s) deteriorates or new skin concern.    Patient History  According to provider note(s):  84 year old female admitted on 9/8/2020. She presents as a direct admit from [facility] due to renal transplant. Ms Faith presented to [place] [facility] on 9/7/20 with fevers (up to 105) and found to have a UTI. She has a past medical history significant for polycystic kidney disease, renal transplant in 1998 followed at [facility], chronic immunosuppression (cyclosporine, mycophenolate, and prednisone), chronic pneumocystis prophylaxis with Bactrim, systolic (ejection fraction 49%) and diastolic congestive heart failure, carotid artery stenosis, cerebral artery aneurysm with surgical repair, atrial fibrillation, hypertension and dyslipidemia  She was recently hospitalized at [place] with COVID-19 related pneumonia    Objective Data  Active Diet Order  Orders Placed This Encounter      Combination Diet Regular Diet Adult      Output:   I/O last 3 completed shifts:  In: -   Out: 2025 [Urine:2025]    Risk Assessment:   Sensory Perception: 4-->no impairment  Moisture: 3-->occasionally moist  Activity: 3-->walks occasionally  Mobility: 3-->slightly limited  Nutrition: 3-->adequate  Friction and Shear: 2-->potential problem  Ry Score: 18                    "       Labs:   Recent Labs   Lab 09/11/20  0724 09/10/20  0650   ALBUMIN  --  2.3*   HGB 10.5* 10.3*   INR 1.95* 1.92*   WBC 4.0 5.4       Physical Exam  Areas of skin assessed: focused Left LE    PP normal, no edema.      Wound Location:  Left LE    Anterior        Medial      Lateral    Date of last photo 9/11/20  Wound History: per pt:  Wounds occurred from falling on concrete blocks approximately 2 months ago.  Wounds had not been improved, so referral was made to wound care clinic.  Pt is unsure what dressings they were using. \"I went in every 3 days and they changed it\"      Anterior:  1.4 x 0.8 x 0.2 cm with moist red granular base.  Periwound intact, no erythema, maceration, induration  Medial: 3 x 1.1 x 0.4 cm 100% loose moist yellow fibrinous slough.  periwound intact, slightly purple, no induration or maceration   Lateral:  3.2 x 1.8 x 0.3  95% adherent yellow slough, 5% granulation.  Periwound intact with up to 1 cm of pink erythema.  No maceration.         Palpation of the wound beds: normal      Drainage: scant     Description of drainage: serosanguinous      Temperature: normal   Odor: mild  Pain: during dressing change,     Interventions  Visual inspection and assessment completed   Wound Care Rationale Provide selective debridement (autolysis) of nonviable tissue   Wound Care: done per plan of care  Supplies: gathered  Current support surface: Standard  Atmos Air mattress  Education provided to: plan of care  Discussed plan of care with Patient and Family        "

## 2020-09-11 NOTE — PLAN OF CARE
Time: 9081-4747    Reason for admission: UTI, fever  Vitals: VSS on RA, afebrile   Activity:  Ax1 w/ GB + W  Pain: Denies  Neuro:  A&Ox4, forgetful  Cardiac: No acute issues  Respiratory: No acute issues, denies SOB  GI/:  Urinary retention. Post void residual bladder scan 830 mL, straight cath for 850 mL. Repeat bladder scan overnight 202 mL.   Diet: Regular, boosts between meals, tolerating well, nausea  Lines: R DL PICC. Purple lumen heparin locked. Red lumen infusing TKO between abx.   Skin/Wounds: Leg wound x 3, microklenz and mepilex. WOC consult ordered. Skin tear from removing tegaderm over PIV. Primapore placed over open area. Skin fragile, maroon, dusky.   Labs/imaging: Previous COVID infection, but retested negative. No reswab or isolation required per MD.      New changes this shift: DL PICC placed this evening. Warm packs applied. Pt resting comfortably overnight.     Plan: Possible discharge to [facility] on 9/12 if IV abx plan established.

## 2020-09-11 NOTE — PLAN OF CARE
5A    Discharge Planner PT   Patient plan for discharge: Swing bed  Current status: VSS up initiation on RA. Pt SBA for bed mobility for supine to sitting EOB. CGA for sit<>stand transfers. Pt ambulated with FWW @ CGA for 20' x 2, displayed slow gait speed with short shuffling steps. Pt ascended/descended 4 stairs with BUE railing assist and Min A for stability. Pt demonstrated increased difficulty with control during decent which she attributed to LE weakness.   Barriers to return to prior living situation: Medical status, functional mobility  Recommendations for discharge: TCU  Rationale for recommendations: Pt mobilizing below baseline level of function, would benefit from TCU to improve LE strength and functional mobility.        Entered by: Deny Krishnamurthy 09/11/2020 10:32 AM

## 2020-09-11 NOTE — PROGRESS NOTES
SOLID ORGAN TRANSPLANT INFECTIOUS DISEASES PROGRESS NOTE     Patient:  Meliza Krishnamurthy   YOB: 1935  Date of Visit:  09/11/2020  Date of Admission: 9/8/2020  Consult Requester:Abby Will MD          Assessment and Recommendations:   Recommendations:  - Switch cefepime to ceftazidime 2g q12h (based on CrCl of 44) to cover Klebsiella and Pseudomonas  - Continue IV Vanco to cover Enterococcus  - 3 weeks of IV antibiotic treatment in total with hard end date: 9/8-9/28.   - Remove PICC line once antibiotics are completed on 9/28.  - While on IV antibiotics please check CBC with diff and BMP weekly. Fax results to [phone].    Thank you for the consult. Transplant ID will continue to follow with you.      Iesha Kan PA-C   Infectious Diseases  Pager: [phone]     Assessment:  Meliza Krishnamurthy is a 84 year old female with PMH of polycystic kidney disease s/p right renal transplant with no h/o rejection (on cyclosporine, MMF, pred 5), HFrEF, Afib, CAD, and recent COVID-19 infection who [facility] with fever and dizziness.     # K. pneumoniae, E. faecalis bacteremia  # h/o Klebsiella bacteremia  Initially detected during [place] admission with 1/3 BC from 8/16 positive on Day 2. Sensitivities show it was pan-sensitive. She was started on meropenem 8/18, then changed to Zosyn 8/20, and discharged with cefdinir with end date of 8/31/20. Patient reports she completed this course of antibiotics, however, an oral cephalosporin may not have been adequate to treat bacteremia and she now has recurrent or persistent Klebsiella bacteremia from [facility]. Neither Klebsiella nor E faecalis have high resistance patterns on susceptibilities. For now would continue cefepime and Vancomycin.     # Pseudomonas UTI/bacteruia  Patient reports she is usually able to tell when she has a UTI as her urine becomes more cloudy. She denies recent changes to the appearance of her urine  as well as no dysuria, urgency, or increased frequency. She does have dirty appearing urine collected here and at OSH. UC from OSH is growing Pseudomonas as is the UC collected on admission. Would continue cefepime to cover Pseudmonas for now.     # Kidney transplant, right (1998)  # Polycystic kidney disease  No h/o rejection. Currently on cyclosporine, MMF, and prednisone 5 mg daily.     # Intermittently prolonged QTc  Most recently EKG shows QTc of 515. Avoid QTc prolonging medications when possible.     Previous ID Issues:  - COVID-19 infection. Tested positive 8/15/20, admitted to [facility] 8/16/20-8/21/20. Received dexamethasone daily and remdesivir 8/18-8/21.  - Leg wound. Patient reports she developed leg wound while potting plants early this summer. Wound is improving slowly over the last few months. 7/7/20 leg wound culture grew K pneumoniae (R to ampicillin and Bactrim). She received a 5 day course of doxycycline. She has previously had Pseudomonas cultured from contralateral leg 6/28/2019.  - Bacteruria/UTI. Has multiple urine cultures with mainly E coli and Klebsiella over last 4 years.     Other ID issues:  - QTc interval:  515msec 9/8  - Bacterial prophylaxis:  none needed  - Pneumocystis prophylaxis:  Bactrim  - Viral serostatus & prophylaxis:  CMV D+  - Fungal prophylaxis:  none needed  - Immunization status:  Up to date  - Gamma globulin status:  no IGG level in our EMR  - Isolation status: none           Interval History:   Afebrile. Meliza reports she is doing well. No dysuria. Eating and drinking. She is looking forward to going to rehab close to home.           History of Present Illness:   Adopted from initial consult note:    Transplants:  1/6/1998 (Kidney), Postoperative day:  8284     Meliza Krishnamurthy is a 84 year old female with PMH of polycystic kidney disease s/p right renal transplant with no h/o rejection (on cyclosporine, MMF, pred 5), HFrEF, Afib, CAD, and recent COVID-19 infection who  presented to the [facility] ED on 9/7 with fever and dizziness.     UA revealed large leukocyte esterase with 51-11 WBC, 3-10 RBC, and bacteria. UC is growing Pseudomonas. Of note, she has had 1 other recent epiodes of bacteruria on 7/1/2020, which was cultured as Klebsiella pneumoniae (R to ampicillin, Bactrim and I to macrobid). She received Keflex for 5 days. BC were also collected and are growing E faecalis and Klebsiella. She received Zosyn in the ED and was then transferred to [facility] for further management.      Here UA showed large LE, >182 WBC, 22 RBC and few bacteria. UC grew Pseudomonas. BC were collected and thus far are without growth. Antibiotics were transitioned to ertapenem and Vancomycin. CXR showed streaky perihilar and bibasilar opacities. US renal was completed and did not show evidence of perinephric abscess.      Meliza reports feeling dizzy and weak on Monday. She called her daughter-in-law to take her to the ED. She denies having fevers at home. Per chart notes she apparently had a fever of up to 105, and a Tmax in our hospital of 101.8. She has been afebrile since 9/9. She denies noticing cloudy urine recently.           Review of Systems:   Targeted 5 point review of systems was negative except for noted as above the interval history section.            Current Medications:       amiodarone  200 mg Oral Daily     ceFEPIme (MAXIPIME) IV  2 g Intravenous Q12H     cycloSPORINE modified  25 mg Oral BID     ezetimibe  5 mg Oral At Bedtime     famotidine  20 mg Oral Daily     ferrous sulfate  324 mg Oral Daily     furosemide  40 mg Oral Daily     heparin lock flush  5-10 mL Intracatheter Q24H     iron sucrose (VENOFER) intermittent infusion (200 mg)  200 mg Intravenous Q24H     metoprolol tartrate  12.5 mg Oral BID     mycophenolate  250 mg Oral BID IS     polyethylene glycol  17 g Oral Daily     potassium chloride ER  20 mEq Oral Daily     predniSONE  5 mg Oral Daily     senna-docusate  1 tablet  Oral BID     sodium chloride (PF)  3 mL Intracatheter Q8H     sulfamethoxazole-trimethoprim  1 tablet Oral Daily     vancomycin (VANCOCIN) IV  1,000 mg Intravenous Q24H     Vitamin D3  1,000 Units Oral At Bedtime     warfarin ANTICOAGULANT  3 mg Oral ONCE at 18:00              Physical Exam:   Vitals were reviewed  Temp:  [97.4  F (36.3  C)-98.4  F (36.9  C)] 97.5  F (36.4  C)  Pulse:  [65-71] 71  Resp:  [16] 16  BP: (136-143)/(70-77) 140/76  SpO2:  [98 %-100 %] 98 %    Vitals:    09/08/20 0330   Weight: 51.3 kg (113 lb 1.6 oz)       Constitutional: Elderly adult female seen sitting in chair, in NAD. Awake, alert, interactive.  HEENT: NC/AT, EOMI, sclera clear, conjunctiva normal, OP with MMM  Respiratory: No increased work of breathing, CTAB except for crackles in right mid lung.  Cardiovascular: RRR, no murmur noted. No peripheral edema.  GI: Normal bowel sounds, soft, non-distended and non-tender.  Skin: Warm, dry. Appears thin with multiple scattered bruises on extremities. 3 large mepilex bandages on LLE.  Musculoskeletal: Extremities grossly normal, non-tender, no edema.   Neurologic: A&O. Answers questions appropriately, speech normal. Moves all extremities spontaneously.  Neuropsychiatric: Calm. Affect appropriate to situation.              Laboratory Data:   Microbiology:  Culture Micro   Date Value Ref Range Status   09/08/2020 No growth after 3 days  Preliminary   09/08/2020 No growth after 3 days  Preliminary   09/08/2020 >100,000 colonies/mL  Pseudomonas aeruginosa   (A)  Final   09/08/2020   Final    Sensitivities Requested  Bactrim   Susceptibility testing requested by  Iesha MUNOZ @1343 09/10/2020 [facility]  pager [phone]     09/08/2020   Final    for future reference Pseudomonas aeruginosa are intrinsically resistant to trimethoprim   sulfa (bactrim)         Metabolic Studies       Recent Labs   Lab Test 09/11/20  0724 09/10/20  0650 09/09/20  2247    134 132*   POTASSIUM 3.4 3.9 3.9    CHLORIDE 104 104 102   CO2 23 23 22   ANIONGAP 7 7 8   BUN 14 14 16   CR 0.76 0.78 0.78   GFRESTIMATED 71 70 70   GLC 76 75 125*   FARHAT 8.4* 8.4* 8.3*   MAG 1.9  --   --        Hepatic Studies    Recent Labs   Lab Test 09/10/20  0650   BILITOTAL 0.7   ALKPHOS 83   ALBUMIN 2.3*   AST 40   ALT 52*       Hematology Studies      Recent Labs   Lab Test 09/11/20  0724 09/10/20  0650 09/09/20  0616   WBC 4.0 5.4 6.2   HGB 10.5* 10.3* 9.8*   HCT 32.5* 32.4* 30.8*    183 162         Urine Studies     Recent Labs   Lab Test 09/08/20  1142   URINEPH 6.5   NITRITE Negative   LEUKEST Large*   WBCU >182*            Imaging:   EXAMINATION:  RENAL TRANSPLANT,  9/9/2020 10:53 AM                                 IMPRESSION:   1.  Mild pelvocaliectasis in the setting of overdistended bladder,  otherwise normal transplant kidney ultrasound. No perinephric abscess  2.  Mucosal thickening of the urinary bladder with echogenic particles  in the urine is consistent with the patient's history of UTI.     Exam: XR CHEST PORT 1 VW, 9/8/2020 11:18 AM                                         Impression: Streaky perihilar and bibasilar opacities may represent  atelectasis/scarring in the setting of low lung volumes, though  infection is not excluded.

## 2020-09-11 NOTE — PROGRESS NOTES
Social Work Services Discharge Note      Patient Name:  Meliza Krishnamurthy     Anticipated Discharge Date:  Saturday, 9/12/20    Discharge Disposition:   Other:  Swing Bed: [facility]   [address] [place] [address]  Admissions Ph [phone]  RN Station Ph [phone]  Fax [phone]    Following MD:  Dr. Zavala, ph [phone]     Pre-Admission Screening (PAS) online form has been completed.  The Level of Care (LOC) is:  Determined  Confirmation Code is:  [id number]  Patient/caregiver informed of referral to [facility] for Pre-Admission Screening for skilled nursing facility (SNF) placement and to expect a phone call post discharge from SNF.     Additional Services/Equipment Arranged:  Pt is arranging for family to transport her at 10am. Pt has contacted her 3 children with no response yet today but states she is sure someone can transport her. Pt's sister Nevaeh present at bedside and states she can transport pt if pt's children cannot. Pt declines having SW contact her children.     Patient / Family response to discharge plan:  Pt in agreement with plan.      Persons notified of above discharge plan:  Pt, primary team Johnathan Anderson, RN Swetha Elder- admissions at [facility] swing bed (ph [phone]), pt's sister Nevaeh    Staff Discharge Instructions:  RN please call report to [phone].  MD please call report to [phone].  SW to fax discharge orders and signed hard scripts for any controlled substances.  Please print a packet and send with patient.     Medicare Notice of Rights provided to the patient/family:  YES    ARINA Leon, [facility]    [facility]  Pager [phone]  Phone [phone]

## 2020-09-11 NOTE — PLAN OF CARE
Time  6870-0023    Pt alert and oriented x4, VSS on RA. Denies pain and nausea. Antibiotics switched to cefepime and vanco. Sensitivities from urine and blood cultures received from [place] and placed in pt chart. MD boyer. Pt also Afebrile throughout the day. Pt received IV iron infusion this morning with no complications. Pt infiltrated 2 PIVs during the shift, PICC line being placed this evening.  Pt continues to retain urine. Straight cathed x2 for 1300 mL and 700 mL respectively. Pt received 60 mg IV lasix. L leg wounds x3 cleansed and covered with mepilex. Wound bases are yellow and have small amount of drainage. Will request WOCN to come and reassess/place formal orders. Plan is for likely discharge to [facility] on Saturday if IV abx plan is established.

## 2020-09-11 NOTE — PROGRESS NOTES
Social Work Services Progress Note    Hospital Day: 4  Date of Initial Social Work Evaluation:  9/10/20  Collaborated with:  Swetha- [facility] swing bed unit (ph [phone], fax [phone]), primary team Gold 6, patient, RN    Data:  Pt is an 84-year-old female transferred from OSH admitted to [facility] 9/8/20 with UTI. TCU is recommended at discharge.    Intervention:  Per primary team, still awaiting final abx plan from ID but should know this afternoon. Spoke with Swetha at [facility] swing bed unit and she anticipates being able to accept pt tomorrow. She requests pt leave [facility] at 10am tomorrow if possible.     Met with pt and RN at bedside. Updated pt on plan for d/c tomorrow and provided address- pt to go to [facility] hospital entrance. Pt reports she knows where [facility] is as she has been there before. Pt plans to contact her children to see who can transport her tomorrow morning. Will check in with pt later to ensure a ride is arranged. Will update [facility] when abx plan known.    Assessment:  Planning for d/c tomorrow, 9/12 to [facility]    Plan:    Anticipated Disposition:  Facility:  Swing bed    Barriers to d/c plan:  Final abx plan    Follow Up:  SW to follow for discharge planning    ARINA Leon, [facility]    [facility]- [facility]  Pager [phone]  Phone [phone]    Addendum 3:00pm: Faxed updated notes to swing bed unit; they confirm they can accommodate new antibiotic plan and accept pt tomorrow.

## 2020-09-11 NOTE — PROGRESS NOTES
"                              Internal Medicine Progress Note - Gold Service  Meliza Krishnamurthy MRN: [id number]  Age: 84 year old, : 1935  Date: 2020         Interval History:     No acute events overnight. Doing well overnight. Sitting up in chair.     Last 24 hr care team notes reviewed.     ROS:  4 point ROS including Respiratory, CV, GI and , is negative other than above.     PHYSICAL EXAM    Vital signs:  Temp: 97.5  F (36.4  C) Temp src: Oral BP: (!) 140/76 Pulse: 71   Resp: 16 SpO2: 98 % O2 Device: None (Room air) Oxygen Delivery: 1 LPM Height: 162.6 cm (5' 4\") Weight: 51.3 kg (113 lb 1.6 oz)  Estimated body mass index is 19.41 kg/m  as calculated from the following:    Height as of this encounter: 1.626 m (5' 4\").    Weight as of this encounter: 51.3 kg (113 lb 1.6 oz).      Intake/Output Summary (Last 24 hours) at 2020 08  Last data filed at 2020  Gross per 24 hour   Intake 480 ml   Output 225 ml   Net 255 ml     GEN: NAD, frail looking lady, pleasant and cooperative , AAox3  HEENT: NC/AT , pale conjunctiva, anicteric sclera, EOMI, PERRL, MMM  Neck: trachea midline, no lymphadenopathy, JVD ~8   CV: RRR, nl S1/S2 no mumurs  PULM: symmetric chest rise, no accessory muscle use   Abdomen: soft, non tender/distented , no HSM, mesh palpabtable   EXTREMITIES: warm, no pedal edema  SKIN: few LE ulcers with scabs and surrounding erythema   NEURO: MS: AAOx3 - no focal deficit   Psycho: good mood     DIAGNOSTIC STUDIES  I reviewed all medications, laboratory results, and imaging over the past 24 hours. Notable for;   ROUTINE LABS:   Na 134  --  WBC 5.4   Hgb 10.5   ---    MICROBIOLOGY  OSH   Blood culture  (OSH): E.faecalis 1/3 bottles - Klebsiella 2/3 bottles   Urine  (OSH): pseudomonas   -----  Blood culture : NGTD  Urine culture : pseudomonas , Bari I - Zosyn S - Ceftaz S     IMAGING  Renal US:  1.  Mild pelvocaliectasis in the setting of overdistended " bladder,  otherwise normal transplant kidney ultrasound. No perinephric abscess  2.  Mucosal thickening of the urinary bladder with echogenic particles  in the urine is consistent with the patient's history of UTI.      Assessment and Plan:     Date of admission: 9/8/2020    Meliza Krishnamurthy is a 84 year old female admitted on 9/8/2020. She presents as a direct admit from [facility] due to renal transplant. Ms Faith presented to [facility] ER on 9/7/20 with fevers (up to 105) and found to have a UTI. She has a past medical history significant for polycystic kidney disease, renal transplant in 1998 followed at [facility], chronic immunosuppression (cyclosporine, mycophenolate, and prednisone), chronic pneumocystis prophylaxis with Bactrim, systolic (ejection fraction 49%) and diastolic congestive heart failure, carotid artery stenosis, cerebral artery aneurysm with surgical repair, atrial fibrillation, hypertension and dyslipidemia  She was recently hospitalized at [facility] with COVID-19 related pneumonia.       PLAN:  Today:  - Pending ID recs for final Abx plan   - Continue IV lasix --> transition to Pill in the AM   - PICC line placed overnight     # Klebsiella and Enterococcus blood stream infection possibly from a urinary source   # Recent Klebsiella bacteremia treated with oral B-lactam   Presented with sepsis. Blood culture from OSH growing Enterococcus and Klebsiella in 1/3 bottles. Most likely from a urinary source based on UA however can not rule out other source for GPC (skin translocation, has a surgical mesh). Repeat cultures here with NGTD from blood and pseudomonas from urine.     - Broaden to Meropenem eand Vancomycin ---> transition to Cefepime and vancomycin based on S  - ID consulted appreciate input --> no further imaging for source     #PCKD S/p renal transplant 1998   - Renal transplant consulted appreciate inpute  - Renal US with no perinephric abscess   *IS:   - cyclosporin 25 mg  twice daily  - mycophenolate 250 twice daily  - prednisone 5 mg daily  *PPX:   - Bactrim prophylaxis     # HFrEF 2/2 ICM vs NICM , chronic   #Multivalvular pathology (AI,MR,TR)  Last TTE on 6/24/2020 shows EF of 49% inferior wall motion abnormality. No ischemic work up on file. Will defer for now and will likely need cardiology follow up outpatient.   - AP: none   - BB: metoprolol  - Statin: on ezetimibe   - SCD PPX: EF>35%   - Diuretics: hold home lasix for now     # Atrial fibrillation  - RC: metoprolol 12.5 mg twice daily  - RyC: amiodarone 200 mg daily   - AC: Warfarin goal 2-3     # HTN  # HDL   as above.     # Iron deficiency and chronic disease Anemia  Start IV iron in the AM    Other notable conditions:  # LE discoloration and scattered abrasions  # Cerebral artery aneurysm s/p surgical repair: neurologically intact      Diet: Reguar  DVT Prophylaxis: Warfarin  Sams Catheter: not present  Code Status: full code      Consulting Services:   Plan for ID in the AM   Transplant nephrology     Disposition Plan   Expected discharge: Tomorrow, recommended to transitional care unit once antibiotic plan established.    Lines:  PIVs    Patient care plan discussed beside with patient, RN and patient's family .     Avery Vora MD  Internal Medicine Hospitalist & Staff Physician  [facility]  Pager: [phone]    Team: Brendan Mann   Page Cross Cover between 5pm-8am: pager [phone] (Johnathan), if no response then page job code [phone].

## 2020-09-11 NOTE — PLAN OF CARE
Discharge Planner OT   Patient plan for discharge: rehab tomorrow  Current status:  Pt ambulated x 80 ft with CGA and FWW, slow pace but no LOB noted. All VSS throughout session. BUE exercise completed, fair tolerance. Pt planning for discharge to TCU tomorrow.   Barriers to return to prior living situation: weakness, current level of assist, dizziness, lives alone  Recommendations for discharge: TCU  Rationale for recommendations: Pt would benefit from continued skilled OT services to improve independence and safety with ADL's and functional transfers as pt is not at baseline.           Entered by: Dora Segura 09/11/2020 3:18 PM

## 2020-09-11 NOTE — CONSULTS
"Urology Consult History and Physical    Name: Meliza Krishnamurthy    MRN: [id number]   YOB: 1935       We were asked to see Meliza Krishnamurthy at the request of Dr. Sage Vora for evaluation and treatment of the following chief complaint.          Chief Complaint:   Urinary retention    History is obtained from patient and review of records          History of Present Illness:   Meliza Krishnamurthy is a 84 year old female with pmh significant for CAD, HFrEF, Afib (on xarelto), HLD, polycystic kidney disease s/p right renal transplant, immunocompromised status, and recent COVID-19 infection, recent Klebsiella bacteremia (treated) who was admitted on 9/7/20 with fever and dizziness  Yessy is a 84 year old female with PMH of polycystic kidney disease s/p right renal transplant with no h/o rejection (on cyclosporine, MMF, pred 5), HFrEF, Afib, CAD, and recent COVID-19 infection who presented to the [facility] ED on 9/7 with Klebsiella and E faecalis bacteremia (seen in 1/3 bottles from OSH) and a Pseudomonas UTI.  She is on cefepime and vancomycin.     Urology has been consulted for urinary retention which was noted on 9/9/20 when she was straight cath'd for 1300cc.  Since then, straight cath outputs have been 300cc, 1200cc, 700cc, and 850cc.  There has been documentation of spontaneous voids. UOP yesterday was 3000cc. Creatinine has been at baseline (0.8mg/dL).  A renal US from 9/9 shows mild pelvocaliectasis, a distended bladder and a \"large volume\" postvoid residual.      She says shes had voiding dysfunction since her open hysterectomy 40 years ago (unsure why she needed the surgery).  She recalls someone recommended giving her a beer to help her urinate at that time, and it worked.  Now she voids sometimes as infrequently as once daily and othertimes more often than that.  Beer still helps her void.  She does sometimes have urge incontinence or mild nocturnal enuresis.  No dysuria.  Gets UTIs about once " annually.  No hematuria.     She is taking no narcotics  Bowel function - erratic. Sometimes daily and sometimes every few days  Besides kidney transplants, no pelvic surgeries except hysterectomy  Four vaginal deliveries.  No vaginal bulge or vaginal drainage.   No low back pathology  No neurologic diagnosis.  No DM    THere are no prior urology notes in EMR.  She has never seen a urologist.  [place]           Past Medical History:     Past Medical History:   Diagnosis Date     -donor kidney transplant     one rejection      Polycystic kidney disease      S/P cerebral aneurysm repair      S/P hip replacement     multiple revisions     Skin cancer of face             Past Surgical History:   No past surgical history on file.         Social History:     Social History     Tobacco Use     Smoking status: Never Smoker     Smokeless tobacco: Never Used   Substance Use Topics     Alcohol use: Yes     Comment: occ wine            Family History:   No family history on file.         Allergies:   No Known Allergies         Medications:     Current Facility-Administered Medications   Medication     acetaminophen (TYLENOL) tablet 650 mg     amiodarone (PACERONE) tablet 200 mg     ceFEPIme (MAXIPIME) 2 g vial to attach to  ml bag for ADULTS or 50 ml bag for PEDS     cycloSPORINE modified (GENERIC EQUIVALENT) capsule 25 mg     diphenhydrAMINE (BENADRYL) injection 50 mg     EPINEPHrine (ADRENALIN) kit 0.3 mg     ezetimibe (ZETIA) half-tab 5 mg     famotidine (PEPCID) injection 20 mg     famotidine (PEPCID) tablet 20 mg     ferrous sulfate (FEROSUL) tablet 324 mg     furosemide (LASIX) tablet 40 mg     heparin lock flush 10 UNIT/ML injection 2-5 mL     heparin lock flush 10 UNIT/ML injection 5-10 mL     heparin lock flush 10 UNIT/ML injection 5-10 mL     iron sucrose (VENOFER) 200 mg in sodium chloride 0.9 % 100 mL intermittent infusion     lidocaine (LMX4) cream      lidocaine 1 % 0.1-1 mL     melatonin tablet 1 mg     methylPREDNISolone sodium succinate (solu-MEDROL) injection 125 mg     metoprolol tartrate (LOPRESSOR) half-tab 12.5 mg     mycophenolate (GENERIC EQUIVALENT) capsule 250 mg     naloxone (NARCAN) injection 0.1-0.4 mg     ondansetron (ZOFRAN-ODT) ODT tab 4 mg    Or     ondansetron (ZOFRAN) injection 4 mg     oxyCODONE (ROXICODONE) tablet 5 mg     polyethylene glycol (MIRALAX) Packet 17 g     potassium chloride ER (KLOR-CON M) CR tablet 20 mEq     predniSONE (DELTASONE) tablet 5 mg     senna-docusate (SENOKOT-S/PERICOLACE) 8.6-50 MG per tablet 1 tablet     sodium chloride (PF) 0.9% PF flush 10-20 mL     sodium chloride (PF) 0.9% PF flush 3 mL     sodium chloride (PF) 0.9% PF flush 3 mL     sodium chloride (PF) 0.9% PF flush 5-50 mL     sulfamethoxazole-trimethoprim (BACTRIM) 400-80 MG per tablet 1 tablet     vancomycin (VANCOCIN) 1000 mg in dextrose 5% 200 mL PREMIX     Vitamin D3 (CHOLECALCIFEROL) tablet 1,000 Units     warfarin ANTICOAGULANT (COUMADIN) tablet 3 mg     Warfarin Therapy Reminder (Check START DATE - warfarin may be starting in the FUTURE)             Review of Systems:    ROS: See HPI for pertinent details.  Remainder of 10-point ROS negative.          Physical Exam:   VS:  T: 97.5    HR: 71    BP: 140/76    RR: 16   GEN:  AOx3.  NAD.  Pleasant.  HEENT:  Sclerae anicteric.  Conjunctivae pink.  Moist mucous membranes  NECK:  Supple.  No lymphadenopathy.  LUNGS: Non-labored breathing.  BACK:  No costoverterbral tenderness BL.  ABD:  Soft.  NT.  Rotund. + surgical scars well healed. No masses.      EXT:  Warm, well perfused.  No lower extremity edema bilaterally  SKIN:  Warm.  Dry.  No rashes.  NEURO:  CN grossly intact.          Data:   All laboratory data reviewed:    Recent Labs   Lab 09/11/20  0724 09/10/20  0650 09/09/20  0616 09/08/20  0452   WBC 4.0 5.4 6.2 8.1   HGB 10.5* 10.3* 9.8* 11.3*    183 162 153     Recent Labs   Lab  09/11/20  0724 09/10/20  0650 09/09/20  2247 09/09/20  0616    134 132* 133   POTASSIUM 3.4 3.9 3.9 3.4   CHLORIDE 104 104 102 102   CO2 23 23 22 22   BUN 14 14 16 14   CR 0.76 0.78 0.78 0.83   GLC 76 75 125* 84   FARHAT 8.4* 8.4* 8.3* 8.3*   MAG 1.9  --   --  1.9     Recent Labs   Lab 09/08/20  1142   COLOR Yellow   APPEARANCE Slightly Cloudy   URINEGLC Negative   URINEBILI Negative   URINEKETONE Negative   SG 1.015   URINEPH 6.5   PROTEIN 30*   NITRITE Negative   LEUKEST Large*   RBCU 22*   WBCU >182*     Results for orders placed or performed during the hospital encounter of 09/08/20   Urine Culture Aerobic Bacterial    Specimen: Catheterized Urine   Result Value Ref Range    Specimen Description Catheterized Urine     Special Requests Specimen received in preservative     Culture Micro >100,000 colonies/mL  Pseudomonas aeruginosa   (A)     Culture Micro       Sensitivities Requested  Bactrim   Susceptibility testing requested by  Iesha MUNOZ @1343 09/10/2020 [facility]  pager [phone]      Culture Micro       for future reference Pseudomonas aeruginosa are intrinsically resistant to trimethoprim   sulfa (bactrim)         Susceptibility    Pseudomonas aeruginosa - GELY     AMIKACIN <=2 Sensitive ug/mL     CEFEPIME 4 Sensitive ug/mL     CEFTAZIDIME 4 Sensitive ug/mL     CIPROFLOXACIN 2 Resistant ug/mL     GENTAMICIN <=1 Sensitive ug/mL     LEVOFLOXACIN >=8 Resistant ug/mL     Piperacillin/Tazo 16 Sensitive ug/mL     TOBRAMYCIN <=1 Sensitive ug/mL     MEROPENEM 4 Intermediate ug/mL    Pseudomonas aeruginosa - E-TEST     Trimethoprim/Sulfa >32.0 Resistant ug/mL       All pertinent imaging reviewed:  EXAMINATION: US RENAL TRANSPLANT,  9/9/2020 10:53 AM      COMPARISON: None.     HISTORY: UTI, rule out perinephric abscess     TECHNIQUE:  Grey-scale, color Doppler and spectral flow analysis.     FINDINGS:  The transplant kidney is located in the right lower quadrant, and  measures 10.1 cm. Parenchyma is of normal  thickness and echogenicity.  No focal lesions. There is mild pelvic caliectasis in the setting of  overdistended urinary bladder. No perinephric fluid collection. The  urinary bladder is overdistended and contains echogenic particles and  mucosal thickening. Large volume postvoid residual urine is noted.     Renal artery flow:   90 cm/s cm/sec peak systolic at hilum.  76 cm/s peak systolic at anastomosis.  Arcuate artery resistive indices (upper to lower): 0.78, 0.74, 0.82     Renal Vein Flow:  38.5 cm/sat hilum.   76 cm/s at anastomosis.     Iliac artery flow:  148.6 cm/s peak systolic above anastomosis.  83.3 cm/s peak systolic below anastomosis.     Iliac vein flow:  Patent above and below the anastomosis.                                                                      IMPRESSION:   1.  Mild pelvocaliectasis in the setting of overdistended bladder,  otherwise normal transplant kidney ultrasound. No perinephric abscess  2.  Mucosal thickening of the urinary bladder with echogenic particles  in the urine is consistent with the patient's history of UTI.     STEPHANIE ESTES         Impression and Plan:   Impression / Plan:   Meliza Krishnamurthy is a 84 year old female with complicated PMH as above.  From a urologic standpoint she has incomplete bladder emptying with overflow vs urge incontinence, likely chronic since her hysterectomy 40 years ago.  Despite bladder distension, her creatinine has remained good.  She does get UTIs.       RECS:    - Normalize bowel function.  Avoid constipation  - Encouraged timed voiding.  Encourage to void every 2 hours in the daytime  - Teach patient to straight catheterize herself using a 14Fr catheter.  The nurses can do this.   - Follow up outpatient with [facility].  Alternatively she can see a local urologist.  High postvoid residuals may be contributing to frequent UTIs.  THey may ultimately contribute to renal dysfunction.     CATHETERIZING INSTRUCTIONS:  Every four hours (in  "the daytime while you are awake):  1) Try to urinate then record your voided urine output amounts.    2) Then pass the catheter and record your catheterized urine outputs - this amount is called the \"postvoid residual\".    3) If your postvoid residuals are consistently >400mL, you should catheterize yourself more frequently.  If your postvoid residuals are <200mL, then catheterize less frequency. Once you are catheterizing once daily with postvoid residuals <200cc, then you can stop catheterizing altogether.   4) Remember that OVERSTRETCHING your bladder can damage the bladder muscle and make it more difficult for you to recover bladder function    Urology will sign off.  Please call with questions    Thank you for the opportunity to participate in the care of Meliza Krishnamurthy.     Litzy Suarez PA-C  Urology Physician Assistant  Personal Pager: [phone]    Please call Job Code:   [phone] to reach the Urology resident or PA on call - Weekdays  [phone] to reach the Urology resident or PA on call - Weeknights and weekends         "

## 2020-09-12 VITALS
TEMPERATURE: 97.7 F | RESPIRATION RATE: 18 BRPM | DIASTOLIC BLOOD PRESSURE: 72 MMHG | WEIGHT: 113.1 LBS | HEIGHT: 64 IN | OXYGEN SATURATION: 99 % | SYSTOLIC BLOOD PRESSURE: 153 MMHG | BODY MASS INDEX: 19.31 KG/M2 | HEART RATE: 68 BPM

## 2020-09-12 LAB — INR PPP: 2.61 (ref 0.86–1.14)

## 2020-09-12 PROCEDURE — 25800030 ZZH RX IP 258 OP 636: Performed by: STUDENT IN AN ORGANIZED HEALTH CARE EDUCATION/TRAINING PROGRAM

## 2020-09-12 PROCEDURE — 25000128 H RX IP 250 OP 636: Performed by: STUDENT IN AN ORGANIZED HEALTH CARE EDUCATION/TRAINING PROGRAM

## 2020-09-12 PROCEDURE — 25000132 ZZH RX MED GY IP 250 OP 250 PS 637: Mod: GY | Performed by: NURSE PRACTITIONER

## 2020-09-12 PROCEDURE — 25000132 ZZH RX MED GY IP 250 OP 250 PS 637: Mod: GY | Performed by: STUDENT IN AN ORGANIZED HEALTH CARE EDUCATION/TRAINING PROGRAM

## 2020-09-12 PROCEDURE — 99239 HOSP IP/OBS DSCHRG MGMT >30: CPT | Performed by: STUDENT IN AN ORGANIZED HEALTH CARE EDUCATION/TRAINING PROGRAM

## 2020-09-12 PROCEDURE — 85610 PROTHROMBIN TIME: CPT | Performed by: NURSE PRACTITIONER

## 2020-09-12 PROCEDURE — 25000131 ZZH RX MED GY IP 250 OP 636 PS 637: Mod: GY | Performed by: NURSE PRACTITIONER

## 2020-09-12 PROCEDURE — 36592 COLLECT BLOOD FROM PICC: CPT | Performed by: NURSE PRACTITIONER

## 2020-09-12 RX ORDER — CEFAZOLIN SODIUM 1 G/50ML
1250 SOLUTION INTRAVENOUS EVERY 12 HOURS
DISCHARGE
Start: 2020-09-12 | End: 2020-09-12

## 2020-09-12 RX ORDER — POLYETHYLENE GLYCOL 3350 17 G/17G
17 POWDER, FOR SOLUTION ORAL DAILY
DISCHARGE
Start: 2020-09-12

## 2020-09-12 RX ORDER — CEFAZOLIN SODIUM 1 G/50ML
1250 SOLUTION INTRAVENOUS EVERY 24 HOURS
DISCHARGE
Start: 2020-09-12 | End: 2021-01-01

## 2020-09-12 RX ORDER — CEFAZOLIN SODIUM 1 G/50ML
1250 SOLUTION INTRAVENOUS EVERY 24 HOURS
DISCHARGE
Start: 2020-09-12 | End: 2020-09-12

## 2020-09-12 RX ORDER — AMOXICILLIN 250 MG
1 CAPSULE ORAL 2 TIMES DAILY
DISCHARGE
Start: 2020-09-12

## 2020-09-12 RX ADMIN — FUROSEMIDE 40 MG: 40 TABLET ORAL at 08:29

## 2020-09-12 RX ADMIN — CEFEPIME HYDROCHLORIDE 2 G: 2 INJECTION, POWDER, FOR SOLUTION INTRAVENOUS at 00:39

## 2020-09-12 RX ADMIN — IRON SUCROSE 200 MG: 20 INJECTION, SOLUTION INTRAVENOUS at 08:28

## 2020-09-12 RX ADMIN — AMIODARONE HYDROCHLORIDE 200 MG: 200 TABLET ORAL at 08:34

## 2020-09-12 RX ADMIN — Medication 12.5 MG: at 08:29

## 2020-09-12 RX ADMIN — SULFAMETHOXAZOLE AND TRIMETHOPRIM 1 TABLET: 400; 80 TABLET ORAL at 08:30

## 2020-09-12 RX ADMIN — Medication 5 ML: at 10:06

## 2020-09-12 RX ADMIN — Medication 5 ML: at 10:07

## 2020-09-12 RX ADMIN — FAMOTIDINE 20 MG: 20 TABLET ORAL at 08:30

## 2020-09-12 RX ADMIN — MYCOPHENOLATE MOFETIL 250 MG: 250 CAPSULE ORAL at 08:30

## 2020-09-12 RX ADMIN — POLYETHYLENE GLYCOL 3350 17 G: 17 POWDER, FOR SOLUTION ORAL at 08:29

## 2020-09-12 RX ADMIN — POTASSIUM CHLORIDE 20 MEQ: 750 TABLET, EXTENDED RELEASE ORAL at 08:29

## 2020-09-12 RX ADMIN — CEFTAZIDIME 2 G: 2 INJECTION, POWDER, FOR SOLUTION INTRAVENOUS at 09:25

## 2020-09-12 RX ADMIN — FERROUS SULFATE TAB 325 MG (65 MG ELEMENTAL FE) 325 MG: 325 (65 FE) TAB at 08:30

## 2020-09-12 RX ADMIN — PREDNISONE 5 MG: 5 TABLET ORAL at 08:31

## 2020-09-12 RX ADMIN — CYCLOSPORINE 25 MG: 25 CAPSULE, LIQUID FILLED ORAL at 08:31

## 2020-09-12 ASSESSMENT — ACTIVITIES OF DAILY LIVING (ADL)
ADLS_ACUITY_SCORE: 21
ADLS_ACUITY_SCORE: 22
ADLS_ACUITY_SCORE: 21

## 2020-09-12 NOTE — PHARMACY-VANCOMYCIN DOSING SERVICE
Pharmacy Vancomycin Note  Date of Service 2020  Patient's  1935   84 year old, female    Indication: Bacteremia  Goal Trough Level: 15-20 mg/L  Day of Therapy: 4  Current Vancomycin regimen: 1000 mg IV q24h    Current estimated CrCl = Estimated Creatinine Clearance: 44.6 mL/min (based on SCr of 0.76 mg/dL).    Creatinine for last 3 days  2020:  6:16 AM Creatinine 0.83 mg/dL; 10:47 PM Creatinine 0.78 mg/dL  9/10/2020:  6:50 AM Creatinine 0.78 mg/dL  2020:  7:24 AM Creatinine 0.76 mg/dL    Recent Vancomycin Levels (past 3 days)  2020:  6:08 PM Vancomycin Level 11.4 mg/L    Vancomycin IV Administrations (past 72 hours)                   vancomycin (VANCOCIN) 1000 mg in dextrose 5% 200 mL PREMIX (mg) 1,000 mg New Bag 09/10/20 1708     1,000 mg New Bag 20 1857                Nephrotoxins and other renal medications (From now, onward)    Start     Dose/Rate Route Frequency Ordered Stop    20 1700  vancomycin (VANCOCIN) 1000 mg in dextrose 5% 200 mL PREMIX      1,000 mg  200 mL/hr over 1 Hours Intravenous EVERY 24 HOURS 20 1652      20 0800  cycloSPORINE modified (GENERIC EQUIVALENT) capsule 25 mg      25 mg Oral 2 TIMES DAILY 20 0400      20 0800  furosemide (LASIX) tablet 40 mg      40 mg Oral DAILY 20 0538               Contrast Orders - past 72 hours (72h ago, onward)    None          Interpretation of levels and current regimen:  Trough level is  Subtherapeutic    Has serum creatinine changed > 50% in last 72 hours: No    Urine output:  good urine output    Renal Function: Stable    Plan:  1.  Increase Dose to vancomycin 1250 mg IV Q24hr  2.  Pharmacy will check trough levels as appropriate in 1-3 Days.    3. Serum creatinine levels will be ordered daily for the first week of therapy and at least twice weekly for subsequent weeks.      Kayla Anne, PharmD          .

## 2020-09-12 NOTE — PROVIDER NOTIFICATION
Primary team paged to update on urine output- Pt has had no spontaneous urination- only urine output per straight cath. Also noted- urethra appears edematous- maybe this is patients normal appearance but thought worth mentioning.    Primary team called me back and okay with plan to keep straight cathing for output at TCU toady.

## 2020-09-12 NOTE — PLAN OF CARE
[id number]: VSS on room air, hypertension noted- a.m. hypertension medications given. Pt denies pain/n/v. Pt on regular diet- appetite good- just small. Pt continues to not be able to void spontaneously. Pt bladder scanned right before discharge today- 450cc, bladder scanned and straight cathed for 350cc. Pt will discharge to [facility], pt's granddaughter will provide transportation.  All belongings gathered by pt. Packet sent with pt. Rn to RN report given, wound care supplies given.  RUE double lumen PICC heparin locked. Fortaz given this am q 12hrs. , Iron infusion given. INR 2.61

## 2020-09-12 NOTE — PLAN OF CARE
Pt alert and oriented, VSS on RA. Up with 1 assist, gait belt, and walker. Unable to void and empty bladder this shift. Bladder scan at bedtime and straight cath for 925 mL. MD paged and notified of this. Bladder scan this AM for 722 and straight cath completed @ 0630. COVID negative. Regular diet and tolerating well. Right double lumen PICC, one lumen running TKO with intermittent infusions of IV antibiotics and the other lumen heparin locked. Urology following. Continue with POC.

## 2020-09-12 NOTE — PLAN OF CARE
Pt vss on room air AOx4, up SBA/walker/gaitbelt. Pt denies pain/n/v. Pt given antibiotic via RUE PICC.  NS tko. Purple port hep locked. Pt has WOC following for wounds on ankles. Buttock has bruised area's - pt states from home- sitting hard on toilet. Pt tolerated regular diet. Plan for discharge to [facility] in [facility] tomorrow- family to provide ride @ 1000.  Pt aware of plan. Will continue to follow POC/monitor.

## 2020-09-12 NOTE — PROGRESS NOTES
[facility] [place]   Transplant Nephrology Progress Note  Date of Admission:  9/8/2020  Today's Date: 09/12/2020    Recommendations:  - Would recommend Urology referral to follow up on her urinary retention  - Would continue with CIC on discharge, but recommend 4-5x per day in light of high PVR in the 800-900s on a couple of occasions.  - Agree with transition to vanc/cefepime with noted sensitivities     Assessment & Plan      # DDKT: stable, TAZ on presentation to OSH w/ Scr of 1, now stable at ~0.8              - Baseline Cr ~ 0.7-0.9              - Proteinuria: Normal (<0.2 grams)              - Date DSA Last Checked: Oct/2018      Latest DSA: Significant DSA  (>3000 mfi) to multiple class I antigens               - BK Viremia: No              - Kidney Tx Biopsy: No     # Immunosuppression: Cyclosporine (goal 50-75), Mycophenolate mofetil (dose 500mg in AM/250mg in PM) and Prednisone (dose 5 mg daily)              - Changes: No     # Infection Prophylaxis:   - PJP: Sulfa/TMP (Bactrim)     # Hypertension: controlled;   Goal BP: < 130/80              - Volume status: Euvolemic                  - Changes: No     # UTI: Patient is no afebrile on antibiotics.  UCx with pseudomonas and BlCx with enterococcus faecalis and Klebsiella.  Renal transplant ultrasound on 9/9 negative for perinephric abscess.  Currently on vanc/ceftazidime for planned 3 week course per Transplant ID.     #Hyponatremia:  Stable, now normal serum sodium after IVF and resolution of prerenal state.    # Anemia:  Evidence of iron deficiency with Tsat 9%. Reciving IV venofer     # Transplant History:  Etiology of Kidney Failure: PKD  Tx: DDKT  Transplant: 1/6/1998 (Kidney)  Donor Type: Donation after Brain Death        Donor Class: Standard Criteria Donor     Recommendations were communicated to the primary team via this note.     Dat Aquino MD  Pager: [phone]    Interval History    Ms. Krishnamurthy's creatinine  "remains stable at ~ 0.8 today.  She continues to have issues with urinary retention with PVR from 300s to 900s and requires CIC.  Patient otherwise feels good.  No fever, sweats or chills and has been afebrile.  No nausea, vomiting or diarrhea.  No chest pain or shortness of breath.  No leg swelling.    Review of Systems   4 point ROS was obtained and negative except as noted in the Interval History.    MEDICATIONS:    amiodarone  200 mg Oral Daily     cefTAZidime  2 g Intravenous Q12H     cycloSPORINE modified  25 mg Oral BID     ezetimibe  5 mg Oral At Bedtime     famotidine  20 mg Oral Daily     ferrous sulfate  324 mg Oral Daily     furosemide  40 mg Oral Daily     heparin lock flush  5-10 mL Intracatheter Q24H     iron sucrose (VENOFER) intermittent infusion (200 mg)  200 mg Intravenous Q24H     metoprolol tartrate  12.5 mg Oral BID     mycophenolate  250 mg Oral BID IS     polyethylene glycol  17 g Oral Daily     potassium chloride ER  20 mEq Oral Daily     predniSONE  5 mg Oral Daily     senna-docusate  1 tablet Oral BID     sodium chloride (PF)  3 mL Intracatheter Q8H     sulfamethoxazole-trimethoprim  1 tablet Oral Daily     vancomycin (VANCOCIN) IV  1,250 mg Intravenous Q24H     Vitamin D3  1,000 Units Oral At Bedtime       Warfarin Therapy Reminder         Physical Exam   Temp  Av.8  F (37.7  C)  Min: 98.6  F (37  C)  Max: 101.8  F (38.8  C)      Pulse  Av  Min: 65  Max: 74 Resp  Av.3  Min: 16  Max: 18  SpO2  Av %  Min: 86 %  Max: 99 %     BP (!) 153/72 (BP Location: Left arm)   Pulse 68   Temp 97.7  F (36.5  C) (Oral)   Resp 18   Ht 1.626 m (5' 4\")   Wt 51.3 kg (113 lb 1.6 oz)   SpO2 99%   BMI 19.41 kg/m      Admit Weight: 51.3 kg (113 lb 1.6 oz)     GENERAL APPEARANCE: alert and no distress  HENT: mouth without ulcers or lesions  RESP: lungs clear to auscultation - no rales, rhonchi or wheezes  CV: regular rhythm, normal rate, no rub, no murmur  EDEMA: no LE edema " bilaterally  ABDOMEN: soft, nondistended, nontender, bowel sounds normal  MS: extremities normal - no gross deformities noted, no evidence of inflammation in joints, no muscle tenderness  SKIN: no rash  TX KIDNEY: normal    Data   All labs reviewed by me.  CMP  Recent Labs   Lab 09/11/20  0724 09/10/20  0650 09/09/20  2247 09/09/20  0616    134 132* 133   POTASSIUM 3.4 3.9 3.9 3.4   CHLORIDE 104 104 102 102   CO2 23 23 22 22   ANIONGAP 7 7 8 8   GLC 76 75 125* 84   BUN 14 14 16 14   CR 0.76 0.78 0.78 0.83   GFRESTIMATED 71 70 70 64   GFRESTBLACK 83 81 81 75   FARHAT 8.4* 8.4* 8.3* 8.3*   MAG 1.9  --   --  1.9   PROTTOTAL  --  5.0*  --   --    ALBUMIN  --  2.3*  --   --    BILITOTAL  --  0.7  --   --    ALKPHOS  --  83  --   --    AST  --  40  --   --    ALT  --  52*  --   --      CBC  Recent Labs   Lab 09/11/20  0724 09/10/20  0650 09/09/20  0616 09/08/20  0452   HGB 10.5* 10.3* 9.8* 11.3*   WBC 4.0 5.4 6.2 8.1   RBC 3.58* 3.46* 3.34* 3.83   HCT 32.5* 32.4* 30.8* 36.2   MCV 91 94 92 95   MCH 29.3 29.8 29.3 29.5   MCHC 32.3 31.8 31.8 31.2*   RDW 16.7* 17.0* 17.2* 17.6*    183 162 153     INR  Recent Labs   Lab 09/12/20  0758 09/11/20  0724 09/10/20  0650 09/09/20  0616   INR 2.61* 1.95* 1.92* 1.68*     ABGNo lab results found in last 7 days.   Urine Studies  Recent Labs   Lab Test 09/08/20  1142   COLOR Yellow   APPEARANCE Slightly Cloudy   URINEGLC Negative   URINEBILI Negative   URINEKETONE Negative   SG 1.015   UBLD Small*   URINEPH 6.5   PROTEIN 30*   NITRITE Negative   LEUKEST Large*   RBCU 22*   WBCU >182*     Recent Labs   Lab Test 10/28/16  1453   UTPG 0.39*     PTH  No lab results found.  Iron Studies  Recent Labs   Lab Test 09/09/20  0616 10/30/18  1508   IRON 16* 41   * 435*   IRONSAT 9* 10*       IMAGING:  All imaging studies reviewed by me.    Renal txp U/S 9/9: normal but with mild pelviectasis in setting of distended bladder. Echogenic material in bladder.

## 2020-09-12 NOTE — PROGRESS NOTES
Social Work Services Progress Note     Hospital Day: 4  Date of Initial Social Work Evaluation:  9/10/20  Collaborated with:  Chart review, bedside RN, swing bed admissions    Data: Pt is a 84 year old female being followed by SW for discharge to swing bed at 10 am.     Intervention:  SW request to assist with sending discharge orders and summary to swing bed prior to 10 am. SW called and confirmed with bedside RN that pt has a ride with family.  Discharge orders faxed.  Will send summary once completed.     - Referrals in Process:    [facility]   PH: [phone]  F: [phone]     Assessment: Did not meet with pt for this encounter note.     Plan:    Anticipated Disposition: Facility:  [facility] at 10 am - transporting with family.    Barriers to d/c plan: None    Follow Up: No other needs identified.  Please page weekend SW if urgent needs arise.     NICKO London, ALIYAW  Weekend Adult Acute Care      Care Management Dept: PH: [phone]  4A, 4C, 4E, 5A and 5B; pager [phone]  6A, 6B, 6C and 6D; pager [phone]  7A, 7B, 7C, 7D and 5C; pager [phone]  [facility] pager: [phone]     RNCC/Care Coordinator; job code [id number]

## 2020-09-14 ENCOUNTER — COMMUNICATION - HEALTHEAST (OUTPATIENT)
Dept: FAMILY MEDICINE | Facility: CLINIC | Age: 85
End: 2020-09-14

## 2020-09-14 LAB
BACTERIA SPEC CULT: NO GROWTH
BACTERIA SPEC CULT: NO GROWTH
SPECIMEN SOURCE: NORMAL
SPECIMEN SOURCE: NORMAL

## 2020-09-14 NOTE — PLAN OF CARE
Physical Therapy Discharge Summary    Reason for therapy discharge:    Discharged to swing bed at SNF.    Progress towards therapy goal(s). See goals on Care Plan in [facility] electronic health record for goal details.  Goals partially met.  Barriers to achieving goals:   discharge from facility.    Therapy recommendation(s):    Continued therapy is recommended.  Rationale/Recommendations:  To improve functional mobility and IND in ADLs.

## 2020-09-14 NOTE — PLAN OF CARE
Occupational Therapy Discharge Summary    Reason for therapy discharge:    Discharged to swing bed    Progress towards therapy goal(s). See goals on Care Plan in [facility] electronic [facility] record for goal details.  Goals partially met.  Barriers to achieving goals:   discharge from facility.    Therapy recommendation(s):    Continued therapy is recommended.  Rationale/Recommendations:  to progress safety and IND with ADL/IADLs.

## 2020-09-15 DIAGNOSIS — Z94.0 DECEASED-DONOR KIDNEY TRANSPLANT RECIPIENT: Primary | ICD-10-CM

## 2020-09-15 NOTE — TELEPHONE ENCOUNTER
Action    Action Taken All records in [facility] hospital discharge. sean    cdk

## 2020-09-16 RX ORDER — MYCOPHENOLATE MOFETIL 250 MG/1
500 CAPSULE ORAL 2 TIMES DAILY
Qty: 120 CAPSULE | Refills: 11 | Status: SHIPPED | OUTPATIENT
Start: 2020-09-16 | End: 2021-01-01

## 2020-09-17 NOTE — DISCHARGE SUMMARY
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  Multivalvular cardiomyopathy   A.fib   HTN  HLD   Iron deficiency anemia              Discharge Disposition:     Discharged to short-term care facility           Condition on Discharge:     Discharge condition: Stable   Code status on discharge: Full Code           Procedures:   No procedures performed during this admission          Discharge Medications:     Discharge Medication List as of 9/12/2020  9:31 AM      START taking these medications    Details   cefTAZidime (FORTAZ) 2 GM vial Inject 2 g into the vein every 12 hours for 16 days, Transitional      polyethylene glycol (MIRALAX) 17 g packet Take 17 g by mouth daily, Transitional      senna-docusate (SENOKOT-S/PERICOLACE) 8.6-50 MG tablet Take 1 tablet by mouth 2 times daily, Transitional         CONTINUE these medications which have CHANGED    Details   vancomycin 1,250 mg Inject 1,250 mg into the vein every 24 hours, Transitional         CONTINUE these medications which have NOT CHANGED    Details   amiodarone (PACERONE) 200 MG tablet Take 200 mg by mouth daily, Historical      cholecalciferol (VITAMIN D-1000 MAX ST) 1000 units TABS Take 1,000 Units by mouth At Bedtime, Historical      cycloSPORINE modified (GENERIC EQUIVALENT) 25 MG capsule Take 1 capsule (25 mg) by mouth 2 times daily, Disp-60 capsule,R-11, E-Prescribe      ezetimibe (ZETIA) 10 MG tablet Take  5 mg by mouth daily , Historical      famotidine (PEPCID) 20 MG tablet Take 20 mg by mouth 2 times daily , Historical      Ferrous Sulfate 324 (65 Fe) MG TBEC Take 324 mg by mouth 2 times daily , Historical      furosemide (LASIX) 20 MG tablet Take 40 mg by mouth in the morning and 20 mg at noon, Historical      metoprolol tartrate (LOPRESSOR) 25 MG tablet Take 12.5 mg by mouth 2 times daily, Historical      Multiple Vitamins-Minerals (MULTIVITAL PO) Take 1 tablet by mouth daily , Historical      potassium chloride ER (K-DUR/KLOR-CON M) 20 MEQ CR tablet Take 20 mEq by mouth 2 times daily , Historical      predniSONE (DELTASONE) 5 MG tablet Take 1 tablet by mouth daily., Disp-30 tablet, R-11, E-Prescribe      sulfamethoxazole-trimethoprim (BACTRIM) 400-80 MG per tablet Take 1 tablet by mouth daily, Historical      warfarin ANTICOAGULANT (COUMADIN) 2.5 MG tablet Take 1.25 mg by mouth daily or as directed, Historical      mycophenolate (GENERIC EQUIVALENT) 250 MG capsule TAKE 2 CAPSULES BY MOUTH EVERY MORNING AND 1 CAPSULE EVERY EVENING, Disp-90 capsule, R-11, E-Prescribe                   Consultations:     Consultation during this admission received from:  ID             Brief History of Illness:   Meliza Krishnamurthy is a 84 year old female admitted on 9/8/2020. She presents as a direct admit from [facility] due to renal transplant. Ms Faith presented to [facility] on 9/7/20 with fevers (up to 105) and found to have a UTI. She has a past medical history significant for polycystic kidney disease, renal transplant in 1998 followed at [facility], chronic immunosuppression (cyclosporine, mycophenolate, and prednisone), chronic pneumocystis prophylaxis with Bactrim, systolic (ejection fraction 49%) and diastolic congestive heart failure, carotid artery stenosis, cerebral artery aneurysm with surgical repair, atrial fibrillation, hypertension and dyslipidemia  She was recently hospitalized at [place] with  COVID-19 related pneumonia.           Hospital Course:      # Klebsiella and Enterococcus blood stream infection possibly from a urinary source   # Recent Klebsiella bacteremia treated with oral B-lactam   # Pseudomonas UTI   Presented with sepsis. Blood culture from OSH growing Enterococcus and Klebsiella in 1/3 bottles. Most likely from a urinary source based on UA however can not rule out other source for GPC (skin translocation, has a surgical mesh). Repeat cultures here with NGTD from blood and pseudomonas from urine.   ID consulted and recommended not to pursue further imaging as it was felt that the source is likely graft kidney pyelonephritis.      - 3 Weeks of IV Ceftazidime and Vancomycin (end date 9/28)   - Remove PICC when Abx are done  - Weekly CBC, CMP while on IV therapy      #PCKD S/p renal transplant 1998   - Renal transplant consulted appreciate inpute  - Renal US with no perinephric abscess   *IS:   - cyclosporin 25 mg twice daily  - mycophenolate 250 twice daily  - prednisone 5 mg daily  *PPX:   - Bactrim prophylaxis      # HFrEF 2/2 ICM vs NICM , chronic   #Multivalvular pathology (AI,MR,TR)  Last TTE on 6/24/2020 shows EF of 49% inferior wall motion abnormality. No ischemic work up on file. Will defer for now and will likely need cardiology follow up outpatient.   - AP: none   - BB: Metoprolol  - Statin: on ezetimibe   - SCD PPX: EF>35%   - Diuretics: lasix      # Atrial fibrillation  - RC: metoprolol 12.5 mg twice daily  - RyC: amiodarone 200 mg daily   - AC: Warfarin goal 2-3      # HTN  # HDL   as above.      # Iron deficiency and chronic disease Anemia  Started on IV iron while inpatient.      Other notable conditions:  # LE discoloration and scattered abrasions  # Cerebral artery aneurysm s/p surgical repair: neurologically intact                Final Day of Progress before Discharge:     No data found.    EXAM:  GEN: NAD, frail looking lady, pleasant and cooperative , AAox3  HEENT: NC/AT  , pale conjunctiva, anicteric sclera, EOMI, PERRL, MMM  Neck: trachea midline, no lymphadenopathy, JVD ~8   CV: RRR, nl S1/S2 no mumurs  PULM: symmetric chest rise, no accessory muscle use   Abdomen: soft, non tender/distented , no HSM, mesh palpabtable   EXTREMITIES: warm, no pedal edema  SKIN: few LE ulcers with scabs and surrounding erythema   NEURO: MS: AAOx3 - no focal deficit   Psycho: good mood          Data:  All laboratory data reviewed             Significant Results:       No results found for this or any previous visit (from the past 24 hour(s)).     No results found for this or any previous visit (from the past 48 hour(s)).             Pending Results:     Unresulted Labs Ordered in the Past 30 Days of this Admission     No orders found from 8/9/2020 to 9/9/2020.                  Discharge Instructions and Follow-Up:     Discharge Procedure Orders   UROLOGY ADULT REFERRAL   Standing Status: Future   Referral Priority: Routine Referral Type: Consultation   Requested Specialty: Urology   Number of Visits Requested: 1     General info for SNF   Order Comments: Length of Stay Estimate: Short Term Care: Estimated # of Days <30  Condition at Discharge: Improving  Level of care:board and care  Rehabilitation Potential: Excellent  Admission H&P remains valid and up-to-date: Yes  Recent Chemotherapy: N/A  Use Nursing Home Standing Orders: Yes     Mantoux instructions   Order Comments: Give two-step Mantoux (PPD) Per Facility Policy Yes     Reason for your hospital stay   Order Comments: You were hospitalized for a blood infection. You were found to have two bacteria in your blood and one different bacteria in your urine. It is unclear where the bacteria in the blood came from but our best guess that it is came from the urine.   Please make sure to have a bowel movement daily and empty your bladder with straight catheter.   It was our pleasure taking care of you. Please call [phone] and ask for 5A if you have  "any questions.     Intake and output   Order Comments: Every shift     Daily weights   Order Comments: Call Provider for weight gain of more than 2 pounds per day or 5 pounds per week.     Bladder scan   Order Comments: X 2 for post void residual  Straight cath instruction:   Every four hours (in the daytime while you are awake):  1) Try to urinate then record your voided urine output amounts.    2) Then pass the catheter and record your catheterized urine outputs - this amount is called the \"postvoid residual\".    3) If your postvoid residuals are consistently >400mL, you should catheterize yourself more frequently.  If your postvoid residuals are <200mL, then catheterize less frequency. Once you are catheterizing once daily with postvoid residuals <200cc, then you can stop catheterizing altogether.   4) Remember that OVERSTRETCHING your bladder can damage the bladder muscle and make it more difficult for you to recover bladder function     Follow Up and recommended labs and tests   Order Comments: Follow up with intermediate physician.  The following labs/tests are recommended: weekly CBC, CMP while on IV antibiotics (next check 9/16).    Follow up with Urology at the [place]. Order for follow up placed.     Additional Discharge Instructions   Order Comments: Remove PICC line when Antibiotics are over  ---  Straight cath instruction:  Every four hours (in the daytime while you are awake):  1) Try to urinate then record your voided urine output amounts.    2) Then pass the catheter and record your catheterized urine outputs - this amount is called the \"postvoid residual\".    3) If your postvoid residuals are consistently >400mL, you should catheterize yourself more frequently.  If your postvoid residuals are <200mL, then catheterize less frequency. Once you are catheterizing once daily with postvoid residuals <200cc, then you can stop catheterizing altogether.   4) Remember that OVERSTRETCHING your bladder can " damage the bladder muscle and make it more difficult for you to recover bladder function     Activity - Up ad dylon     Order Specific Question Answer Comments   Is discharge order? Yes      Full Code     Order Specific Question Answer Comments   Code status determined by: Discussion with patient/ legal decision maker      Physical Therapy Adult Consult   Order Comments: Evaluate and treat as clinically indicated.    Reason:  TCU     Occupational Therapy Adult Consult   Order Comments: Evaluate and treat as clinically indicated.    Reason:  TCU     Advance Diet as Tolerated   Order Comments: Follow this diet upon discharge: Orders Placed This Encounter      Snacks/Supplements Adult: Boost Plus; Between Meals      Combination Diet Regular Diet Adult     Order Specific Question Answer Comments   Is discharge order? Yes             Date of service: 9/12/2020       >30 minutes spent in discharge, including >50% in counseling and coordination of care, medication review and plan of care recommended on follow up. Questions were answered.   Warm Hand off was given to TCU physician.   It was our pleasure to care for Ms. Verduzco. Please do not hesitate to contact me should there be questions regarding the hospital course or discharge plan.      Avery Vora MD  Internal Medicine Hospitalist & Staff Physician  [facility]  Pager: [phone]  c

## 2020-09-22 ENCOUNTER — PATIENT OUTREACH (OUTPATIENT)
Dept: UROLOGY | Facility: CLINIC | Age: 85
End: 2020-09-22

## 2020-09-22 NOTE — TELEPHONE ENCOUNTER
Dr. Tan calling to speak with one of the Urologist prior to releasing this patient from the hospital. This patient was just discharged 9/12/20 from [facility] and has been admitted again to the hospital.  Patient now has retention with post void residuals of 1000 mL with voiding 500 mL. Dr. Tan wants to discuss this matter with an Urologist before the patient is discharged.  Rosana Chairez RN   Care Coordinator Urology

## 2020-09-29 ENCOUNTER — PATIENT OUTREACH (OUTPATIENT)
Dept: UROLOGY | Facility: CLINIC | Age: 85
End: 2020-09-29

## 2020-09-29 NOTE — TELEPHONE ENCOUNTER
Patient called to move her appointment up a week. Patient says she was taught how to self catheterize herself while in the hospital. She has not started to do so yet because she started to urinate more frequently now. Patient has history of kidney transplant.   Rosana Chairez RN   Care Coordinator Urology

## 2020-09-30 ENCOUNTER — VIRTUAL VISIT (OUTPATIENT)
Dept: UROLOGY | Facility: CLINIC | Age: 85
End: 2020-09-30
Attending: STUDENT IN AN ORGANIZED HEALTH CARE EDUCATION/TRAINING PROGRAM
Payer: MEDICARE

## 2020-09-30 DIAGNOSIS — N39.0 RECURRENT UTI: ICD-10-CM

## 2020-09-30 DIAGNOSIS — R33.9 URINARY RETENTION: ICD-10-CM

## 2020-09-30 DIAGNOSIS — N95.2 ATROPHIC VAGINITIS: Primary | ICD-10-CM

## 2020-09-30 NOTE — PROGRESS NOTES
"Video Visit Technology for this patient: Well Video Visit- Patient was left in waiting room    Meliza Krishnamurthy is a 84 year old female who is being evaluated via a billable video visit.      The patient has been notified of following:     \"This video visit will be conducted via a call between you and your physician/provider. We have found that certain health care needs can be provided without the need for an in-person physical exam.  This service lets us provide the care you need with a video conversation.  If a prescription is necessary we can send it directly to your pharmacy.  If lab work is needed we can place an order for that and you can then stop by our lab to have the test done at a later time.    Video visits are billed at different rates depending on your insurance coverage.  Please reach out to your insurance provider with any questions.    If during the course of the call the physician/provider feels a video visit is not appropriate, you will not be charged for this service.\"    Patient has given verbal consent for Video visit? Yes  How would you like to obtain your AVS? MyChart  If you are dropped from the video visit, the video invite should be resent to: Text to cell phone: [phone]  Will anyone else be joining your video visit? No        Video-Visit Details    Type of service:  Video Visit    Video Start Time: 9:12 AM  Video End Time: 9:35 AM    Originating Location (pt. Location): Home    Distant Location (provider location):  [facility] AND [facility] FOR PR[facility]IC [facility]CERS     Platform used for Video Visit: kamila Junior MD        "

## 2020-09-30 NOTE — NURSING NOTE
Chief Complaint   Patient presents with     Consult     retention       Patient Active Problem List   Diagnosis     Carotid artery stenosis, asymptomatic     Status post kidney transplant     Polycystic kidney disease     S/P hip replacement     Renal hypertension     Immunosuppression (H)     Hypovitaminosis D     Need for pneumocystis prophylaxis     UTI (urinary tract infection)       No Known Allergies    Current Outpatient Medications   Medication Sig Dispense Refill     amiodarone (PACERONE) 200 MG tablet Take 200 mg by mouth daily       cholecalciferol (VITAMIN D-1000 MAX ST) 1000 units TABS Take 1,000 Units by mouth At Bedtime       cycloSPORINE modified (GENERIC EQUIVALENT) 25 MG capsule Take 1 capsule (25 mg) by mouth 2 times daily 60 capsule 11     ezetimibe (ZETIA) 10 MG tablet Take 5 mg by mouth daily        famotidine (PEPCID) 20 MG tablet Take 20 mg by mouth 2 times daily        Ferrous Sulfate 324 (65 Fe) MG TBEC Take 324 mg by mouth 2 times daily        furosemide (LASIX) 20 MG tablet Take 40 mg by mouth in the morning and 20 mg at noon       metoprolol tartrate (LOPRESSOR) 25 MG tablet Take 12.5 mg by mouth 2 times daily       Multiple Vitamins-Minerals (MULTIVITAL PO) Take 1 tablet by mouth daily        mycophenolate (GENERIC EQUIVALENT) 250 MG capsule Take 2 capsules (500 mg) by mouth 2 times daily Total dose = 500 mg in the AM and 250 mg in the  capsule 11     polyethylene glycol (MIRALAX) 17 g packet Take 17 g by mouth daily       potassium chloride ER (K-DUR/KLOR-CON M) 20 MEQ CR tablet Take 20 mEq by mouth 2 times daily        predniSONE (DELTASONE) 5 MG tablet Take 1 tablet by mouth daily. 30 tablet 11     senna-docusate (SENOKOT-S/PERICOLACE) 8.6-50 MG tablet Take 1 tablet by mouth 2 times daily       sulfamethoxazole-trimethoprim (BACTRIM) 400-80 MG per tablet Take 1 tablet by mouth daily       vancomycin 1,250 mg Inject 1,250 mg into the vein every 24 hours       warfarin  ANTICOAGULANT (COUMADIN) 2.5 MG tablet Take 1.25 mg by mouth daily or as directed         Social History     Tobacco Use     Smoking status: Never Smoker     Smokeless tobacco: Never Used   Substance Use Topics     Alcohol use: Yes     Comment: occ wine     Drug use: No       Tammi Rodriguez LPN  9/30/2020  8:55 AM

## 2020-09-30 NOTE — LETTER
"9/30/2020       RE: Meliza Krishnamurthy  [address] [place] [address]     Dear Colleague,    Thank you for referring your patient, Meliza Krishnamurthy, to the [facility] UROLOGY AND [facility] FOR PROSTATE AND UROLOGIC CANCERS at [facility]. Please see a copy of my visit note below.    Video Visit Technology for this patient: Envivio Video Visit- Patient was left in waiting room    Meliza Krishnamurthy is a 84 year old female who is being evaluated via a billable video visit.      The patient has been notified of following:     \"This video visit will be conducted via a call between you and your physician/provider. We have found that certain health care needs can be provided without the need for an in-person physical exam.  This service lets us provide the care you need with a video conversation.  If a prescription is necessary we can send it directly to your pharmacy.  If lab work is needed we can place an order for that and you can then stop by our lab to have the test done at a later time.    Video visits are billed at different rates depending on your insurance coverage.  Please reach out to your insurance provider with any questions.    If during the course of the call the physician/provider feels a video visit is not appropriate, you will not be charged for this service.\"    Patient has given verbal consent for Video visit? Yes  How would you like to obtain your AVS? MyChart  If you are dropped from the video visit, the video invite should be resent to: Text to cell phone: [phone]  Will anyone else be joining your video visit? No        Video-Visit Details    Type of service:  Video Visit    Video Start Time: 9:12 AM  Video End Time: 9:35 AM    Originating Location (pt. Location): Home    Distant Location (provider location):  [facility] UROLOGY AND [facility] FOR PROSTATE AND UROLOGIC CANCERS     Platform used for Video Visit: kamila Junior MD          CC:  UTI    HPI:  " Meliza Krishnamurthy is a 84 year old female asked to be seen in consultation by Dr. Carrillo for the above.  This problem has been going on in mid 2020 when she had a uti and had some urinary retention.  She reports to be voiding well without any any difficulty starting her stream and denies any slow stream.  She feels empty and doesn't have a feeling of incomplete emptying and does not have any issues without any hx of recurrent uti's.  She reports about 4 infections per year. The patient voids qfew hours, nocturia X 1-2.  She drinks mainly water and coffee.  She denies any dysuria,  hematuria, hesitancy, intermittency, feeling of incomplete emptying, or any recent hx of stones.    The patient has rare constipation.  She is not sexually active and denies any dyspareunia or pelvic pain.   She denies any vaginal bulge.  She has no neurological or balance problems.     Obstetric Hx:  She is .  The weight of her largest baby was 9 lbs 12oz.  Babies were delivered vaginally.  Menopause late 50's, HRT:  none    Past Medical History:   Diagnosis Date     -donor kidney transplant     one rejection      Polycystic kidney disease      S/P cerebral aneurysm repair      S/P hip replacement     multiple revisions     Skin cancer of face 2013       History reviewed. No pertinent surgical history.    Meds, Allergies, FHx and SHx reviewed per nurse's intake note.    ROS is negative on a 14 point scale.  All other positive and pertinent information is mentioned in the HPI.    PEx:   There were no vitals taken for this visit.  Data Unavailable, There is no height or weight on file to calculate BMI., 0 lbs 0 oz  Gen appearance:  Well groomed  HEENT:  EOMI, AT NC  Psych:  Normal Affect  Neuro:  A/O X 3  Skin:  Well perfused.  Resp:  No increased respiratory effort  Musk:  Full ROM in extremities  :  deferred    UA: UA RESULTS:  Recent Labs   Lab Test 20  1142   COLOR Yellow   APPEARANCE Slightly  Cloudy   URINEGLC Negative   URINEBILI Negative   URINEKETONE Negative   SG 1.015   UBLD Small*   URINEPH 6.5   PROTEIN 30*   NITRITE Negative   LEUKEST Large*   RBCU 22*   WBCU >182*         ASSESSMENT and PLAN:  This is a 84 year old female with some recurrent infections (4 per year) in the setting of renal transplant and postmenopausal status.  Different management options were discussed with the patient including observation, estrogen cream, prophylaxis and further w/u.  Pt. Would like to try the estrogen cream.    -New Rx for estrogen cream  -schedule cystoscopy next available with RU measurement    Thank you for allowing me to participate in Ms. Krishnamurthy's care.  I will keep you updated on her progress.    Fercho Junior MD

## 2020-09-30 NOTE — PROGRESS NOTES
CC:  UTI    HPI:  Meliza Krishnamurthy is a 84 year old female asked to be seen in consultation by Dr. Carrillo for the above.  This problem has been going on in mid 2020 when she had a uti and had some urinary retention.  She reports to be voiding well without any any difficulty starting her stream and denies any slow stream.  She feels empty and doesn't have a feeling of incomplete emptying and does not have any issues without any hx of recurrent uti's.  She reports about 4 infections per year. The patient voids qfew hours, nocturia X 1-2.  She drinks mainly water and coffee.  She denies any dysuria,  hematuria, hesitancy, intermittency, feeling of incomplete emptying, or any recent hx of stones.    The patient has rare constipation.  She is not sexually active and denies any dyspareunia or pelvic pain.   She denies any vaginal bulge.  She has no neurological or balance problems.     Obstetric Hx:  She is .  The weight of her largest baby was 9 lbs 12oz.  Babies were delivered vaginally.  Menopause late 50's, HRT:  none    Past Medical History:   Diagnosis Date     -donor kidney transplant     one rejection      Polycystic kidney disease      S/P cerebral aneurysm repair      S/P hip replacement     multiple revisions     Skin cancer of face        History reviewed. No pertinent surgical history.    Meds, Allergies, FHx and SHx reviewed per nurse's intake note.    ROS is negative on a 14 point scale.  All other positive and pertinent information is mentioned in the HPI.    PEx:   There were no vitals taken for this visit.  Data Unavailable, There is no height or weight on file to calculate BMI., 0 lbs 0 oz  Gen appearance:  Well groomed  HEENT:  EOMI, AT NC  Psych:  Normal Affect  Neuro:  A/O X 3  Skin:  Well perfused.  Resp:  No increased respiratory effort  Musk:  Full ROM in extremities  :  deferred    UA: UA RESULTS:  Recent Labs   Lab Test 20  1142   COLOR Yellow    APPEARANCE Slightly Cloudy   URINEGLC Negative   URINEBILI Negative   URINEKETONE Negative   SG 1.015   UBLD Small*   URINEPH 6.5   PROTEIN 30*   NITRITE Negative   LEUKEST Large*   RBCU 22*   WBCU >182*         ASSESSMENT and PLAN:  This is a 84 year old female with some recurrent infections (4 per year) in the setting of renal transplant and postmenopausal status.  Different management options were discussed with the patient including observation, estrogen cream, prophylaxis and further w/u.  Pt. Would like to try the estrogen cream.    -New Rx for estrogen cream  -schedule cystoscopy next available with RU measurement    Thank you for allowing me to participate in Ms. Krishnamurthy's care.  I will keep you updated on her progress.    Fercho Junior MD

## 2020-10-01 ENCOUNTER — TELEPHONE (OUTPATIENT)
Dept: UROLOGY | Facility: CLINIC | Age: 85
End: 2020-10-01

## 2021-01-01 ENCOUNTER — VIRTUAL VISIT (OUTPATIENT)
Dept: NEPHROLOGY | Facility: CLINIC | Age: 86
End: 2021-01-01
Attending: INTERNAL MEDICINE
Payer: MEDICARE

## 2021-01-01 ENCOUNTER — TELEPHONE (OUTPATIENT)
Dept: TRANSPLANT | Facility: CLINIC | Age: 86
End: 2021-01-01

## 2021-01-01 ENCOUNTER — HEALTH MAINTENANCE LETTER (OUTPATIENT)
Age: 86
End: 2021-01-01

## 2021-01-01 ENCOUNTER — TRANSFERRED RECORDS (OUTPATIENT)
Dept: HEALTH INFORMATION MANAGEMENT | Facility: CLINIC | Age: 86
End: 2021-01-01
Payer: COMMERCIAL

## 2021-01-01 ENCOUNTER — TELEPHONE (OUTPATIENT)
Dept: NEPHROLOGY | Facility: CLINIC | Age: 86
End: 2021-01-01

## 2021-01-01 DIAGNOSIS — D84.9 IMMUNOSUPPRESSION (H): ICD-10-CM

## 2021-01-01 DIAGNOSIS — Z94.0 DECEASED-DONOR KIDNEY TRANSPLANT RECIPIENT: ICD-10-CM

## 2021-01-01 DIAGNOSIS — Z94.0 KIDNEY TRANSPLANTED: ICD-10-CM

## 2021-01-01 DIAGNOSIS — Z48.298 AFTERCARE FOLLOWING ORGAN TRANSPLANT: ICD-10-CM

## 2021-01-01 DIAGNOSIS — Z94.0 KIDNEY REPLACED BY TRANSPLANT: ICD-10-CM

## 2021-01-01 DIAGNOSIS — Z94.0 HTN, KIDNEY TRANSPLANT RELATED: ICD-10-CM

## 2021-01-01 DIAGNOSIS — Z94.0 KIDNEY REPLACED BY TRANSPLANT: Primary | ICD-10-CM

## 2021-01-01 DIAGNOSIS — Z79.899 ENCOUNTER FOR LONG-TERM CURRENT USE OF MEDICATION: ICD-10-CM

## 2021-01-01 DIAGNOSIS — I15.1 HTN, KIDNEY TRANSPLANT RELATED: ICD-10-CM

## 2021-01-01 DIAGNOSIS — E87.79 OTHER HYPERVOLEMIA: ICD-10-CM

## 2021-01-01 DIAGNOSIS — Z94.0 KIDNEY TRANSPLANTED: Primary | ICD-10-CM

## 2021-01-01 LAB
CYCLOSPORINE BLD LC/MS/MS-MCNC: 366 UG/L (ref 50–400)
CYCLOSPORINE BLD LC/MS/MS-MCNC: 39 UG/L (ref 50–400)
TME LAST DOSE: ABNORMAL H
TME LAST DOSE: NORMAL H

## 2021-01-01 PROCEDURE — 99214 OFFICE O/P EST MOD 30 MIN: CPT | Mod: 95 | Performed by: INTERNAL MEDICINE

## 2021-01-01 PROCEDURE — 80158 DRUG ASSAY CYCLOSPORINE: CPT | Performed by: INTERNAL MEDICINE

## 2021-01-01 RX ORDER — SULFAMETHOXAZOLE AND TRIMETHOPRIM 400; 80 MG/1; MG/1
1 TABLET ORAL DAILY
Qty: 30 TABLET | Refills: 11 | Status: SHIPPED | OUTPATIENT
Start: 2021-01-01

## 2021-01-01 RX ORDER — MYCOPHENOLATE MOFETIL 250 MG/1
CAPSULE ORAL
Qty: 84 CAPSULE | Refills: 11 | Status: SHIPPED | OUTPATIENT
Start: 2021-01-01

## 2021-01-01 RX ORDER — BUMETANIDE 1 MG/1
TABLET ORAL
COMMUNITY
Start: 2021-01-01

## 2021-01-01 RX ORDER — CLOPIDOGREL BISULFATE 75 MG/1
75 TABLET ORAL DAILY
Start: 2021-01-01

## 2021-01-01 RX ORDER — CYCLOSPORINE 25 MG/1
CAPSULE, LIQUID FILLED ORAL
Qty: 60 CAPSULE | Refills: 11 | Status: SHIPPED | OUTPATIENT
Start: 2021-01-01

## 2021-01-03 NOTE — LETTER
PHYSICIAN ORDERS      DATE & TIME ISSUED: 2021 7:36 AM  PATIENT NAME: Meliza Krishnamurthy   : 1935     [facility] MR# [if applicable]: [id number]     DIAGNOSIS:  Kidney Transplant  ICD-10 CODE: Z94.0     Please repeat the following labs during the month of 2021:  Cyclosporine drug level  CBC  BMP    Any questions please call: [phone]  Please fax lab results to [phone].    .

## 2021-01-04 NOTE — TELEPHONE ENCOUNTER
Issue:  CSA charted as over 15 hour trough.    Plan/LPN task:  Please call Meliza Krishnamurthy to verify trough timing. If not an accurate trough, please have Meliza Zaidi repeat all labs in January with 12 hour trough.

## 2021-01-14 NOTE — TELEPHONE ENCOUNTER
Creatine 0.76, per Dr. Burton ok for pt to proceed with trip to [place] as scheduled. Pt made aware and expressed happiness and v/u of plan of care. No further questions/concerns at this time.    Daily Note     Today's date: 2021  Patient name: Lay Damon  : 1967  MRN: [id number]  Referring provider: Giovanni Thao MD  Dx:   Encounter Diagnosis     ICD-10-CM    1  Closed fracture of distal ends of left radius and ulna with routine healing, subsequent encounter  S52 502D     S52 602D                   Subjective: Patient reports she was sore after last session, no increase in pain at surgery site  Reports that she noticed swelling at her 1st and 2nd digits  Objective: See treatment diary below      Assessment: Progressing well with PROM  Improved ROM in to supination today, added supination stretching with elbow flexed vs extended  Will add radial head mobs- proximal at elbow joint to restore radius rotation  Mild swelling at 1st and 2nd digit, likely d/t muscle tightness with flexion, tends to ulnar deviate with wrist flexion  Plan: Continue per plan of care  Precautions: L wrist fx with ORIF of the distal radius  AROM only L wrist x 2 weeks, then PROM  A/PROM L fingers to tolerance      Manuals  1   PROM when ready x40 x60' (total time) x60' (total time) x60' (total time) x50   Graston- flexors, extension, anterior wrist, and palm of hand x10 included thumb today x10'  Graston x 25 - flexors, extensors, anterior wrist and palm of the hand x15' x10'           Thumb stretching Taught finkelstein's stretch -      Wrist joint mobs Throughout range Throughout range, Grade III to improve supination range Throughout range, Grade I, and II to distal radius  Grade II to distal ulna Throughout range, Grade II and Grade I Throughout PROM today, Grade I, II, III to improve range              Radial and ulnar glides   -                            Neuro Re-Ed                Ther Ex        Wrist AROM 10x AA with supination only today - focusing on PROM - focusing on PROM only Focusing on PROM -   WB wrist extension 2x10 :10 - pressing in to pillow  WB extension ROM stretch- standing 20x:10-20 sec 2x10 :10 stretch, hand pressing in to pillow 2x10      Extension ROM stretch- with ball, WB 10x:10-20 sec  With ball initially 10x NT                                                   Ther Activity                        Gait Training                        Modalities         MH x 10', seated, unbilled Hydrocolator not working [address] x 5' at end    MH x 10 unbilled

## 2021-01-25 NOTE — TELEPHONE ENCOUNTER
Spoke to inpatient pharmacist    Who found notes on her immunosuppression medications   Confirmed taking  Cellcept mycophenolate mofetil 500 and 250   Low dose cyclosporine 25 mg twice per day   On Amiodarone   Prednisone       Follows with Cards at [facility]  Able to read notes in care everywhere       Admitted for GI bleed

## 2021-01-25 NOTE — TELEPHONE ENCOUNTER
Inpatient pharmacist at [facility] called regarding the regiment of the Tacrolimus.

## 2021-03-02 PROBLEM — C44.622 SCC (SQUAMOUS CELL CARCINOMA), ARM, RIGHT: Status: ACTIVE | Noted: 2020-07-02

## 2021-03-11 NOTE — TELEPHONE ENCOUNTER
Patient Call: Transplant Lab/Orders  Route to LPN  Post Transplant Days: [id number]  When patient is less than 60 days post-transplant, route high priority    Reason for Call: Annual lab reorder  Callback needed? No   Carlos Espinoza  And copy to pt

## 2021-03-11 NOTE — LETTER
[facility]  Room 2[address]200  [address]  [address]  Tel [phone]  Toll Free [phone]                OUTPATIENT LABORATORY TEST ORDER    Patient Name: Meliza Krishnamurthy  Transplant Date: 1/6/1998 (Kidney)  YOB: 1935  Issue Date & Time:March 11, 202112:01 PM    [facility] MR:  [id number]  Exp. Date (1 year after date issued)      Diagnoses: Kidney Transplant (ICD-10 Z94.0)   Long term use of medications (ICD-Z79.899)     Lab results to be available on the same day drawn.   Patient should release information to the [facility] [place] Transplant [facility].  Please fax to the Transplant Center at [phone].      Every 3 Months   ?Hemogram and Platelet   ?Basic Metabolic Panel            ?CSA/Cyclosporine                   Every 6 Months                                           ?Urine for protein/creatinine      If you have any questions, please call [facility] at [phone] or [phone].    Please fax labs to [phone]

## 2021-03-24 NOTE — TELEPHONE ENCOUNTER
Son called stated the patient has had about 4 or so UTI's since December and wondered if the patient should go back on Bactrim?

## 2021-03-25 NOTE — TELEPHONE ENCOUNTER
Call returned to son Ramez. Let him know she should be bactrim for PCP prophylaxis, not necessarily for UTI's. Ramez v/u, refill sent to local pharmacy. Bactrim gave alert for interaction with warfarin. Ramez reports that Meliza is not on warfarin but is now on Plavix and ASA after stent placements. Med list updated. Meliza has current labs in Care Everywhere and CSA at goal range. Follow up appt with Dr. Henderson is next month. RNCC also advised she continue to follow with urology, Ramez report she will see urology next week to manage UTI's.

## 2021-04-20 NOTE — PROGRESS NOTES
CHRONIC TRANSPLANT NEPHROLOGY VISIT    Assessment & Plan   # DDKT: Trend up, 0.99 today likely 2/2 aggressive diuresis.    - Baseline Creatinine:  ~ 0.8-0.9   - Proteinuria: Minimal (0.2-0.5 grams), UPCR 0.23g/g 8/2020   - Date DSA Last Checked: Aug/2018      Latest DSA: Significant DSA (>3000 mfi) to DQB7, MFI 7800. Not rechecking unless increase in creatinine    - BK Viremia: Not checked recently due to time from transplant   - Kidney Tx Biopsy: No      # Immunosuppression: Cyclosporine (goal 50-75), Mycophenolate mofetil (dose 500mg in AM and 250mg in PM) and Prednisone (dose 5 mg daily)             - Continue with intensive monitoring of immunosuppression for efficacy and    toxicity.             - Changes: Not at this time    # Infection Prophylaxis:   - PJP: Sulfa/TMP (Bactrim)    # Hypertension: Controlled;  Goal BP: < 130/80   - Changes: Not at this time. Continue bumex 2mg in AM and 1mg in PM, metoprolol 50mg in AM and 25mg in PM    #HFrEF:   -Admitted to [place] 3/2021 for volume overload, found to have EF 32%.    -Now on bumex 2mg in AM and 1mg in PM    # CAD:   -s/p LHC 12/2020 with GT to RCA x2. Remains on ASA and plavix. Followed by cardiology at [facility]    # GI Bleed:   -Admitted in [place] in 2/2021, colonoscopy with moderate to large cecal and ascending colonic angiodysplastic lesions s/p APC. Coumadin held    # Afib:   -Coumadin on hold 2/2 GI bleeds, may restart in 12/2021 after plavix stops. S/p DCCV 6/2020. Remains on amiodarone    # Anemia in Chronic Renal Disease: Hgb: Trend up      ANTWON: No   - Iron studies: Not checked recently    # Mineral Bone Disorder:   - Secondary renal hyperparathyroidism; PTH level: Not checked recently        On treatment: None  - Vitamin D; level: Not checked recently        On supplement: Yes  - Calcium; level: Normal        On supplement: No  - Phosphorus; level: Not checked recently        On supplement: Yes    # Recurrent UTI:   -Most recent UTI 3/23/2021, E.coli, put  on macrobid x5d    # Hypokalemia:   -continue potassium 20meq BID    # Skin Cancer Risk:    - Discussed sun protection and recommend regular follow up with Dermatology.    # Medical Compliance: Yes    # COVID-19 Virus Review: Discussed COVID-19 virus and the potential medical risks.  Reviewed preventative health recommendations, which includes washing hands for 20 seconds, avoid touching your face, and social distancing.  Asked patient to inform the transplant center if they are exposed or diagnosed with this virus.    # COVID Vaccine Completed: Yes        # Transplant History:  Etiology of Kidney Failure: Polycystic kidney disease (PKD)  Tx: DDKT  Transplant: 1/6/1998 (Kidney)  Significant changes in immunosuppression: None  Significant transplant-related complications: None    Transplant Office Phone Number: [phone]    Assessment and plan was discussed with the patient and she voiced her understanding and agreement.    Return visit: No follow-ups on file.    Brett Henderson MD    Chief Complaint   Ms. Krishnamurthy is a 85 year old here for routine follow up, kidney transplant and immunosuppression management.    History of Present Illness   The patient has had a few recent admissions. She was admitted in 2/2021 in [place] twice for GI bleeding. At that point she was on plavix, ASA, and coumadin. She had colonoscopies with APC due to bleeding. Her coumadin was stopped and she has not had further bleeds. She was admitted to [place] in 3/2021 for fluid overload and was diuresed. She was found to have reduced heart function with EF 32%. She recently was switched from lasix to bumex.     Currently, she denies N/V/D, fever, chills, SOB, chest pain.     Home BP: 125/80    Problem List   Patient Active Problem List   Diagnosis     Carotid artery stenosis, asymptomatic     Status post kidney transplant     Polycystic kidney disease     S/P hip replacement     Renal hypertension     Immunosuppression (H)     Hypovitaminosis D      Need for pneumocystis prophylaxis     UTI (urinary tract infection)     SCC (squamous cell carcinoma), arm, right       Allergies   No Known Allergies    Medications   Current Outpatient Medications   Medication Sig     amiodarone (PACERONE) 200 MG tablet Take 200 mg by mouth daily     aspirin (ASA) 81 MG EC tablet Take 1 tablet (81 mg) by mouth daily     cholecalciferol (VITAMIN D-1000 MAX ST) 1000 units TABS Take 1,000 Units by mouth At Bedtime     clopidogrel (PLAVIX) 75 MG tablet Take 1 tablet (75 mg) by mouth daily     COMPOUNDED NON-CONTROLLED SUBSTANCE (CMPD RX) - PHARMACY TO MIX COMPOUNDED MEDICATION Estriol 1 mg/g in HRT base, apply small amount to finger and apply to inside vagina daily for 2 weeks then twice weekly     cycloSPORINE modified (GENERIC EQUIVALENT) 25 MG capsule Take 1 capsule (25 mg) by mouth 2 times daily     ezetimibe (ZETIA) 10 MG tablet Take 5 mg by mouth daily      famotidine (PEPCID) 20 MG tablet Take 20 mg by mouth 2 times daily      Ferrous Sulfate 324 (65 Fe) MG TBEC Take 324 mg by mouth 2 times daily      furosemide (LASIX) 20 MG tablet Take 40 mg by mouth in the morning and 20 mg at noon     metoprolol tartrate (LOPRESSOR) 25 MG tablet Take 12.5 mg by mouth 2 times daily     Multiple Vitamins-Minerals (MULTIVITAL PO) Take 1 tablet by mouth daily      mycophenolate (GENERIC EQUIVALENT) 250 MG capsule Take 2 capsules (500 mg) by mouth 2 times daily Total dose = 500 mg in the AM and 250 mg in the PM     polyethylene glycol (MIRALAX) 17 g packet Take 17 g by mouth daily     potassium chloride ER (K-DUR/KLOR-CON M) 20 MEQ CR tablet Take 20 mEq by mouth 2 times daily      predniSONE (DELTASONE) 5 MG tablet Take 1 tablet by mouth daily.     senna-docusate (SENOKOT-S/PERICOLACE) 8.6-50 MG tablet Take 1 tablet by mouth 2 times daily     sulfamethoxazole-trimethoprim (BACTRIM) 400-80 MG tablet Take 1 tablet by mouth daily     vancomycin 1,250 mg Inject 1,250 mg into the vein every 24 hours      No current facility-administered medications for this visit.      There are no discontinued medications.    Physical Exam   Vital Signs: There were no vitals taken for this visit.    GENERAL APPEARANCE: alert and no distress  HENT: no obvious abnormalities on appearance  RESP: breathing appears unremarkable with normal rate, no audible wheezing or cough and no apparent shortness of breath with conversation  MS: extremities normal - no gross deformities noted  SKIN: no apparent rash and normal skin tone  NEURO: speech is clear with no obvious neurological deficits  PSYCH: mentation appears normal and affect normal      Data     Renal Latest Ref Rng & Units 12/29/2020 9/11/2020 9/10/2020   Na 133 - 144 mmol/L - 134 134   Na (external) 135 - 145 mmol/L 139 - -   K 3.4 - 5.3 mmol/L - 3.4 3.9   K (external) 3.6 - 5.2 mmol/L 3.9 - -   Cl 94 - 109 mmol/L - 104 104   Cl (external) 98 - 107 mmol/L 107 - -   CO2 20 - 32 mmol/L - 23 23   CO2 (external) 22 - 29 mmol/L 23 - -   BUN 7 - 30 mg/dL - 14 14   BUN (external) 6 - 21 mg/dL 18 - -   Cr 0.52 - 1.04 mg/dL - 0.76 0.78   Cr (external) 0.59 - 1.04 mg/dL 0.86 - -   Glucose 70 - 99 mg/dL - 76 75   Glucose (external) 70 - 140 mg/dL 81 - -   Ca  8.5 - 10.1 mg/dL - 8.4(L) 8.4(L)   Ca (external) 8.8 - 10.2 mg/dL 8.8 - -   Mg 1.6 - 2.3 mg/dL - 1.9 -     Bone Health Latest Ref Rng & Units 12/31/2019 9/20/2011 8/12/2008   Phos 2.5 - 4.5 mg/dL - 4.1 3.5   PTHi 12 - 72 pg/mL - 103(H) 58   Vit D Def 20 - 75 ug/L 51 - -     Heme Latest Ref Rng & Units 9/11/2020 9/10/2020 9/9/2020   WBC 4.0 - 11.0 10e9/L 4.0 5.4 6.2   WBC (external) 3.4 - 9.6 10(9)L - - -   Hgb 11.7 - 15.7 g/dL 10.5(L) 10.3(L) 9.8(L)   Hgb (external) 11.6 - 15.0 g/dL - - -   Plt 150 - 450 10e9/L 234 183 162   Plt (external) 157 - 371 10(9)L - - -   ABSOLUTE NEUTROPHIL 1.6 - 8.3 10e9/L - - -   ABSOLUTE NEUTROPHILS (EXTERNAL) 1.8 - 7.8 x10*3/uL - - -   ABSOLUTE LYMPHOCYTES 0.8 - 5.3 10e9/L - - -   ABSOLUTE LYMPHOCYTES  (EXTERNAL) 1.0 - 4.8 x10*3/uL - - -   ABSOLUTE MONOCYTES 0.0 - 1.3 10e9/L - - -   ABSOLUTE MONOCYTES (EXTERNAL) 0.0 - 0.8 x10*3/uL - - -   ABSOLUTE EOSINOPHILS 0.0 - 0.7 10e9/L - - -   ABSOLUTE EOSINOPHILS (EXTERNAL) 0.0 - 0.5 x10*3/uL - - -   ABSOLUTE BASOPHILS (EXTERNAL) 0.0 - 0.2 x10*3/uL - - -     Liver Latest Ref Rng & Units 9/10/2020 9/7/2020 8/19/2019   AP 40 - 150 U/L 83 - -   TBili 0.2 - 1.3 mg/dL 0.7 - -   ALT 0 - 50 U/L 52(H) 32 16   AST 0 - 45 U/L 40 29 31   Tot Protein 6.8 - 8.8 g/dL 5.0(L) - -   Albumin 3.4 - 5.0 g/dL 2.3(L) - -        Iron studies Latest Ref Rng & Units 9/9/2020 10/30/2018   Iron 35 - 180 ug/dL 16(L) 41   Iron sat 15 - 46 % 9(L) 10(L)

## 2021-04-20 NOTE — PROGRESS NOTES
Meliza is a 85 year old who is being evaluated via a billable video visit.      How would you like to obtain your AVS? MyChart  If the video visit is dropped, the invitation should be resent by: Text to cell phone: [phone]  Will anyone else be joining your video visit? No    Video Start Time: 4:26 PM  Video-Visit Details    Type of service:  Video Visit    Video End Time:4:44 PM    Originating Location (pt. Location): Home    Distant Location (provider location):  [facility] [place]     Platform used for Video Visit: Alana Henderson MD, KEVAN  Transplant Nephrology  Pager: [phone]

## 2021-04-20 NOTE — LETTER
4/20/2021       RE: Meliza Krishnamurthy  [address] [place] [address]     Dear Colleague,    Thank you for referring your patient, Meliza Krishnamurthy, to the [facility] [place] at [facility]. Please see a copy of my visit note below.    CHRONIC TRANSPLANT NEPHROLOGY VISIT    Assessment & Plan   # DDKT: Trend up, 0.99 today likely 2/2 aggressive diuresis.    - Baseline Creatinine:  ~ 0.8-0.9   - Proteinuria: Minimal (0.2-0.5 grams), UPCR 0.23g/g 8/2020   - Date DSA Last Checked: Aug/2018      Latest DSA: Significant DSA (>3000 mfi) to DQB7, MFI 7800. Not rechecking unless increase in creatinine    - BK Viremia: Not checked recently due to time from transplant   - Kidney Tx Biopsy: No      # Immunosuppression: Cyclosporine (goal 50-75), Mycophenolate mofetil (dose 500mg in AM and 250mg in PM) and Prednisone (dose 5 mg daily)             - Continue with intensive monitoring of immunosuppression for efficacy and    toxicity.             - Changes: Not at this time    # Infection Prophylaxis:   - PJP: Sulfa/TMP (Bactrim)    # Hypertension: Controlled;  Goal BP: < 130/80   - Changes: Not at this time. Continue bumex 2mg in AM and 1mg in PM, metoprolol 50mg in AM and 25mg in PM    #HFrEF:   -Admitted to [place] 3/2021 for volume overload, found to have EF 32%.    -Now on bumex 2mg in AM and 1mg in PM    # CAD:   -s/p LHC 12/2020 with GT to RCA x2. Remains on ASA and plavix. Followed by cardiology at [place]    # GI Bleed:   -Admitted in [place] in 2/2021, colonoscopy with moderate to large cecal and ascending colonic angiodysplastic lesions s/p APC. Coumadin held    # Afib:   -Coumadin on hold 2/2 GI bleeds, may restart in 12/2021 after plavix stops. S/p DCCV 6/2020. Remains on amiodarone    # Anemia in Chronic Renal Disease: Hgb: Trend up      ANTWON: No   - Iron studies: Not checked recently    # Mineral Bone Disorder:   - Secondary renal  hyperparathyroidism; PTH level: Not checked recently        On treatment: None  - Vitamin D; level: Not checked recently        On supplement: Yes  - Calcium; level: Normal        On supplement: No  - Phosphorus; level: Not checked recently        On supplement: Yes    # Recurrent UTI:   -Most recent UTI 3/23/2021, E.coli, put on macrobid x5d    # Hypokalemia:   -continue potassium 20meq BID    # Skin Cancer Risk:    - Discussed sun protection and recommend regular follow up with Dermatology.    # Medical Compliance: Yes    # COVID-19 Virus Review: Discussed COVID-19 virus and the potential medical risks.  Reviewed preventative health recommendations, which includes washing hands for 20 seconds, avoid touching your face, and social distancing.  Asked patient to inform the transplant center if they are exposed or diagnosed with this virus.    # COVID Vaccine Completed: Yes        # Transplant History:  Etiology of Kidney Failure: Polycystic kidney disease (PKD)  Tx: DDKT  Transplant: 1/6/1998 (Kidney)  Significant changes in immunosuppression: None  Significant transplant-related complications: None    Transplant Office Phone Number: [phone]    Assessment and plan was discussed with the patient and she voiced her understanding and agreement.    Return visit: No follow-ups on file.    Brett Henderson MD    Chief Complaint   Ms. Krishnamurthy is a 85 year old here for routine follow up, kidney transplant and immunosuppression management.    History of Present Illness   The patient has had a few recent admissions. She was admitted in 2/2021 in [place] twice for GI bleeding. At that point she was on plavix, ASA, and coumadin. She had colonoscopies with APC due to bleeding. Her coumadin was stopped and she has not had further bleeds. She was admitted to [place] in 3/2021 for fluid overload and was diuresed. She was found to have reduced heart function with EF 32%. She recently was switched from lasix to bumex.     Currently, she  denies N/V/D, fever, chills, SOB, chest pain.     Home BP: 125/80    Problem List   Patient Active Problem List   Diagnosis     Carotid artery stenosis, asymptomatic     Status post kidney transplant     Polycystic kidney disease     S/P hip replacement     Renal hypertension     Immunosuppression (H)     Hypovitaminosis D     Need for pneumocystis prophylaxis     UTI (urinary tract infection)     SCC (squamous cell carcinoma), arm, right       Allergies   No Known Allergies    Medications   Current Outpatient Medications   Medication Sig     amiodarone (PACERONE) 200 MG tablet Take 200 mg by mouth daily     aspirin (ASA) 81 MG EC tablet Take 1 tablet (81 mg) by mouth daily     cholecalciferol (VITAMIN D-1000 MAX ST) 1000 units TABS Take 1,000 Units by mouth At Bedtime     clopidogrel (PLAVIX) 75 MG tablet Take 1 tablet (75 mg) by mouth daily     COMPOUNDED NON-CONTROLLED SUBSTANCE (CMPD RX) - PHARMACY TO MIX COMPOUNDED MEDICATION Estriol 1 mg/g in HRT base, apply small amount to finger and apply to inside vagina daily for 2 weeks then twice weekly     cycloSPORINE modified (GENERIC EQUIVALENT) 25 MG capsule Take 1 capsule (25 mg) by mouth 2 times daily     ezetimibe (ZETIA) 10 MG tablet Take 5 mg by mouth daily      famotidine (PEPCID) 20 MG tablet Take 20 mg by mouth 2 times daily      Ferrous Sulfate 324 (65 Fe) MG TBEC Take 324 mg by mouth 2 times daily      furosemide (LASIX) 20 MG tablet Take 40 mg by mouth in the morning and 20 mg at noon     metoprolol tartrate (LOPRESSOR) 25 MG tablet Take 12.5 mg by mouth 2 times daily     Multiple Vitamins-Minerals (MULTIVITAL PO) Take 1 tablet by mouth daily      mycophenolate (GENERIC EQUIVALENT) 250 MG capsule Take 2 capsules (500 mg) by mouth 2 times daily Total dose = 500 mg in the AM and 250 mg in the PM     polyethylene glycol (MIRALAX) 17 g packet Take 17 g by mouth daily     potassium chloride ER (K-DUR/KLOR-CON M) 20 MEQ CR tablet Take 20 mEq by mouth 2 times  daily      predniSONE (DELTASONE) 5 MG tablet Take 1 tablet by mouth daily.     senna-docusate (SENOKOT-S/PERICOLACE) 8.6-50 MG tablet Take 1 tablet by mouth 2 times daily     sulfamethoxazole-trimethoprim (BACTRIM) 400-80 MG tablet Take 1 tablet by mouth daily     vancomycin 1,250 mg Inject 1,250 mg into the vein every 24 hours     No current facility-administered medications for this visit.      There are no discontinued medications.    Physical Exam   Vital Signs: There were no vitals taken for this visit.    GENERAL APPEARANCE: alert and no distress  HENT: no obvious abnormalities on appearance  RESP: breathing appears unremarkable with normal rate, no audible wheezing or cough and no apparent shortness of breath with conversation  MS: extremities normal - no gross deformities noted  SKIN: no apparent rash and normal skin tone  NEURO: speech is clear with no obvious neurological deficits  PSYCH: mentation appears normal and affect normal      Data     Renal Latest Ref Rng & Units 12/29/2020 9/11/2020 9/10/2020   Na 133 - 144 mmol/L - 134 134   Na (external) 135 - 145 mmol/L 139 - -   K 3.4 - 5.3 mmol/L - 3.4 3.9   K (external) 3.6 - 5.2 mmol/L 3.9 - -   Cl 94 - 109 mmol/L - 104 104   Cl (external) 98 - 107 mmol/L 107 - -   CO2 20 - 32 mmol/L - 23 23   CO2 (external) 22 - 29 mmol/L 23 - -   BUN 7 - 30 mg/dL - 14 14   BUN (external) 6 - 21 mg/dL 18 - -   Cr 0.52 - 1.04 mg/dL - 0.76 0.78   Cr (external) 0.59 - 1.04 mg/dL 0.86 - -   Glucose 70 - 99 mg/dL - 76 75   Glucose (external) 70 - 140 mg/dL 81 - -   Ca  8.5 - 10.1 mg/dL - 8.4(L) 8.4(L)   Ca (external) 8.8 - 10.2 mg/dL 8.8 - -   Mg 1.6 - 2.3 mg/dL - 1.9 -     Bone Health Latest Ref Rng & Units 12/31/2019 9/20/2011 8/12/2008   Phos 2.5 - 4.5 mg/dL - 4.1 3.5   PTHi 12 - 72 pg/mL - 103(H) 58   Vit D Def 20 - 75 ug/L 51 - -     Heme Latest Ref Rng & Units 9/11/2020 9/10/2020 9/9/2020   WBC 4.0 - 11.0 10e9/L 4.0 5.4 6.2   WBC (external) 3.4 - 9.6 10(9)L - - -   Hgb  11.7 - 15.7 g/dL 10.5(L) 10.3(L) 9.8(L)   Hgb (external) 11.6 - 15.0 g/dL - - -   Plt 150 - 450 10e9/L 234 183 162   Plt (external) 157 - 371 10(9)L - - -   ABSOLUTE NEUTROPHIL 1.6 - 8.3 10e9/L - - -   ABSOLUTE NEUTROPHILS (EXTERNAL) 1.8 - 7.8 x10*3/uL - - -   ABSOLUTE LYMPHOCYTES 0.8 - 5.3 10e9/L - - -   ABSOLUTE LYMPHOCYTES (EXTERNAL) 1.0 - 4.8 x10*3/uL - - -   ABSOLUTE MONOCYTES 0.0 - 1.3 10e9/L - - -   ABSOLUTE MONOCYTES (EXTERNAL) 0.0 - 0.8 x10*3/uL - - -   ABSOLUTE EOSINOPHILS 0.0 - 0.7 10e9/L - - -   ABSOLUTE EOSINOPHILS (EXTERNAL) 0.0 - 0.5 x10*3/uL - - -   ABSOLUTE BASOPHILS (EXTERNAL) 0.0 - 0.2 x10*3/uL - - -     Liver Latest Ref Rng & Units 9/10/2020 9/7/2020 8/19/2019   AP 40 - 150 U/L 83 - -   TBili 0.2 - 1.3 mg/dL 0.7 - -   ALT 0 - 50 U/L 52(H) 32 16   AST 0 - 45 U/L 40 29 31   Tot Protein 6.8 - 8.8 g/dL 5.0(L) - -   Albumin 3.4 - 5.0 g/dL 2.3(L) - -        Iron studies Latest Ref Rng & Units 9/9/2020 10/30/2018   Iron 35 - 180 ug/dL 16(L) 41   Iron sat 15 - 46 % 9(L) 10(L)                     Meliza is a 85 year old who is being evaluated via a billable video visit.      How would you like to obtain your AVS? MyChart  If the video visit is dropped, the invitation should be resent by: Text to cell phone: [phone]  Will anyone else be joining your video visit? No    Video Start Time: 4:26 PM  Video-Visit Details    Type of service:  Video Visit    Video End Time:4:44 PM    Originating Location (pt. Location): Home    Distant Location (provider location):  [facility] [place]     Platform used for Video Visit: [employer]      Brett Henderson MD, KEVAN  Transplant Nephrology  Pager: [phone]        Again, thank you for allowing me to participate in the care of your patient.      Sincerely,    Brett Henderson MD

## 2021-04-21 NOTE — TELEPHONE ENCOUNTER
[facility] Call Center    Phone Message    May a detailed message be left on voicemail: yes     Reason for Call: Order(s): Other:   Reason for requested: Lab Orders  Date needed: ASAP  Provider name: Dr. Henderson      Action Taken: Message routed to:  [facility] ([facility]    Travel Screening: Not Applicable

## 2021-04-21 NOTE — TELEPHONE ENCOUNTER
Updated current standing lab orders and faxed to patients local lab and sent copy to patient via [employer].

## 2021-04-21 NOTE — LETTER
OUTPATIENT LABORATORY TEST ORDER    Patient Name: Meliza Krishnamurthy  Transplant Date: 1/6/1998 (Kidney)  YOB: 1935                                                           Issue Date & Time:4/21/2021  2:40 PM   [facility] MR:  [id number]  Exp. Date (1 year after date issued)      Diagnoses: Kidney Transplant (ICD-10 Z94.0)   Long term use of medications (ICD-Z79.899)     Lab results to be available on the same day drawn.   Patient should release information to the [facility], Transplant Center.  Please fax to the Transplant Center at [phone].      Every 3 Months   ?Hemogram and Platelet   ?Basic Metabolic Panel            ?CSA/Cyclosporine                   Every 6 Months                                           ?Urine for protein/creatinine      If you have any questions, please call The Transplant Center at [phone] or [phone].    Please fax labs to [phone]

## 2021-05-10 NOTE — TELEPHONE ENCOUNTER
ISSUE:   Cyclosporine level 39 on 5/4, goal 50 - 75, dose 25 mg BID.    PLAN:   Please call patient and confirm this was an accurate 12-hour trough. Verify Cyclosporine dose 25 mg BID.   Confirm no new medications or illness.   Confirm no missed doses.   If accurate trough and accurate dose, stay on the same dose Cyclosporine and repeat labs in 2 weeks.    Nettie Torres, RN, BSN, [facility]  Patient Care Supervisor, Post Abdominal Transplant      OUTCOME:   Spoke with patient, they confirm accurate trough level and current dose 25 mg BID. Patient confirmed no dose change at this time and agrees to repeat labs in 2 weeks. Orders sent to lab for repeat labs. Patient voiced understanding of plan.

## 2021-05-10 NOTE — LETTER
PHYSICIAN ORDERS      DATE & TIME ISSUED: May 10, 2021 2:26 PM  PATIENT NAME: Meliza Krishnamurthy   : 1935     [facility] MR# [if applicable]: [id number]     DIAGNOSIS:  Kidney Transplant  ICD-10 CODE: Z94.0     Please repeat the following labs in 2 weeks:  Cyclosporine drug level  CBC  BMP    Any questions please call: [phone]  Please fax lab results to [phone].      Brett Henderson MD

## 2021-05-10 NOTE — TELEPHONE ENCOUNTER
Left message for patients negrita Myrick regardingCyclosporine level 39 on 5/4, goal 50 - 75, dose 25 mg BID.     PLAN:   Please call patient and confirm this was an accurate 12-hour trough. Verify Cyclosporine dose 25 mg BID.   Confirm no new medications or illness.   Confirm no missed doses.   If accurate trough and accurate dose, stay on the same dose Cyclosporine and repeat labs in 2 weeks.

## 2021-06-11 NOTE — TELEPHONE ENCOUNTER
I called Meliza for day 22 follow up visit as part of the Remdesivir trial.  Overall she is doing well, and is not on home oxygen.  She is feeling well and has not had any adverse medical events since being discharged. She does not have limitations to her physical activities.     Phone visit occurred on 07SEP2020, the ordinal score was scored for the previous day on 06SEP2020.     Patient [facility]      Dedrick Barber

## 2021-06-11 NOTE — TELEPHONE ENCOUNTER
I called Meliza for day 15 follow up visit as part of the Remdesivir trial.  Overall she is doing well as of 30AuUG2020 she reports being alive, is not on home oxygen.  She is feeling well and has not had any adverse medical events since being discharged. She does not have limitations to her physical activities.     Phone visit occurred on 31AUG2020, the ordinal score was scored for the previous day on 30AUG2020.     Patient was not seen in person for blood draw and vital sign assessment at [facility] drive up location.     Dedrick Barber

## 2021-06-11 NOTE — TELEPHONE ENCOUNTER
I called Meliza for day 29 follow up visit as part of the Remdesivir trial.  Overall she is doing well, and is not on home oxygen.  She is feeling well and has not had any adverse medical events since being discharged. She does not have limitations to her physical activities.     Phone visit occurred on 14SEP2020, the ordinal score was scored for the previous day on 13SEP2020     [facility].     Dedrick Barber

## 2021-06-25 NOTE — TELEPHONE ENCOUNTER
ISSUE:   Cyclosporine level 366 on 6/23, goal 50-75, dose 25 mg BID.    PLAN:   Please call patient and confirm this was an accurate 12-hour trough. Verify Cyclosporine dose 25 mg BID. Confirm no new medications or illness. Confirm no missed doses. If accurate trough and accurate dose, stay on the same dose Cyclosporine and repeat labs in 1-2 weeks    Ghada Haywood RN

## 2021-06-25 NOTE — LETTER
PHYSICIAN ORDERS      DATE & TIME ISSUED: 2021 4:08 PM  PATIENT NAME: Meliza Krishnamurthy   : 1935     [facility] MR# [if applicable]: [id number]     DIAGNOSIS:  Kidney transplant   ICD-10 CODE: Z94.0     Please repeat the following level in 1-2 weeks  Cyclosporine level (ensure 12 hours between last dose and blood draw)    Any questions please call: [phone] option [phone]    Please fax results to [phone].

## 2021-06-25 NOTE — TELEPHONE ENCOUNTER
OUTCOME:   Spoke with patient, they confirm accurate trough level and current dose 25 mg BID. Patient will continue current dose and repeat labs in 1-2 weeks. Orders sent to preferred pharmacy for dose change and lab for repeat labs. Patient voiced understanding of plan.

## 2021-10-21 PROBLEM — J12.82 PNEUMONIA DUE TO 2019 NOVEL CORONAVIRUS: Status: ACTIVE | Noted: 2020-08-15

## 2021-10-21 PROBLEM — U07.1 PNEUMONIA DUE TO 2019 NOVEL CORONAVIRUS: Status: ACTIVE | Noted: 2020-08-15

## 2021-10-21 PROBLEM — N39.0 CHRONIC UTI (URINARY TRACT INFECTION): Status: ACTIVE | Noted: 2021-10-21

## 2021-10-21 PROBLEM — R65.21 SEPTIC SHOCK (H): Status: ACTIVE | Noted: 2020-08-16

## 2021-10-21 PROBLEM — A41.9 SEPTIC SHOCK (H): Status: ACTIVE | Noted: 2020-08-16

## 2021-10-25 ENCOUNTER — VIRTUAL VISIT (OUTPATIENT)
Dept: NEPHROLOGY | Facility: CLINIC | Age: 86
End: 2021-10-25
Attending: INTERNAL MEDICINE

## 2021-10-25 DIAGNOSIS — Z91.199 NO-SHOW FOR APPOINTMENT: Primary | ICD-10-CM

## 2021-10-25 NOTE — PROGRESS NOTES
Patient has been call three times today and left a message to call back, but still she has not call back yet.    Ej

## 2021-10-25 NOTE — LETTER
10/25/2021       RE: Meliza Krishnamurthy  [address] [place] [address]     Dear Colleague,    Thank you for referring your patient, Meliza Krishnamurthy, to the [facility]. Please see a copy of my visit note below.    Patient has been call three times today and left a message to call back, but still she has not call back yet.    Ej      This patient was a no show for this scheduled appointment.      Again, thank you for allowing me to participate in the care of your patient.      Sincerely,    Brett Henderson MD

## 2021-12-13 ENCOUNTER — POST MORTEM DOCUMENTATION (OUTPATIENT)
Dept: TRANSPLANT | Facility: CLINIC | Age: 86
End: 2021-12-13

## 2021-12-13 ENCOUNTER — TELEPHONE (OUTPATIENT)
Dept: TRANSPLANT | Facility: CLINIC | Age: 86
End: 2021-12-13

## 2021-12-13 NOTE — PROGRESS NOTES
Received notification on   of patient's death from COVID pneumonia on 10/6/21, contact information is son, Ramez.  Place of death was reported as [facility].  Graft status at the time of death was reported as Unknown, on dialysis in ICU per Ramez's report.  Additional information:   TIS verification is: Pending  The Transplant Office has been notified that patient is . The Post Mortem Encounter has been completed. Notifications have been sent to the Care team.   Instructions have been sent to send a sympathy card to the family.  TOMASA CASTILLO  Received notification of patient's death from Meagan Myrick RN.  Place of death was reported as [facility].  Graft status at the time of death was reported as Not functioning.  Additional information: Per DC Summary...patient underwent HD after aggressive treatment for hyperkalemia was unsuccessful. 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  TIS verification is: Complete

## 2021-12-13 NOTE — TELEPHONE ENCOUNTER
Ramez called back - he reported she  on 10/6/21 from COVID pneumonia. She was on dialysis before her death.

## 2021-12-13 NOTE — TELEPHONE ENCOUNTER
Call placed to follow up after recent hospitalization/TAZ. Unable to view notes/labs in [facility]. [facility] unable to reach Meliza Krishnamurthy, left message for son Ramez to update SOT when able.

## 2021-12-14 ENCOUNTER — DOCUMENTATION ONLY (OUTPATIENT)
Dept: TRANSPLANT | Facility: CLINIC | Age: 86
End: 2021-12-14

## 2022-12-31 NOTE — PROCEDURE: MOHS SURGERY
Additional Complaints multiple medication modalities Consent (Spinal Accessory)/Introductory Paragraph: The rationale for Mohs was explained to the patient and consent was obtained. The risks, benefits and alternatives to therapy were discussed in detail. Specifically, the risks of damage to the spinal accessory nerve, infection, scarring, bleeding, prolonged wound healing, incomplete removal, allergy to anesthesia, and recurrence were addressed. Prior to the procedure, the treatment site was clearly identified and confirmed by the patient. All components of Universal Protocol/PAUSE Rule completed.

## 2023-08-04 NOTE — PROCEDURE: MOHS SURGERY
Consent: The patient's verbal consent was obtained including but not limited to risks of crusting, scabbing, blistering, scarring, darker or lighter pigmentary change, recurrence, incomplete removal and infection. Show Aperture Variable?: Yes Detail Level: Detailed Number Of Freeze-Thaw Cycles: 2 freeze-thaw cycles Render Post-Care Instructions In Note?: no Post-Care Instructions: I reviewed with the patient in detail post-care instructions. Patient is to wear sunprotection, and avoid picking at any of the treated lesions. Pt may apply Vaseline to crusted or scabbing areas. Duration Of Freeze Thaw-Cycle (Seconds): 1 Xenograft Text: The defect edges were debeveled with a #15 scalpel blade.  Given the location of the defect, shape of the defect and the proximity to free margins a xenograft was deemed most appropriate.  The graft was then trimmed to fit the size of the defect.  The graft was then placed in the primary defect and oriented appropriately.
